# Patient Record
Sex: MALE | Race: WHITE | HISPANIC OR LATINO | Employment: FULL TIME | URBAN - METROPOLITAN AREA
[De-identification: names, ages, dates, MRNs, and addresses within clinical notes are randomized per-mention and may not be internally consistent; named-entity substitution may affect disease eponyms.]

---

## 2017-01-12 ENCOUNTER — ALLSCRIPTS OFFICE VISIT (OUTPATIENT)
Dept: OTHER | Facility: OTHER | Age: 72
End: 2017-01-12

## 2017-02-21 ENCOUNTER — ANESTHESIA EVENT (OUTPATIENT)
Dept: GASTROENTEROLOGY | Facility: AMBULARY SURGERY CENTER | Age: 72
End: 2017-02-21
Payer: COMMERCIAL

## 2017-02-21 RX ORDER — VITAMIN B COMPLEX
TABLET ORAL EVERY MORNING
COMMUNITY
End: 2018-02-16

## 2017-02-21 RX ORDER — PANTOPRAZOLE SODIUM 40 MG/1
40 TABLET, DELAYED RELEASE ORAL EVERY MORNING
COMMUNITY
End: 2018-03-30

## 2017-02-21 RX ORDER — LACTOBACILLUS ACIDOPHILUS 2B CELL
TABLET ORAL 2 TIMES DAILY
COMMUNITY
End: 2018-03-30

## 2017-02-21 RX ORDER — ASCORBIC ACID 1000 MG
TABLET ORAL 2 TIMES DAILY
COMMUNITY
End: 2018-02-16

## 2017-02-22 ENCOUNTER — HOSPITAL ENCOUNTER (OUTPATIENT)
Facility: AMBULARY SURGERY CENTER | Age: 72
Setting detail: OUTPATIENT SURGERY
Discharge: HOME | End: 2017-02-22
Attending: INTERNAL MEDICINE | Admitting: INTERNAL MEDICINE
Payer: COMMERCIAL

## 2017-02-22 ENCOUNTER — GENERIC CONVERSION - ENCOUNTER (OUTPATIENT)
Dept: OTHER | Facility: OTHER | Age: 72
End: 2017-02-22

## 2017-02-22 ENCOUNTER — ANESTHESIA (OUTPATIENT)
Dept: GASTROENTEROLOGY | Facility: AMBULARY SURGERY CENTER | Age: 72
End: 2017-02-22
Payer: COMMERCIAL

## 2017-02-22 VITALS
DIASTOLIC BLOOD PRESSURE: 60 MMHG | RESPIRATION RATE: 18 BRPM | HEART RATE: 76 BPM | WEIGHT: 265 LBS | BODY MASS INDEX: 37.94 KG/M2 | TEMPERATURE: 98.3 F | HEIGHT: 70 IN | SYSTOLIC BLOOD PRESSURE: 127 MMHG | OXYGEN SATURATION: 93 %

## 2017-02-22 DIAGNOSIS — Z12.11 ENCOUNTER FOR SCREENING FOR MALIGNANT NEOPLASM OF COLON: ICD-10-CM

## 2017-02-22 DIAGNOSIS — R10.13 EPIGASTRIC PAIN: ICD-10-CM

## 2017-02-22 PROCEDURE — 88305 TISSUE EXAM BY PATHOLOGIST: CPT | Performed by: INTERNAL MEDICINE

## 2017-02-22 PROCEDURE — 88342 IMHCHEM/IMCYTCHM 1ST ANTB: CPT | Performed by: INTERNAL MEDICINE

## 2017-02-22 RX ORDER — SODIUM CHLORIDE, SODIUM LACTATE, POTASSIUM CHLORIDE, CALCIUM CHLORIDE 600; 310; 30; 20 MG/100ML; MG/100ML; MG/100ML; MG/100ML
50 INJECTION, SOLUTION INTRAVENOUS CONTINUOUS
Status: DISCONTINUED | OUTPATIENT
Start: 2017-02-22 | End: 2017-02-22 | Stop reason: HOSPADM

## 2017-02-22 RX ORDER — PROPOFOL 10 MG/ML
INJECTION, EMULSION INTRAVENOUS AS NEEDED
Status: DISCONTINUED | OUTPATIENT
Start: 2017-02-22 | End: 2017-02-22 | Stop reason: SURG

## 2017-02-22 RX ADMIN — LIDOCAINE HYDROCHLORIDE 50 MG: 20 INJECTION, SOLUTION INTRAVENOUS at 09:55

## 2017-02-22 RX ADMIN — PROPOFOL 150 MG: 10 INJECTION, EMULSION INTRAVENOUS at 09:55

## 2017-02-22 RX ADMIN — PROPOFOL 50 MG: 10 INJECTION, EMULSION INTRAVENOUS at 10:05

## 2017-02-22 RX ADMIN — PROPOFOL 100 MG: 10 INJECTION, EMULSION INTRAVENOUS at 10:00

## 2017-02-22 RX ADMIN — SODIUM CHLORIDE, SODIUM LACTATE, POTASSIUM CHLORIDE, AND CALCIUM CHLORIDE 50 ML/HR: .6; .31; .03; .02 INJECTION, SOLUTION INTRAVENOUS at 09:43

## 2017-03-02 ENCOUNTER — GENERIC CONVERSION - ENCOUNTER (OUTPATIENT)
Dept: OTHER | Facility: OTHER | Age: 72
End: 2017-03-02

## 2017-04-12 ENCOUNTER — ALLSCRIPTS OFFICE VISIT (OUTPATIENT)
Dept: OTHER | Facility: OTHER | Age: 72
End: 2017-04-12

## 2017-04-12 DIAGNOSIS — M25.561 PAIN IN RIGHT KNEE: ICD-10-CM

## 2017-04-12 DIAGNOSIS — M23.42 LOOSE BODY OF LEFT KNEE: ICD-10-CM

## 2017-04-17 ENCOUNTER — TRANSCRIBE ORDERS (OUTPATIENT)
Dept: ADMINISTRATIVE | Facility: HOSPITAL | Age: 72
End: 2017-04-17

## 2017-04-17 ENCOUNTER — APPOINTMENT (OUTPATIENT)
Dept: LAB | Facility: HOSPITAL | Age: 72
End: 2017-04-17
Payer: COMMERCIAL

## 2017-04-17 DIAGNOSIS — N13.8 ENLARGED PROSTATE WITH URINARY OBSTRUCTION: Primary | ICD-10-CM

## 2017-04-17 DIAGNOSIS — N40.1 ENLARGED PROSTATE WITH URINARY OBSTRUCTION: Primary | ICD-10-CM

## 2017-04-17 DIAGNOSIS — I10 ESSENTIAL HYPERTENSION, MALIGNANT: ICD-10-CM

## 2017-04-17 LAB — VENIPUNCTURE: NORMAL

## 2017-04-17 PROCEDURE — 36415 COLL VENOUS BLD VENIPUNCTURE: CPT | Performed by: UROLOGY

## 2017-04-26 ENCOUNTER — HOSPITAL ENCOUNTER (OUTPATIENT)
Dept: RADIOLOGY | Facility: HOSPITAL | Age: 72
Discharge: HOME | End: 2017-04-26
Attending: FAMILY MEDICINE
Payer: COMMERCIAL

## 2017-04-26 DIAGNOSIS — M25.561 PAIN IN RIGHT KNEE: ICD-10-CM

## 2017-04-26 DIAGNOSIS — M23.42 LOOSE BODY OF LEFT KNEE: ICD-10-CM

## 2017-04-26 PROCEDURE — 73721 MRI JNT OF LWR EXTRE W/O DYE: CPT

## 2017-04-28 ENCOUNTER — GENERIC CONVERSION - ENCOUNTER (OUTPATIENT)
Dept: OTHER | Facility: OTHER | Age: 72
End: 2017-04-28

## 2017-04-28 ENCOUNTER — ALLSCRIPTS OFFICE VISIT (OUTPATIENT)
Dept: OTHER | Facility: OTHER | Age: 72
End: 2017-04-28

## 2017-04-28 ENCOUNTER — HOSPITAL ENCOUNTER (OUTPATIENT)
Dept: RADIOLOGY | Facility: CLINIC | Age: 72
Discharge: HOME | End: 2017-04-28
Payer: COMMERCIAL

## 2017-04-28 DIAGNOSIS — M25.561 PAIN IN RIGHT KNEE: ICD-10-CM

## 2017-04-28 PROCEDURE — 73562 X-RAY EXAM OF KNEE 3: CPT

## 2017-05-13 ENCOUNTER — TRANSCRIBE ORDERS (OUTPATIENT)
Dept: ADMINISTRATIVE | Facility: HOSPITAL | Age: 72
End: 2017-05-13

## 2017-05-20 ENCOUNTER — APPOINTMENT (OUTPATIENT)
Dept: LAB | Facility: HOSPITAL | Age: 72
End: 2017-05-20
Payer: COMMERCIAL

## 2017-05-20 ENCOUNTER — TRANSCRIBE ORDERS (OUTPATIENT)
Dept: ADMINISTRATIVE | Facility: HOSPITAL | Age: 72
End: 2017-05-20

## 2017-05-20 DIAGNOSIS — R06.02 SHORTNESS OF BREATH: Primary | ICD-10-CM

## 2017-05-20 DIAGNOSIS — R06.02 SHORTNESS OF BREATH: ICD-10-CM

## 2017-05-20 LAB — VENIPUNCTURE: NORMAL

## 2017-05-20 PROCEDURE — 36415 COLL VENOUS BLD VENIPUNCTURE: CPT

## 2017-07-31 ENCOUNTER — ALLSCRIPTS OFFICE VISIT (OUTPATIENT)
Dept: OTHER | Facility: OTHER | Age: 72
End: 2017-07-31

## 2017-08-16 ENCOUNTER — ALLSCRIPTS OFFICE VISIT (OUTPATIENT)
Dept: OTHER | Facility: OTHER | Age: 72
End: 2017-08-16

## 2018-01-03 ENCOUNTER — ALLSCRIPTS OFFICE VISIT (OUTPATIENT)
Dept: OTHER | Facility: OTHER | Age: 73
End: 2018-01-03

## 2018-01-03 DIAGNOSIS — R10.12 LEFT UPPER QUADRANT PAIN: ICD-10-CM

## 2018-01-05 NOTE — PROGRESS NOTES
Assessment   1  Abdominal pain, left upper quadrant (789 02) (R10 12)   2  Ventral hernia (553 20) (K43 9)   3  BMI 39 0-39 9,adult (V85 39) (Z68 39)    Plan   Abdominal pain, left upper quadrant    · * US ABDOMEN COMPLETE; Status:Active; Requested OWI:06HQH8231; Discussion/Summary      Pt had 1 episode of acute abdominal that resolved spontaneously after a few hours  He has had no further episodes of abdominal pain  The sensation he is describing today is most likely r/t ventral hernia   abdomen ordered to r/o abdominal pathology  Follow up for CPE next week as previously scheduled  Possible side effects of new medications were reviewed with the patient/guardian today  The treatment plan was reviewed with the patient/guardian  The patient/guardian understands and agrees with the treatment plan      Chief Complaint   Left sided pain      History of Present Illness   HPI: 2 weeks ago he developed sudden onset left sided abdominal pain that awoke him from his sleep  Denies n/v/d or fevers  The pain lasted for a few hours  He did have a large amount of diarrhea, unsure if this was before or after the pain  Pain resolved without intervention  He has had no pain since then  now he has a bumping sensation in that area, but no pain  BM this morning  Denies blood in his stool   n/v, changes in bowel habits, blood or mucous in stool fevers  to date with colonoscopy hernia per GI      Review of Systems        Constitutional: no fever or chills, feels well, no tiredness, no recent weight loss or weight gain  Cardiovascular: no chest pain  Respiratory: no shortness of breath,-- no cough-- and-- no wheezing  Gastrointestinal: abdominal pain, but-- as noted in HPI  Active Problems   1  Acute meniscal injury of right knee, initial encounter (959 7) (S83 8X1A)   2  Arthralgia of right knee (719 46) (M25 561)   3  Benign prostatic hypertrophy without urinary obstruction (600 00) (N40 0)   4   Bilateral edema of lower extremity (782 3) (R60 0)   5  BMI 39 0-39 9,adult (V85 39) (Z68 39)   6  Body, loose, knee, left (717 6) (M23 42)   7  CAD (coronary artery disease) (414 00) (I25 10)   8  Colon cancer screening (V76 51) (Z12 11)   9  Dyspepsia (536 8) (R10 13)   10  Glucosuria (791 5) (R81)   11  History of colon polyps (V12 72) (Z86 010)   12  Immunization due (V05 9) (Z23)   13  Lumbago (724 2) (M54 5)   14  Morbid obesity with alveolar hypoventilation (278 03) (E66 2)   15  Obstructive sleep apnea (327 23) (G47 33)   16  Old peripheral tear of medial meniscus of right knee (717 3) (M23 203)   17  Osteoarthrosis (715 90) (M19 90)   18  Prediabetes (790 29) (R73 09)   19  Primary localized osteoarthritis of right knee (715 16) (M17 11)   20  Prostate cancer screening (V76 44) (Z12 5)   21  Restrictive lung disease (518 89) (J98 4)   22  Right knee pain (719 46) (M25 561)   23  Screening for cardiovascular condition (V81 2) (Z13 6)   24  Screening for diabetes mellitus (DM) (V77 1) (Z13 1)   25  Screening for genitourinary condition (V81 6) (Z13 89)   26  Screening for lipid disorders (V77 91) (Z13 220)   27  Shortness of breath (786 05) (R06 02)   28  Thyroid disorder screening (V77 0) (Z13 29)   29  Tinea cruris (110 3) (B35 6)   30  Umbilical hernia (355 4) (K42 9)    Past Medical History   1  _ (844)   2  History of Actinic keratosis (702 0) (L57 0)   3  History of Acute bacterial prostatitis (601 0) (N41 0)   4  History of Acute bacterial prostatitis (601 0) (N41 0)   5  History of Acute maxillary sinusitis (461 0) (J01 00)   6  History of Ankle pain, unspecified laterality   7  History of Benign Prostatic Hyperplasia (600 00)   8  History of Elbow pain, unspecified laterality (719 42) (M25 529)   9  History of Elevated BP (401 9) (I10)   10  History of External Hemorrhoids (455 3)   11  History of acute bronchitis (V12 69) (Z87 09)   12  History of acute prostatitis (V13 89) (Z87 438)   13   History of athlete's foot (V12 09) (Z86 19)   14  History of backache (V13 59) (Z87 39)   15  History of chest pain (V13 89) (Z87 898)   16  History of edema (V13 89) (Z87 898)   17  History of epistaxis (V12 69) (Z87 898)   18  History of headache (V13 89) (Z87 898)   19  History of hyperlipidemia (V12 29) (Z86 39)   20  History of impetigo (V13 3) (Z87 2)   21  History of scabies (V12 09) (Z86 19)   22  History of sciatica (V12 49) (Z86 69)   23  History of tinea corporis (V12 09) (Z86 19)   24  History of Impacted cerumen, unspecified laterality (380 4) (H61 20)   25  History of Knee pain, right anterior (719 46) (M25 561)   26  History of Knee swelling, left (719 06) (M25 462)   27  History of Morbid obesity with alveolar hypoventilation (278 03) (E66 2)   28  History of Multiple joint pain (719 49) (M25 50)   29  History of Olecranon bursitis (726 33) (M70 20)   30  History of Palpitations (785 1) (R00 2)   31  History of Paronychia of toe, unspecified laterality (681 11) (L03 039)   32  History of Peripheral vertigo (386 10) (H81 399)   33  History of Plantar fascial fibromatosis (728 71) (M72 2)   34  History of Pruritus (698 9) (L29 9)   35  History of Right knee pain (719 46) (M25 561)   36  History of Rotator Cuff Sprain (Capsule) (840 4)   37  History of Shoulder joint pain, unspecified laterality  Active Problems And Past Medical History Reviewed: The active problems and past medical history were reviewed and updated today  Family History   Mother    1  Denied: Family history of Colon cancer   2  Denied: Family history of Crohn's disease without complication, unspecified     gastrointestinal tract location   3  Family history of arthritis (V17 7) (Z82 61)   4  Family history of diabetes mellitus (V18 0) (Z83 3)   5  Family history of hypertension (V17 49) (Z82 49)   6  Denied: Family history of liver disease  Father    9  Denied: Family history of Colon cancer   8   Denied: Family history of Crohn's disease without complication, unspecified     gastrointestinal tract location   9  Denied: Family history of liver disease    Social History    · Never A Smoker   · No alcohol use  The social history was reviewed and is unchanged  Surgical History   1  History of Complete Colonoscopy    Current Meds    1  CoQ-10 100 MG Oral Capsule Extended Release; Once a day; Therapy: 29Hyz7027 to Recorded   2  Daily Multivitamin Oral Capsule; take 1 capsule daily; Therapy: (Recorded:58Mfr5837) to Recorded   3  Ginkoba TABS; Take 1 tablet daily; Therapy: (Recorded:69Czx3563) to Recorded   4  Meloxicam 15 MG Oral Tablet; 1 Every Day, As Needed; Therapy: 12JSI9103 to (Last Rx:27Ayy6255)  Requested for: 73Xcq3910 Ordered   5  Probiotic CAPS Recorded   6  Selenium Sulfide 2 5 % External Lotion; MASSAGE IN AND LEAVE ON FOR 10 MINS     THEN RINSE USE DAILY FOR 7 DAYS; Therapy: 99COB7947 to (Evaluate:25Mar2018)  Requested for: 75WPU8490; Last     Rx:18Msp4221 Ordered     The medication list was reviewed and updated today  Allergies   1  Sulfa Drugs    Vitals    Recorded: 55UVH3500 03:55PM Recorded: 77HUP9831 03:20PM   Temperature  98 8 F   Heart Rate  72   Respiration  18   Systolic 140 664   Diastolic 96 94   Height  5 ft 9 in   Weight  270 lb    BMI Calculated  39 87   BSA Calculated  2 35     Physical Exam        Constitutional      General appearance: Abnormal   morbidly obese  Eyes      Conjunctiva and lids: No swelling, erythema, or discharge  Ears, Nose, Mouth, and Throat      Otoscopic examination: Tympanic membrance translucent with normal light reflex  Canals patent without erythema  Nasal mucosa, septum, and turbinates: Normal without edema or erythema  Oropharynx: Normal with no erythema, edema, exudate or lesions  Pulmonary      Respiratory effort: No increased work of breathing or signs of respiratory distress         Auscultation of lungs: Clear to auscultation, equal breath sounds bilaterally, no wheezes, no rales, no rhonci  Cardiovascular      Auscultation of heart: Normal rate and rhythm, normal S1 and S2, without murmurs  Examination of extremities for edema and/or varicosities: Normal        Abdomen      Abdomen: Abnormal   The abdomen was rounded-- and-- obese  Bowel sounds were normal  The abdomen was soft and nontender  -- reducible ventral hernia and laxity in left abdominal muscles  Liver and spleen: No hepatomegaly or splenomegaly  Skin      Skin and subcutaneous tissue: Normal without rashes or lesions  Psychiatric      Mood and affect: Normal           Attending Note   Collaborating Physician Note: Collaborating Note: I agree with the Advanced Practitioner note        Future Appointments      Date/Time Provider Specialty Site   01/09/2018 02:30 PM Santos Lara, [address] [place] [address]     Signatures    Electronically signed by : Ruben Nguyen; Damien  3 2018  8:20PM EST                       (Author)     Electronically signed by : Alfredo Benoit DO; Jan 4 2018  9:07AM EST                       (Review)

## 2018-01-09 ENCOUNTER — ALLSCRIPTS OFFICE VISIT (OUTPATIENT)
Dept: OTHER | Facility: OTHER | Age: 73
End: 2018-01-09

## 2018-01-10 ENCOUNTER — GENERIC CONVERSION - ENCOUNTER (OUTPATIENT)
Dept: OTHER | Facility: OTHER | Age: 73
End: 2018-01-10

## 2018-01-10 ENCOUNTER — ALLSCRIPTS OFFICE VISIT (OUTPATIENT)
Dept: OTHER | Facility: OTHER | Age: 73
End: 2018-01-10

## 2018-01-11 NOTE — RESULT NOTES
Discussion/Summary   MRI of the right knee showed a torn meniscus  I am aware that you have discussed this already with the orthopedic surgeon Dr Leila Sherwood 23Azm6863 03:07PM Barb Montemayor Order Number: [id number]    - Patient Instructions: To schedule this appointment, please contact Central Scheduling at [phone]  Test Name Result Flag Reference   MRI KNEE RIGHT  WO CONTRAST (Report)     This is a summary report  The complete report is available in the patient's medical record  If you cannot access the medical record, please contact the sending organization for a detailed fax or copy  MRI RIGHT KNEE     INDICATION: M23 42: Loose body in knee, left knee   M25 561: Pain in right knee  History taken directly from the electronic ordering system  COMPARISON: Radiographic series 4/21/2016  TECHNIQUE:  MR sequences were obtained of the right knee including: Localizer, axial T2 fat sat, coronal T1/T2 fat sat, sagittal PD/T2 fat sat  Images were acquired on a 1 5 Cristina unit  Gadolinium was not used  FINDINGS:     SUBCUTANEOUS TISSUES: Mild subcutaneous edema along the patella tendon  JOINT EFFUSION: There is a small joint effusion  BAKER'S CYST: None  MENISCI: Vertical tear within the posterior horn the medial meniscus with extrusion of the disc material medially  There is attenuation of the body and the lateral meniscus is normal      CRUCIATE LIGAMENTS: Intact  EXTENSOR APPARATUS: Intact  COLLATERAL LIGAMENTS: Intact  ARTICULAR SURFACES: Minimal narrowing of the medial joint compartment without significant osteophytosis or cartilage thinning  Mild chondromalacia along the medial facet of patella with subchondral geode in the mid patella at the patellofemoral joint  BONE MARROW: No occult fracture  MUSCULATURE: Intact  IMPRESSION:       1   Vertical tear of the posterior horn medial meniscus with medial extrusion of the disc material    2  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

## 2018-01-12 VITALS
SYSTOLIC BLOOD PRESSURE: 112 MMHG | DIASTOLIC BLOOD PRESSURE: 78 MMHG | HEIGHT: 69 IN | TEMPERATURE: 98.3 F | BODY MASS INDEX: 39.4 KG/M2 | RESPIRATION RATE: 20 BRPM | HEART RATE: 80 BPM | WEIGHT: 266 LBS

## 2018-01-12 VITALS
SYSTOLIC BLOOD PRESSURE: 154 MMHG | HEART RATE: 94 BPM | DIASTOLIC BLOOD PRESSURE: 82 MMHG | BODY MASS INDEX: 39.6 KG/M2 | WEIGHT: 267.38 LBS | HEIGHT: 69 IN

## 2018-01-12 NOTE — PROGRESS NOTES
Assessment    1  BMI 40 0-44 9, adult (V85 41) (Z68 41)   2  Morbid obesity with alveolar hypoventilation (278 03) (E66 2)   3  Obstructive sleep apnea (327 23) (G47 33)   4  CAD (coronary artery disease) (414 00) (I25 10)   5  Prediabetes (790 29) (R73 09)   6  Acute prostatitis (601 0) (N41 0)   7  Bilateral edema of lower extremity (782 3) (R60 0)   8  Encounter for preventive health examination (V70 0) (Z00 00)    Plan  Colon cancer screening    · DIGITAL RECTAL EXAM; Status:Complete;   Done: 55QRV2805 12:00AM  Prediabetes    · (1) COMPREHENSIVE METABOLIC PANEL; Status:Active; Requested for:58Yjk8353;    · (1) HEMOGLOBIN A1C; Status:Active; Requested for:68Wwk3438;     Discussion/Summary  Impression: health maintenance visit, healthy adult male  Currently, he eats a poor diet and has an inadequate exercise regimen  Prostate cancer screening: the risks and benefits of prostate cancer screening were discussed  Testicular cancer screening: the risks and benefits of testicular cancer screening were discussed  Screening lab work includes glucose  Advice and education were given regarding aerobic exercise, weight bearing exercise and weight loss  Patient discussion: discussed with the patient  Swelling in legs may improve with weight loss, exercise, elevation and compression stockings  Chief Complaint  pt present for CPE  ac/cma      History of Present Illness  HM, Adult Male: The patient is being seen for a health maintenance evaluation  General Health: The patient's health since the last visit is described as good  Lifestyle:  He does not have a healthy diet  He has weight concerns  He does not exercise regularly  Screening:   HPI: still has urinary burning  on abx  here for cpe  prediabetes advised, probable glucose intolerance  leg edema about same, better in am, worse end of day  labs reviewed, psa normal, he hasn't contacted his urologist yet for prostatitis f/u      Active Problems    1   Acute meniscal injury of right knee, initial encounter (959 7) (S89 81XA)   2  Acute prostatitis (601 0) (N41 0)   3  Benign prostatic hypertrophy without urinary obstruction (600 00) (N40 0)   4  CAD (coronary artery disease) (414 00) (I25 10)   5  Colon cancer screening (V76 51) (Z12 11)   6  Elevated BP (401 9) (I10)   7  Glucosuria (791 5) (R81)   8  Immunization due (V05 9) (Z23)   9  Lumbago (724 2) (M54 5)   10  Morbid obesity with alveolar hypoventilation (278 03) (E66 2)   11  Obstructive sleep apnea (327 23) (G47 33)   12  Osteoarthrosis (715 90) (M19 90)   13  Prediabetes (790 29) (R73 09)   14  Prostate cancer screening (V76 44) (Z12 5)   15  Right knee pain (719 46) (M25 561)   16  Screening for cardiovascular condition (V81 2) (Z13 6)   17  Screening for diabetes mellitus (DM) (V77 1) (Z13 1)   18  Screening for genitourinary condition (V81 6) (Z13 89)   19  Screening for lipid disorders (V77 91) (Z13 220)   20  Thyroid disorder screening (V77 0) (Z13 29)   21  Tinea cruris (110 3) (B35 6)   22   Umbilical hernia (519 9) (K42 9)    Past Medical History    · _ (844)   · History of Actinic keratosis (702 0) (L57 0)   · History of Acute bacterial prostatitis (601 0) (N41 0)   · History of Acute bacterial prostatitis (601 0) (N41 0)   · History of Acute maxillary sinusitis (461 0) (J01 00)   · History of Ankle pain, unspecified laterality   · History of Benign Prostatic Hyperplasia (600 00)   · History of Elbow pain, unspecified laterality (719 42) (M25 529)   · History of External Hemorrhoids (455 3)   · History of acute bronchitis (V12 69) (Z87 09)   · History of athlete's foot (V12 09) (Z86 19)   · History of backache (V13 59) (Z87 39)   · History of chest pain (V13 89) (T86 814)   · History of edema (V13 89) (X86 960)   · History of epistaxis (V12 69) (O40 333)   · History of headache (V13 89) (L82 684)   · History of hyperlipidemia (V12 29) (Z86 39)   · History of impetigo (V13 3) (Z87 2)   · History of scabies (V12 09) (Z86 19)   · History of sciatica (V12 49) (Z86 69)   · History of tinea corporis (V12 09) (Z86 19)   · History of Impacted cerumen, unspecified laterality (380 4) (H61 20)   · History of Knee pain, right anterior (719 46) (M25 561)   · History of Knee swelling, left (719 06) (M25 462)   · History of Multiple joint pain (719 49) (M25 50)   · History of Olecranon bursitis (726 33) (M70 20)   · History of Palpitations (785 1) (R00 2)   · History of Paronychia of toe, unspecified laterality (681 11) (L03 039)   · History of Peripheral vertigo (386 10) (H81 399)   · History of Plantar fascial fibromatosis (728 71) (M72 2)   · History of Pruritus (698 9) (L29 9)   · History of Rotator Cuff Sprain (Capsule) (840 4)   · History of Shoulder joint pain, unspecified laterality    Family History  Mother    · Family history of arthritis (V17 7) (Z82 61)   · Family history of diabetes mellitus (V18 0) (Z83 3)   · Family history of hypertension (V17 49) (Z82 49)    Social History    · Never A Smoker    Current Meds   1  Ciprofloxacin HCl - 500 MG Oral Tablet; TAKE 1 TABLET TWICE DAILY for 28 days; Therapy: 67IZM2640 to (Evaluate:12Tkh9163)  Requested for: 06FAL3886; Last   Rx:28Brs6148 Ordered   2  Ginkoba TABS; Take as directed; Therapy: (Recorded:48Lzy7978) to Recorded   3  Omega-3 1000 MG Oral Capsule; 1 every day; Therapy: 96Xmo6245 to  Requested for: 28Bpl5597 Recorded    Allergies    1  Sulfa Drugs    Vitals   Recorded: 99LDE6896 03:22PM Recorded: 15WQR4138 02:58PM   Temperature  97 8 F   Heart Rate  76   Respiration  16   Systolic 602 876   Diastolic 84 98   Height  5 ft 9 in   Weight  276 lb    BMI Calculated  40 76   BSA Calculated  2 37     Physical Exam    Constitutional   General appearance: No acute distress, well appearing and well nourished  Eyes   Conjunctiva and lids: No erythema, swelling or discharge  Pupils and irises: Equal, round, reactive to light      Ears, Nose, Mouth, and Throat   External inspection of ears and nose: Normal     Otoscopic examination: Tympanic membranes translucent with normal light reflex  Canals patent without erythema  Nasal mucosa, septum, and turbinates: Normal without edema or erythema  Lips, teeth, and gums: Normal, good dentition  Oropharynx: Normal with no erythema, edema, exudate or lesions  Neck   Neck: Supple, symmetric, trachea midline, no masses  Thyroid: Normal, no thyromegaly  Pulmonary   Respiratory effort: No increased work of breathing or signs of respiratory distress  Auscultation of lungs: Clear to auscultation  Cardiovascular   Palpation of heart: Normal PMI, no thrills  Auscultation of heart: Normal rate and rhythm, normal S1 and S2, no murmurs  Pedal pulses: 2+ bilaterally  Examination of extremities for edema and/or varicosities: Normal     Abdomen   Abdomen: Abnormal   The abdomen was rounded and obese  Liver and spleen: No hepatomegaly or splenomegaly  Examination for hernias: No hernias appreciated  Anus, perineum, and rectum: Normal sphincter tone, no masses, no prolapse  Lymphatic   Palpation of lymph nodes in neck: No lymphadenopathy  Palpation of lymph nodes in axillae: No lymphadenopathy  Palpation of lymph nodes in groin: No lymphadenopathy  Palpation of lymph nodes in other areas: No lymphadenopathy  Musculoskeletal   Gait and station: Normal     Inspection/palpation of digits and nails: Normal without clubbing or cyanosis  Inspection/palpation of joints, bones, and muscles: Normal     Range of motion: Normal     Stability: Normal     Muscle strength/tone: Normal     Skin   Skin and subcutaneous tissue: Normal without rashes or lesions  Palpation of skin and subcutaneous tissue: Normal turgor  Neurologic   Reflexes: 2+ and symmetric  Sensation: No sensory loss      Psychiatric   Judgment and insight: Normal     Mood and affect: Normal        Procedure    Procedure: Visual Acuity Test    Indication: routine screening  Inforrmation supplied by a Snellen chart     Results: 20/20 in both eyes without corrective device, 20/25 in the right eye without corrective device, 20/25 in the left eye without corrective device      Signatures   Electronically signed by : Xiao Tuttle DO; Jul 8 2016  9:51PM EST                       (Author)

## 2018-01-13 ENCOUNTER — HOSPITAL ENCOUNTER (OUTPATIENT)
Dept: RADIOLOGY | Facility: HOSPITAL | Age: 73
Discharge: HOME | End: 2018-01-13
Payer: COMMERCIAL

## 2018-01-13 VITALS
WEIGHT: 270 LBS | SYSTOLIC BLOOD PRESSURE: 120 MMHG | HEIGHT: 69 IN | BODY MASS INDEX: 39.99 KG/M2 | DIASTOLIC BLOOD PRESSURE: 90 MMHG | RESPIRATION RATE: 18 BRPM | HEART RATE: 76 BPM | TEMPERATURE: 98.2 F

## 2018-01-13 DIAGNOSIS — R10.12 LEFT UPPER QUADRANT PAIN: ICD-10-CM

## 2018-01-13 PROCEDURE — 76700 US EXAM ABDOM COMPLETE: CPT

## 2018-01-13 NOTE — RESULT NOTES
Message    Colon polyp removed came back as precancerous serrated adenoma  Repeat colonoscopy in 3 years    Spoke with pt, discussed results  Reminder set  CS         Verified Results  (1) TISSUE EXAM 60Jsg3073 10:00AM Michaelle Pradhan     Test Name Result Flag Reference   LAB AP CASE REPORT (Report)     Surgical Pathology Report             Case: [id number]                   Authorizing Provider: Micahel Graham MD     Collected:      02/22/2017 1000        Ordering Location:   [facility]  Received:      02/22/2017 [address]                                     Pathologist:      Lele Song MD                               Specimens:  A) - Stomach, Gastric body biopsy cold forcep rule out H  pylori                    B) - Large Intestine, Right/Ascending Colon, Hot snare polyp ascending colon   LAB AP FINAL DIAGNOSIS (Report)     A  Stomach, body, biopsy:   - Mild chronic inactive antral gastritis   - H pylori organisms are not identified on immunostain   - No intestinal metaplasia, dysplasia, or malignancy identified    B  Colon, ascending, biopsy:   - Sessile serrated adenoma   - No high grade dysplasia or malignancy identified    Electronically signed by Lele Song MD on 2/24/2017 at 9:19 AM   LAB AP NOTE      Immunohistochemical stains are performed with adequate controls  LAB AP SURGICAL ADDITIONAL INFORMATION (Report)     These tests were developed and their performance characteristics   determined by [facility] or [facility]  They may not be cleared or approved by the [place]  Food and   Drug Administration  The FDA has determined that such clearance or   approval is not necessary  These tests are used for clinical purposes  They should not be regarded as investigational or for research   This   laboratory has been approved by CLIA 88, designated as a high-complexity   laboratory and is qualified to perform these tests     Interpretation performed at [facility]   LAB AP GROSS DESCRIPTION (Report)     A  The specimen is received in formalin, labeled with the patient's name   and hospital number, and is designated gastric body biopsy rule out H    pylori  The specimen consists of 2 tan soft tissue fragments measuring   0 3 and 0 4 cm  Entirely submitted  One cassette  Note: The estimated total formalin fixation time based upon information   provided by the submitting clinician and the standard processing schedule   is 18 5 hours  B  The specimen is received in formalin, labeled with the patient's name   and hospital number, and is designated polyp ascending colon  The   specimen consists of a single tan red soft tissue/polypoid fragments   measuring 0 5 cm  Entirely submitted  One cassette  Note: The estimated total formalin fixation time based upon information   provided by the submitting clinician and the standard processing schedule   is 18 25 hours      MAC

## 2018-01-13 NOTE — RESULT NOTES
Verified Results  * XR KNEE 4+ VIEW RIGHT 21Apr2016 12:23PM Maris Elizondo Order Number: [id number]     Test Name Result Flag Reference   XR KNEE 4+ VW RIGHT (Report)     RIGHT KNEE     INDICATION: Right knee pain and swelling  COMPARISON: None     VIEWS: 4; 4 images     FINDINGS:     There is no acute fracture or dislocation  There is no joint effusion  There are mild degenerative changes within the medial compartment  There is an approximately 1 cm well-corticated density within the joint space, possibly representing a loose body  Fabella incidentally noted  No lytic or blastic lesions are seen  Soft tissues are unremarkable  IMPRESSION:     Mild degenerative changes within the medial compartment with possible loose body in the joint space  Fabella incidentally noted  Workstation performed: [id number]     Signed by:   Audrey Greenberg MD   4/21/16       Discussion/Summary   Caled pt to discuss results of his xray  looks like he does have arthritis but he may also have a foreign body in the knee that may be a bone fragment  I would suspect this would be causing pain  I gave him meds and he should take them  Pt may want to see ortho for further eval however    I would suggest dr Arreola Said   advised pt to call back

## 2018-01-14 ENCOUNTER — LAB CONVERSION - ENCOUNTER (OUTPATIENT)
Dept: OTHER | Facility: OTHER | Age: 73
End: 2018-01-14

## 2018-01-14 VITALS
SYSTOLIC BLOOD PRESSURE: 150 MMHG | DIASTOLIC BLOOD PRESSURE: 85 MMHG | WEIGHT: 265 LBS | HEART RATE: 86 BPM | HEIGHT: 69 IN | BODY MASS INDEX: 39.25 KG/M2

## 2018-01-14 LAB
A/G RATIO (HISTORICAL): 1.1 (CALC) (ref 1–2.5)
ALBUMIN SERPL BCP-MCNC: 4 G/DL (ref 3.6–5.1)
ALP SERPL-CCNC: 78 U/L (ref 40–115)
ALT SERPL W P-5'-P-CCNC: 16 U/L (ref 9–46)
AST SERPL W P-5'-P-CCNC: 14 U/L (ref 10–35)
BILIRUB SERPL-MCNC: 0.7 MG/DL (ref 0.2–1.2)
BUN SERPL-MCNC: 21 MG/DL (ref 7–25)
BUN/CREA RATIO (HISTORICAL): ABNORMAL (CALC) (ref 6–22)
CALCIUM SERPL-MCNC: 9.4 MG/DL (ref 8.6–10.3)
CHLORIDE SERPL-SCNC: 104 MMOL/L (ref 98–110)
CHOLEST SERPL-MCNC: 143 MG/DL
CHOLEST/HDLC SERPL: 3.9 (CALC)
CO2 SERPL-SCNC: 24 MMOL/L (ref 20–31)
CREAT SERPL-MCNC: 1.11 MG/DL (ref 0.7–1.18)
EGFR AFRICAN AMERICAN (HISTORICAL): 76 ML/MIN/1.73M2
EGFR-AMERICAN CALC (HISTORICAL): 66 ML/MIN/1.73M2
GAMMA GLOBULIN (HISTORICAL): 3.6 G/DL (CALC) (ref 1.9–3.7)
GLUCOSE (HISTORICAL): 114 MG/DL (ref 65–99)
HDLC SERPL-MCNC: 37 MG/DL
LDL CHOLESTEROL (HISTORICAL): 83 MG/DL (CALC)
NON-HDL-CHOL (CHOL-HDL) (HISTORICAL): 106 MG/DL (CALC)
POTASSIUM SERPL-SCNC: 4.3 MMOL/L (ref 3.5–5.3)
PROSTATE SPECIFIC ANTIGEN TOTAL (HISTORICAL): 0.8 NG/ML
SODIUM SERPL-SCNC: 140 MMOL/L (ref 135–146)
TOTAL PROTEIN (HISTORICAL): 7.6 G/DL (ref 6.1–8.1)
TRIGL SERPL-MCNC: 126 MG/DL

## 2018-01-15 ENCOUNTER — GENERIC CONVERSION - ENCOUNTER (OUTPATIENT)
Dept: OTHER | Facility: OTHER | Age: 73
End: 2018-01-15

## 2018-01-15 VITALS
TEMPERATURE: 98.6 F | WEIGHT: 282 LBS | HEART RATE: 77 BPM | OXYGEN SATURATION: 97 % | RESPIRATION RATE: 16 BRPM | BODY MASS INDEX: 41.77 KG/M2 | SYSTOLIC BLOOD PRESSURE: 142 MMHG | DIASTOLIC BLOOD PRESSURE: 90 MMHG | HEIGHT: 69 IN

## 2018-01-17 ENCOUNTER — GENERIC CONVERSION - ENCOUNTER (OUTPATIENT)
Dept: OTHER | Facility: OTHER | Age: 73
End: 2018-01-17

## 2018-01-17 NOTE — RESULT NOTES
Verified Results  * XR CHEST PA & LATERAL 48DQV4732 04:43PM Lilo Diane Order Number: [id number]     Test Name Result Flag Reference   XR CHEST PA & LATERAL (Report)     CHEST      INDICATION: Short of breath     COMPARISON: 03/02/2011     VIEWS: Frontal and lateral projections; 2 images     FINDINGS:        Cardiomediastinal silhouette appears unremarkable  The lungs are clear  No pneumothorax or pleural effusion  Visualized osseous structures appear within normal limits for the patient's age  IMPRESSION:     No active pulmonary disease         Workstation performed: [id number]     Signed by:   Andra Bess MD   12/22/16       Discussion/Summary   Your chest x-ray is normal    Dr Mireille Fernandez

## 2018-01-18 NOTE — RESULT NOTES
Verified Results  (1) COMPREHENSIVE METABOLIC PANEL [id number] 31:95WD [facility]     Test Name Result Flag Reference   GLUCOSE 94 mg/dL  65-99   Fasting reference interval   UREA NITROGEN (BUN) 14 mg/dL  7-25   CREATININE 0 98 mg/dL  0 70-1 18   For patients >52years of age, the reference limit  for Creatinine is approximately 13% higher for people  identified as -American  eGFR NON-AFR  AMERICAN 78 mL/min/1 73m2  > OR = 60   eGFR AFRICAN AMERICAN 90 mL/min/1 73m2  > OR = 60   BUN/CREATININE RATIO   5-60   NOT APPLICABLE (calc)   SODIUM 140 mmol/L  135-146   POTASSIUM 4 3 mmol/L  3 5-5 3   CHLORIDE 105 mmol/L     CARBON DIOXIDE 25 mmol/L  19-30   CALCIUM 9 1 mg/dL  8 6-10 3   PROTEIN, TOTAL 6 4 g/dL  6 1-8 1   ALBUMIN 3 8 g/dL  3 6-5 1   GLOBULIN 2 6 g/dL (calc)  1 9-3 7   ALBUMIN/GLOBULIN RATIO 1 5 (calc)  1 0-2 5   BILIRUBIN, TOTAL 0 9 mg/dL  0 2-1 2   ALKALINE PHOSPHATASE 63 U/L     AST 21 U/L  10-35   ALT 28 U/L  9-46     (Q) TESTOSTERONE,TOTAL,MALES [id number] 10:30AM [facility]     Test Name Result Flag Reference   TESTOSTERONE,TOTAL,MALES 274 ng/dL  697-694   Men with clinically significant hypogonadal symptoms  and testosterone values repeatedly less than   approximately 300 ng/dL may benefit from testosterone   treatment after adequate risk and benefits counseling  (Q) LIPID PANEL WITH REFLEX TO DIRECT LDL [id number] 10:30AM [facility]     Test Name Result Flag Reference   CHOLESTEROL, TOTAL 148 mg/dL  125-200   HDL CHOLESTEROL 40 mg/dL  > OR = 40   TRIGLICERIDES 717 mg/dL H <150   LDL-CHOLESTEROL 69 mg/dL (calc)  <130   Desirable range <100 mg/dL for patients with CHD or  diabetes and <70 mg/dL for diabetic patients with  known heart disease  CHOL/HDLC RATIO 3 7 (calc)  < OR = 5 0   NON HDL CHOLESTEROL 108 mg/dL (calc)     Target for non-HDL cholesterol is 30 mg/dL higher than   LDL cholesterol target       (Q) TSH, 3RD GENERATION [id number] 10:30AM Ricardo Hillman Otis Parks     Test Name Result Flag Reference   TSH 1 40 mIU/L  0 40-4 50     (Q) HEMOGLOBIN A1c [id number] 10:30AM Edna Truong     Test Name Result Flag Reference   HEMOGLOBIN A1c 6 3 % of total Hgb H <5 7   According to ADA guidelines, hemoglobin A1c <7 0%  represents optimal control in non-pregnant diabetic  patients  Different metrics may apply to specific  patient populations  Standards of Medical Care in    Diabetes Care  2013;36:s11-s66     For the purpose of screening for the presence of  diabetes  <5 7%       Consistent with the absence of diabetes  5 7-6 4%    Consistent with increased risk for diabetes              (prediabetes)  >or=6 5%    Consistent with diabetes     This assay result is consistent with a higher risk  of diabetes  Currently, no consensus exists for use of hemoglobin  A1c for diagnosis of diabetes for children  (1) PSA (SCREEN) (Dx V76 44 Screen for Prostate Cancer) [id number] 10:30AM Edna Truong     Test Name Result Flag Reference   PSA, TOTAL 0 7 ng/mL  < OR = 4 0   This test was performed using the Siemens  chemiluminescent method  Values obtained from  different assay methods cannot be used  interchangeably  PSA levels, regardless of  value, should not be interpreted as absolute  evidence of the presence or absence of disease  Discussion/Summary   Schedule an annual physical appointment    Dr Luz Caldwell

## 2018-01-23 VITALS
BODY MASS INDEX: 39.84 KG/M2 | RESPIRATION RATE: 20 BRPM | SYSTOLIC BLOOD PRESSURE: 140 MMHG | DIASTOLIC BLOOD PRESSURE: 84 MMHG | HEART RATE: 88 BPM | WEIGHT: 269 LBS | HEIGHT: 69 IN | TEMPERATURE: 99.7 F

## 2018-01-23 VITALS
DIASTOLIC BLOOD PRESSURE: 96 MMHG | HEIGHT: 69 IN | HEART RATE: 72 BPM | WEIGHT: 270 LBS | SYSTOLIC BLOOD PRESSURE: 142 MMHG | TEMPERATURE: 98.8 F | RESPIRATION RATE: 18 BRPM | BODY MASS INDEX: 39.99 KG/M2

## 2018-01-23 VITALS
HEIGHT: 69 IN | TEMPERATURE: 98.3 F | WEIGHT: 270.38 LBS | BODY MASS INDEX: 40.05 KG/M2 | DIASTOLIC BLOOD PRESSURE: 94 MMHG | HEART RATE: 86 BPM | SYSTOLIC BLOOD PRESSURE: 142 MMHG | RESPIRATION RATE: 18 BRPM

## 2018-01-23 NOTE — RESULT NOTES
Discussion/Summary   Cholesterol profile is elevated and so is your cardiac/stroke risk  Please follow up in office in one month  Sugar is in prediabetes range but kidneys, minerals and liver are normal          Prostate level is normal    Dr Ziyad Hansen        Verified Results  (1) COMPREHENSIVE METABOLIC PANEL [id number] 30:64FN Mal Core     Test Name Result Flag Reference   GLUCOSE 114 mg/dL H 65-99   Fasting reference interval     For someone without known diabetes, a glucose value  between 100 and 125 mg/dL is consistent with  prediabetes and should be confirmed with a  follow-up test    UREA NITROGEN (BUN) 21 mg/dL  7-25   CREATININE 1 11 mg/dL  0 70-1 18   For patients >52years of age, the reference limit  for Creatinine is approximately 13% higher for people  identified as -American  eGFR NON-AFR  AMERICAN 66 mL/min/1 73m2  > OR = 60   eGFR AFRICAN AMERICAN 76 mL/min/1 73m2  > OR = 60   BUN/CREATININE RATIO   0-15   NOT APPLICABLE (calc)   SODIUM 140 mmol/L  135-146   POTASSIUM 4 3 mmol/L  3 5-5 3   CHLORIDE 104 mmol/L     CARBON DIOXIDE 24 mmol/L  20-31   CALCIUM 9 4 mg/dL  8 6-10 3   PROTEIN, TOTAL 7 6 g/dL  6 1-8 1   ALBUMIN 4 0 g/dL  3 6-5 1   GLOBULIN 3 6 g/dL (calc)  1 9-3 7   ALBUMIN/GLOBULIN RATIO 1 1 (calc)  1 0-2 5   BILIRUBIN, TOTAL 0 7 mg/dL  0 2-1 2   ALKALINE PHOSPHATASE 78 U/L     AST 14 U/L  10-35   ALT 16 U/L  9-46     (Q) LIPID PANEL WITH REFLEX TO DIRECT LDL [id number] 11:20AM Mal Core     Test Name Result Flag Reference   CHOLESTEROL, TOTAL 143 mg/dL  <200   HDL CHOLESTEROL 37 mg/dL L >10   TRIGLICERIDES 698 mg/dL  <150   LDL-CHOLESTEROL 83 mg/dL (calc)     Reference range: <100     Desirable range <100 mg/dL for patients with CHD or  diabetes and <70 mg/dL for diabetic patients with  known heart disease       LDL-C is now calculated using the Ramakrishna-Strauss   calculation, which is a validated novel method providing   better accuracy than the Friedewald equation in the   estimation of LDL-C  Issac Huber  [place]  [facility]4980(10): [phone]   ([email or web address])   CHOL/HDLC RATIO 3 9 (calc)  <5 0   NON HDL CHOLESTEROL 106 mg/dL (calc)  <130   For patients with diabetes plus 1 major ASCVD risk   factor, treating to a non-HDL-C goal of <100 mg/dL   (LDL-C of <70 mg/dL) is considered a therapeutic   option  (1) PSA (SCREEN) (Dx V76 44 Screen for Prostate Cancer) [id number] 11:20AM Domenic Avilez   REPORT COMMENT:  FASTING:YES     Test Name Result Flag Reference   PSA, TOTAL 0 8 ng/mL  < OR = 4 0   The total PSA value from this assay system is   standardized against the WHO standard  The test   result will be approximately 20% lower when compared   to the equimolar-standardized total PSA (Dea   Madonna)  Comparison of serial PSA results should be   interpreted with this fact in mind  This test was performed using the Siemens   chemiluminescent method  Values obtained from   different assay methods cannot be used  interchangeably  PSA levels, regardless of  value, should not be interpreted as absolute  evidence of the presence or absence of disease

## 2018-01-23 NOTE — PROGRESS NOTES
Assessment    1  Benign essential HTN (401 1) (I10)   2  Encounter for preventive health examination (V70 0) (Z00 00)   3  Screening for cardiovascular, respiratory, and genitourinary diseases   (V81 2,V81 4,V81 6) (Z13 6,Z13 83,Z13 89)   4  Screening for diabetes mellitus (DM) (V77 1) (Z13 1)   5  Prostate cancer screening (V76 44) (Z12 5)   6  BMI 39 0-39 9,adult (V85 39) (Z68 39)   7  Morbid obesity with alveolar hypoventilation (278 03) (E66 2)   8  Osteoarthrosis (715 90) (M19 90)    Plan  Benign essential HTN    · Ramipril 5 MG Oral Capsule; take 1 capsule daily   · Begin a limited exercise program ; Status:Complete;   Done: 23WGI2417   · Restrict the salt in your diet by avoiding highly salted foods ; Status:Complete;   Done:  87XIW0181   · Shared Decision Making Aid given; Status:Complete;   Done: 56WDI1921   · Follow-up visit in 1 month Evaluation and Treatment  Follow-up  Status: Complete  Done:  90VLM2848  Bilateral edema of lower extremity, Health Maintenance, Prostate cancer screening,  Screening for cardiovascular, respiratory, and genitourinary diseases, Screening for  diabetes mellitus (DM)    · (1) COMPREHENSIVE METABOLIC PANEL; Status:Active; Requested GQP:11WGQ8179;    · (1) LIPID PANEL FASTING W DIRECT LDL REFLEX; Status:Active; Requested  ARYA:[id number];    · (1) PSA (SCREEN) (Dx V76 44 Screen for Prostate Cancer); Status:Active; Requested  ADAN:35VDW3664; Discussion/Summary    Please follow up in 1 month for your HYPERTENSION  Impression: Initial Annual Wellness Visit, with preventive exam as well as age and risk appropriate counseling completed  Cardiovascular screening and counseling: the risks and benefits of screening were discussed  Diabetes screening and counseling: the risks and benefits of screening were discussed  Colorectal cancer screening and counseling: the risks and benefits of screening were discussed     Prostate cancer screening and counseling: the risks and benefits of screening were discussed  Immunizations: the patient declines the influenza vaccination and the patient declines the pneumococcal vaccination  Advance Directive Planning: not complete  Patient Discussion: plan discussed with the patient  Chief Complaint  pt present for AWV af/rma      History of Present Illness  HPI: was in ER for WC head injury yesterday   Welcome to Medicare and Wellness Visits: The patient is being seen for the initial annual wellness visit  Medicare Screening and Risk Factors   Hospitalizations: no previous hospitalizations  Medicare Screening Tests Risk Questions   Abdominal aortic aneurysm risk assessment: none indicated  Osteoporosis risk assessment: none indicated  HIV risk assessment: none indicated  Drug and Alcohol Use: The patient has never smoked cigarettes  The patient reports occasional alcohol use  He has never used illicit drugs  Diet and Physical Activity: Current diet includes well balanced meals and low salt food choices  He exercises 3 times per week  Exercise: walking, strength training, cardio  Mood Disorder and Cognitive Impairment Screening: PHQ-9 Depression Scale   Over the past 2 weeks, how often have you been bothered by the following problems? 1 ) Little interest or pleasure in doing things? Not at all    2 ) Feeling down, depressed or hopeless? Not at all  Functional Ability/Level of Safety: He reports hearing difficulties  He does not use a hearing aid  Activities of daily living details: no transportation help needed, does not need help managing medications and does not need help managing money  Advance Directives: Advance directives: no living will and no durable power of  for health care directives  Co-Managers and Medical Equipment/Suppliers: See Patient Care Team      Patient Care Team    Care Team Member Role Specialty Office Number   Joseph Hollie DESAI   Specialist Gastroenterology Adult [phone]     Active Problems    1  Abdominal pain, left upper quadrant (789 02) (R10 12)   2  Benign prostatic hypertrophy without urinary obstruction (600 00) (N40 0)   3  Bilateral edema of lower extremity (782 3) (R60 0)   4  BMI 39 0-39 9,adult (V85 39) (Z68 39)   5  Body, loose, knee, left (717 6) (M23 42)   6  CAD (coronary artery disease) (414 00) (I25 10)   7  Colon cancer screening (V76 51) (Z12 11)   8  Dyspepsia (536 8) (R10 13)   9  History of colon polyps (V12 72) (Z86 010)   10  Immunization due (V05 9) (Z23)   11  Lumbago (724 2) (M54 5)   12  Morbid obesity with alveolar hypoventilation (278 03) (E66 2)   13  Obstructive sleep apnea (327 23) (G47 33)   14  Old peripheral tear of medial meniscus of right knee (717 3) (M23 203)   15  Osteoarthrosis (715 90) (M19 90)   16  Primary localized osteoarthritis of right knee (715 16) (M17 11)   17  Prostate cancer screening (V76 44) (Z12 5)   18  Restrictive lung disease (518 89) (J98 4)   19  Screening for cardiovascular, respiratory, and genitourinary diseases    (V81 2,V81 4,V81 6) (Z13 6,Z13 83,Z13 89)   20  Screening for diabetes mellitus (DM) (V77 1) (Z13 1)   21  Umbilical hernia (084 9) (K42 9)   22   Ventral hernia (553 20) (K43 9)    Past Medical History    · _ (844)   · History of Actinic keratosis (702 0) (L57 0)   · History of Acute bacterial prostatitis (601 0) (N41 0)   · History of Acute bacterial prostatitis (601 0) (N41 0)   · History of Acute maxillary sinusitis (461 0) (J01 00)   · History of Acute meniscal injury of right knee, initial encounter (959 7) (Y19 2I7R)   · History of Ankle pain, unspecified laterality   · History of Arthralgia of right knee (719 46) (M25 561)   · History of Benign Prostatic Hyperplasia (600 00)   · History of Elbow pain, unspecified laterality (719 42) (M25 529)   · History of Elevated BP (401 9) (I10)   · History of External Hemorrhoids (455 3)   · History of Glucosuria (791 5) (R81)   · History of acute bronchitis (V12 69) (Z87 09)   · History of acute prostatitis (V13 89) (P62 889)   · History of athlete's foot (V12 09) (Z86 19)   · History of backache (V13 59) (Z87 39)   · History of chest pain (V13 89) (T99 260)   · History of edema (V13 89) (A80 837)   · History of epistaxis (V12 69) (N26 227)   · History of headache (V13 89) (A89 160)   · History of hyperlipidemia (V12 29) (Z86 39)   · History of impetigo (V13 3) (Z87 2)   · History of scabies (V12 09) (Z86 19)   · History of sciatica (V12 49) (Z86 69)   · History of shortness of breath (V13 89) (I68 299)   · History of tinea corporis (V12 09) (Z86 19)   · History of tinea cruris (V12 09) (Z86 19)   · History of IFG (impaired fasting glucose) (790 21) (R73 01)   · History of Impacted cerumen, unspecified laterality (380 4) (H61 20)   · History of Knee pain, right anterior (719 46) (M25 561)   · History of Knee swelling, left (719 06) (M25 462)   · History of Morbid obesity with alveolar hypoventilation (278 03) (E66 2)   · History of Multiple joint pain (719 49) (M25 50)   · History of Olecranon bursitis (726 33) (M70 20)   · History of Palpitations (785 1) (R00 2)   · History of Paronychia of toe, unspecified laterality (681 11) (L03 039)   · History of Peripheral vertigo (386 10) (H81 399)   · History of Plantar fascial fibromatosis (728 71) (M72 2)   · History of Pruritus (698 9) (L29 9)   · History of Right knee pain (719 46) (M25 561)   · History of Right knee pain (719 46) (M25 561)   · History of Rotator Cuff Sprain (Capsule) (840 4)   · History of Shoulder joint pain, unspecified laterality   · History of Thyroid disorder screening (V77 0) (Z13 29)    Surgical History    · History of Complete Colonoscopy    Family History  Mother    · Denied: Family history of Colon cancer   · Denied: Family history of Crohn's disease without complication, unspecified  gastrointestinal tract location   · Family history of arthritis (V17 7) (Z82 61)   · Family history of diabetes mellitus (V18 0) (Z83 3)   · Family history of hypertension (V17 49) (Z82 49)   · Denied: Family history of liver disease  Father    · Denied: Family history of Colon cancer   · Denied: Family history of Crohn's disease without complication, unspecified  gastrointestinal tract location   · Denied: Family history of liver disease    The family history was reviewed and updated today  Social History    · Never A Smoker   · No alcohol use  The social history was reviewed and updated today  Current Meds   1  CoQ-10 100 MG Oral Capsule Extended Release; Once a day; Therapy: 17Wed4706 to Recorded   2  Daily Multivitamin Oral Capsule; take 1 capsule daily; Therapy: (HYFCGCOM:84PHK9385) to Recorded   3  Ginkoba TABS; Take 1 tablet daily; Therapy: (Recorded:28Kxb6383) to Recorded   4  Meloxicam 15 MG Oral Tablet; 1 Every Day, As Needed; Therapy: 46MFP7629 to (Last Rx:84Ckm0954)  Requested for: 26Dqr6702 Ordered   5  Probiotic CAPS Recorded   6  Selenium Sulfide 2 5 % External Lotion; MASSAGE IN AND LEAVE ON FOR 10 MINS   THEN RINSE USE DAILY FOR 7 DAYS; Therapy: 28JJP3429 to (Evaluate:25Mar2018)  Requested for: 25JUH2366; Last   Rx:71Hkg0272 Ordered    Allergies    1  Sulfa Drugs    Immunizations   1    PPSV  Temporarily Deferred: Pt refuses     Vitals  Signs    Systolic: 567  Diastolic: 94   Temperature: 98 3 F  Heart Rate: 86  Respiration: 18  Systolic: 312  Diastolic: 98  Height: 5 ft 9 in  Weight: 270 lb 6 oz  BMI Calculated: 39 93  BSA Calculated: 2 35    Health Management  History of colon polyps   COLONOSCOPY; every 3 years; Last 24Rah3175; Next Due: 47Dgm5189;  Active    Signatures   Electronically signed by : Maurice Bermudez DO; Jan 9 2018 11:43PM EST                       (Author)

## 2018-01-23 NOTE — RESULT NOTES
Discussion/Summary   Carlos- your abdominal ultrasound shows a fatty liver  No evidence of gallbladder disease  There are also tiny cysts in your kidneys, which are benign  ROBYN Trivedi Lacks     Verified Results  * 58 Ronda Hines 17TVP3619 08:46AM Dominic Prado Order Number: [id number]    - Patient Instructions: To schedule this appointment, please contact Central Scheduling at [phone]  Test Name Result Flag Reference   US ABDOMEN COMPLETE (Report)     ABDOMEN ULTRASOUND, COMPLETE      INDICATION: Left upper quadrant pain  COMPARISON: CT scan 8/30/2012 and 2/23/2010  TECHNIQUE:  Real-time ultrasound of the abdomen was performed with a curvilinear transducer with both volumetric sweeps and still imaging techniques  FINDINGS:     PANCREAS: Visualized portions of the pancreas are within normal limits  AORTA AND IVC: Visualized portions are normal for patient age  LIVER:   Size: Enlarged  The liver measures 20 cm in the midclavicular line  Contour: Surface contour is smooth  Parenchyma: Increased parenchymal echogenicity compatible with fatty infiltration  No evidence of suspicious mass  Limited imaging of the main portal vein shows it to be patent and hepatopetal      BILIARY:   The gallbladder is normal in caliber  No wall thickening or pericholecystic fluid  No stones or sludge identified  There is a tiny polyp measuring 5 mm  Sonographic [place] sign is negative  No intrahepatic biliary dilatation  CBD measures 5 mm  No choledocholithiasis  KIDNEY:    Right kidney measures 12 4 cm  Simple cyst measures 7 0 x 5 4 x 5 9 cm  Left kidney measures 12 8 cm  Suboptimally visualized exophytic cyst at the posterior mid to upper pole measuring 2 1 x 2 0 x 2 2 cm  SPLEEN:    Measures 12 cm  Within normal limits  ASCITES: None  IMPRESSION:     Hepatomegaly with fatty infiltration  Tiny gallbladder polyp       Bilateral renal cysts         Workstation performed: [id number]     Signed by:   Enriqueta Adams MD   1/15/18       Signatures   Electronically signed by : Miguel Hu; Damien 15 2018  3:24PM EST                       (Author)

## 2018-02-06 ENCOUNTER — TELEPHONE (OUTPATIENT)
Dept: FAMILY MEDICINE CLINIC | Facility: CLINIC | Age: 73
End: 2018-02-06

## 2018-02-06 PROBLEM — M25.561 ARTHRALGIA OF RIGHT KNEE: Status: ACTIVE | Noted: 2017-04-12

## 2018-02-06 PROBLEM — M17.11 PRIMARY LOCALIZED OSTEOARTHRITIS OF RIGHT KNEE: Status: ACTIVE | Noted: 2017-04-28

## 2018-02-06 PROBLEM — R10.12 ABDOMINAL PAIN, LEFT UPPER QUADRANT: Status: ACTIVE | Noted: 2018-01-03

## 2018-02-06 PROBLEM — K43.9 VENTRAL HERNIA: Status: ACTIVE | Noted: 2018-01-03

## 2018-02-06 PROBLEM — M23.203 OLD PERIPHERAL TEAR OF MEDIAL MENISCUS OF RIGHT KNEE: Status: ACTIVE | Noted: 2017-04-28

## 2018-02-06 PROBLEM — I10 BENIGN ESSENTIAL HTN: Status: ACTIVE | Noted: 2018-01-09

## 2018-02-06 PROBLEM — M23.42: Status: ACTIVE | Noted: 2017-04-12

## 2018-02-06 PROBLEM — R10.13 DYSPEPSIA: Status: ACTIVE | Noted: 2017-01-12

## 2018-02-06 RX ORDER — ASCORBIC ACID 1000 MG
1 TABLET ORAL DAILY
COMMUNITY
End: 2020-03-10

## 2018-02-06 RX ORDER — MELOXICAM 15 MG/1
1 TABLET ORAL DAILY PRN
COMMUNITY
Start: 2013-03-13 | End: 2019-02-21 | Stop reason: SDUPTHER

## 2018-02-06 RX ORDER — SELENIUM SULFIDE 2.5 MG/100ML
LOTION TOPICAL
COMMUNITY
Start: 2010-12-01 | End: 2018-03-30

## 2018-02-06 RX ORDER — RAMIPRIL 5 MG/1
1 CAPSULE ORAL DAILY
COMMUNITY
Start: 2018-01-09 | End: 2018-04-05 | Stop reason: SDUPTHER

## 2018-02-07 ENCOUNTER — TELEPHONE (OUTPATIENT)
Dept: FAMILY MEDICINE CLINIC | Facility: CLINIC | Age: 73
End: 2018-02-07

## 2018-02-07 NOTE — TELEPHONE ENCOUNTER
preop clearance request from Dr Augustina Kendall in [address] [place] [address]    surg date 2/24/18    Needs preop visit and needs labs/ekg received before visit (mentioned in letter)

## 2018-02-15 PROBLEM — Z00.00 HEALTHCARE MAINTENANCE: Status: ACTIVE | Noted: 2018-02-15

## 2018-02-16 ENCOUNTER — OFFICE VISIT (OUTPATIENT)
Dept: FAMILY MEDICINE CLINIC | Facility: CLINIC | Age: 73
End: 2018-02-16
Payer: COMMERCIAL

## 2018-02-16 VITALS
DIASTOLIC BLOOD PRESSURE: 80 MMHG | RESPIRATION RATE: 20 BRPM | WEIGHT: 271 LBS | TEMPERATURE: 98.4 F | HEIGHT: 70 IN | HEART RATE: 82 BPM | SYSTOLIC BLOOD PRESSURE: 130 MMHG | BODY MASS INDEX: 38.8 KG/M2

## 2018-02-16 DIAGNOSIS — Z00.00 HEALTHCARE MAINTENANCE: Primary | ICD-10-CM

## 2018-02-16 DIAGNOSIS — M17.11 PRIMARY LOCALIZED OSTEOARTHRITIS OF RIGHT KNEE: ICD-10-CM

## 2018-02-16 DIAGNOSIS — R10.13 DYSPEPSIA: ICD-10-CM

## 2018-02-16 DIAGNOSIS — M25.561 ARTHRALGIA OF RIGHT KNEE: ICD-10-CM

## 2018-02-16 DIAGNOSIS — I10 BENIGN ESSENTIAL HTN: ICD-10-CM

## 2018-02-16 DIAGNOSIS — Z01.818 PRE-OP EXAMINATION: ICD-10-CM

## 2018-02-16 DIAGNOSIS — M23.203 OLD PERIPHERAL TEAR OF MEDIAL MENISCUS OF RIGHT KNEE: ICD-10-CM

## 2018-02-16 DIAGNOSIS — G47.33 OBSTRUCTIVE SLEEP APNEA: ICD-10-CM

## 2018-02-16 DIAGNOSIS — S83.8X1A ACUTE MENISCAL INJURY OF RIGHT KNEE, INITIAL ENCOUNTER: ICD-10-CM

## 2018-02-16 PROBLEM — R10.12 ABDOMINAL PAIN, LEFT UPPER QUADRANT: Status: RESOLVED | Noted: 2018-01-03 | Resolved: 2018-02-16

## 2018-02-16 PROCEDURE — 99244 OFF/OP CNSLTJ NEW/EST MOD 40: CPT | Performed by: FAMILY MEDICINE

## 2018-02-16 PROCEDURE — 93000 ELECTROCARDIOGRAM COMPLETE: CPT | Performed by: FAMILY MEDICINE

## 2018-02-16 NOTE — PROGRESS NOTES
Assessment/Plan:  Medical clearance for right knee arthroscopy done at request of Dr Gaye Gabriel    Pt has not had any preoperative testing ordered/done by surgeon/facility so will attach most recent labs  EKG was done in our office and attached  EKG is normal   Hypertension is stable  Prediabetes dietary counseling advised    Pt is medically cleared for procedure, nsaids on hold 1w preop as well as herbals  No outside preop labs or studies were received or reviewed  Diagnoses and all orders for this visit:    Healthcare maintenance    Benign essential HTN    Dyspepsia    Arthralgia of right knee    Acute meniscal injury of right knee, initial encounter    Obstructive sleep apnea    Old peripheral tear of medial meniscus of right knee    Primary localized osteoarthritis of right knee          CATHY stable  Obesity unchanged    No Follow-up on file  Subjective:      Patient ID: Jamie Dupont is a 67 y o  male  Chief Complaint   Patient presents with    Pre-op Exam     right knee       Takes ramipril, htn controlled  Takes at night so he will take the night before as usual    Takes meloxicam daily , 15mg for pain, also with tylenol  Aware needs to stop mobic and nsaids for 1 week before    Takes fish oil, he knows to stop 1w before    Prior surgeries: prostate surgery and left knee arthroscopic surgery    No hx of excess bleeding/clotting/dvt/pe/anesthesia reaction    No MCCABE per pt  No CAD hx per pt  Normal cardiac stress test Dr Dinorah Torres in [place] reportedly normal            The following portions of the patient's history were reviewed and updated as appropriate: allergies, current medications, past family history, past medical history, past social history, past surgical history and problem list     Review of Systems   Constitutional: Negative for fever  Cardiovascular: Negative for chest pain  Musculoskeletal: Positive for arthralgias  Neurological: Negative for syncope           Current Outpatient Prescriptions   Medication Sig Dispense Refill    Ginkgo Biloba 40 MG TABS Take 1 tablet by mouth daily      meloxicam (MOBIC) 15 mg tablet Take 1 tablet by mouth daily as needed      Probiotic Product (PROBIOTIC-10 PO) Take 1 capsule by mouth daily      ramipril (ALTACE) 5 mg capsule Take 1 capsule by mouth daily      Rye Grass Pollen Ext-Quercetin (PROSTATE PQ PO) Take by mouth 2 (two) times a day      Saw Palmetto, Serenoa repens, 450 MG CAPS Take by mouth 2 (two) times a day 2 tabs each dose      Coenzyme Q10 (COQ-10) 100 MG CAPS Take by mouth daily      diclofenac sodium (VOLTAREN) 1 % Place on the skin      Lactobacillus (ACIDOPHILUS PROBIOTIC) 10 MG TABS Take by mouth 2 (two) times a day      Multiple Vitamins-Minerals (DAILY MULTIVITAMIN PO) Take 1 capsule by mouth daily      pantoprazole (PROTONIX) 40 mg tablet Take 40 mg by mouth every morning      selenium sulfide (SELSUN) 2 5 % shampoo Apply topically       No current facility-administered medications for this visit  Objective: There were no vitals taken for this visit  Physical Exam   Constitutional: He appears well-developed  HENT:   Head: Normocephalic  Eyes: Conjunctivae are normal    Neck: Neck supple  Cardiovascular: Normal rate and intact distal pulses  Pulmonary/Chest: Effort normal  No respiratory distress  Abdominal: Soft  Musculoskeletal: He exhibits edema and tenderness  He exhibits no deformity  Neurological: He is alert  Skin: Skin is warm and dry  Psychiatric: His behavior is normal  Thought content normal    Nursing note and vitals reviewed  There is pain with right knee rom and painful gait         Rosita Lynn DO

## 2018-03-30 ENCOUNTER — APPOINTMENT (EMERGENCY)
Dept: RADIOLOGY | Facility: HOSPITAL | Age: 73
End: 2018-03-30
Payer: COMMERCIAL

## 2018-03-30 ENCOUNTER — HOSPITAL ENCOUNTER (EMERGENCY)
Facility: HOSPITAL | Age: 73
Discharge: HOME | End: 2018-03-30
Attending: EMERGENCY MEDICINE
Payer: COMMERCIAL

## 2018-03-30 VITALS
OXYGEN SATURATION: 96 % | SYSTOLIC BLOOD PRESSURE: 137 MMHG | HEART RATE: 88 BPM | RESPIRATION RATE: 18 BRPM | TEMPERATURE: 97.9 F | DIASTOLIC BLOOD PRESSURE: 75 MMHG

## 2018-03-30 DIAGNOSIS — S22.39XA FRACTURE OF ONE RIB, UNSP SIDE, INIT FOR CLOS FX: Primary | ICD-10-CM

## 2018-03-30 LAB
ATRIAL RATE: 84 BPM
P AXIS: 52 DEGREES
PR INTERVAL: 150 MS
QRS AXIS: 21 DEGREES
QRSD INTERVAL: 76 MS
QT INTERVAL: 370 MS
QTC INTERVAL: 437 MS
T WAVE AXIS: 54 DEGREES
VENTRICULAR RATE: 84 BPM

## 2018-03-30 PROCEDURE — 93010 ELECTROCARDIOGRAM REPORT: CPT | Performed by: INTERNAL MEDICINE

## 2018-03-30 PROCEDURE — 71100 X-RAY EXAM RIBS UNI 2 VIEWS: CPT

## 2018-03-30 PROCEDURE — 93005 ELECTROCARDIOGRAM TRACING: CPT

## 2018-03-30 PROCEDURE — 71046 X-RAY EXAM CHEST 2 VIEWS: CPT

## 2018-03-30 PROCEDURE — 99285 EMERGENCY DEPT VISIT HI MDM: CPT

## 2018-03-30 RX ORDER — IBUPROFEN 600 MG/1
600 TABLET ORAL ONCE
Status: DISCONTINUED | OUTPATIENT
Start: 2018-03-30 | End: 2018-03-30 | Stop reason: HOSPADM

## 2018-03-30 NOTE — DISCHARGE INSTRUCTIONS
Rib Fracture   WHAT YOU NEED TO KNOW:   A rib fracture is a crack or break in a rib bone  Rib fractures usually heal within 6 weeks  You should be able to return to normal activities before that time  Do not wrap anything around your body to try to splint your ribs  This can prevent you from taking deep breaths and increases your risk for pneumonia  DISCHARGE INSTRUCTIONS:   Call 911 for any of the following:   · You have trouble breathing  · You have new or increased pain  Return to the emergency department if:   · Your pain does not get better, even after treatment  · You have a fever or a cough  Contact your healthcare provider if:   · You have questions or concerns about your condition or care  Medicines:   · NSAIDs  help decrease swelling and pain  NSAIDs are available without a doctor's order  Ask your healthcare provider which medicine is right for you  Ask how much to take and when to take it  Take as directed  NSAIDs can cause stomach bleeding and kidney problems if not taken correctly  · Prescription pain medicine  may be given  Ask your healthcare provider how to take this medicine safely  Some prescription pain medicines contain acetaminophen  Do not take other medicines that contain acetaminophen without talking to your healthcare provider  Too much acetaminophen may cause liver damage  Prescription pain medicine may cause constipation  Ask your healthcare provider how to prevent or treat constipation  · Take your medicine as directed  Contact your healthcare provider if you think your medicine is not helping or if you have side effects  Tell him or her if you are allergic to any medicine  Keep a list of the medicines, vitamins, and herbs you take  Include the amounts, and when and why you take them  Bring the list or the pill bottles to follow-up visits  Carry your medicine list with you in case of an emergency    Follow up with your healthcare provider as directed:  Write down your questions so you remember to ask them during your visits  Deep breathing:  Deep breathing will decrease your risk for pneumonia  Hug a pillow on the injured side while doing this exercise, to decrease pain  Take a deep breath and hold it for as long as possible  You should let the air out and then cough strongly  Deep breaths help open your airway  You may be given an incentive spirometer to help take deep breaths  Put the plastic piece in your mouth  Take a slow, deep breath  You should then let the air out and cough  Repeat these steps 10 times every hour  Rest:  Rest and limit activity to decrease swelling and pain, and allow your injury to heal  Avoid activities that may cause more pain or damage to your ribs such as, pulling, pushing, and lifting  As your pain decreases, begin movements slowly  Take short walks between rest periods  Ice:  Apply ice on the fractured area for 15 to 20 minutes every hour or as directed  Use an ice pack or put crushed ice in a plastic bag  Cover it with a towel  Ice helps prevent tissue damage and decreases swelling and pain  © 2017 [facility] Information is for End User's use only and may not be sold, redistributed or otherwise used for commercial purposes  All illustrations and images included in CareNotes® are the copyrighted property of A [employer] M [employer]  or [employer]  The above information is an  only  It is not intended as medical advice for individual conditions or treatments  Talk to your doctor, nurse or pharmacist before following any medical regimen to see if it is safe and effective for you  Rib Fracture   WHAT YOU NEED TO KNOW:   A rib fracture is a crack or break in a rib bone  Rib fractures usually heal within 6 weeks  You should be able to return to normal activities before that time  Do not wrap anything around your body to try to splint your ribs   This can prevent you from taking deep breaths and increases your risk for pneumonia  DISCHARGE INSTRUCTIONS:   Call 911 for any of the following:   · You have trouble breathing  · You have new or increased pain  Return to the emergency department if:   · Your pain does not get better, even after treatment  · You have a fever or a cough  Contact your healthcare provider if:   · You have questions or concerns about your condition or care  Medicines:   · NSAIDs  help decrease swelling and pain  NSAIDs are available without a doctor's order  Ask your healthcare provider which medicine is right for you  Ask how much to take and when to take it  Take as directed  NSAIDs can cause stomach bleeding and kidney problems if not taken correctly  · Prescription pain medicine  may be given  Ask your healthcare provider how to take this medicine safely  Some prescription pain medicines contain acetaminophen  Do not take other medicines that contain acetaminophen without talking to your healthcare provider  Too much acetaminophen may cause liver damage  Prescription pain medicine may cause constipation  Ask your healthcare provider how to prevent or treat constipation  · Take your medicine as directed  Contact your healthcare provider if you think your medicine is not helping or if you have side effects  Tell him or her if you are allergic to any medicine  Keep a list of the medicines, vitamins, and herbs you take  Include the amounts, and when and why you take them  Bring the list or the pill bottles to follow-up visits  Carry your medicine list with you in case of an emergency  Follow up with your healthcare provider as directed:  Write down your questions so you remember to ask them during your visits  Deep breathing:  Deep breathing will decrease your risk for pneumonia  Hug a pillow on the injured side while doing this exercise, to decrease pain  Take a deep breath and hold it for as long as possible  You should let the air out and then cough strongly   Deep breaths help open your airway  You may be given an incentive spirometer to help take deep breaths  Put the plastic piece in your mouth  Take a slow, deep breath  You should then let the air out and cough  Repeat these steps 10 times every hour  Rest:  Rest and limit activity to decrease swelling and pain, and allow your injury to heal  Avoid activities that may cause more pain or damage to your ribs such as, pulling, pushing, and lifting  As your pain decreases, begin movements slowly  Take short walks between rest periods  Ice:  Apply ice on the fractured area for 15 to 20 minutes every hour or as directed  Use an ice pack or put crushed ice in a plastic bag  Cover it with a towel  Ice helps prevent tissue damage and decreases swelling and pain  © 2017 [facility] Information is for End User's use only and may not be sold, redistributed or otherwise used for commercial purposes  All illustrations and images included in CareNotes® are the copyrighted property of A [employer] M [employer]  or [employer] Siu  The above information is an  only  It is not intended as medical advice for individual conditions or treatments  Talk to your doctor, nurse or pharmacist before following any medical regimen to see if it is safe and effective for you

## 2018-03-30 NOTE — ED NOTES
Pt waiting for results of X-ray  Pt watching TV  Laughing at show  No distress noted         Vamshi Byers RN  03/30/18 1524
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
## 2018-04-02 ENCOUNTER — TELEPHONE (OUTPATIENT)
Dept: ADMINISTRATIVE | Facility: OTHER | Age: 73
End: 2018-04-02

## 2018-04-02 NOTE — TELEPHONE ENCOUNTER
I spoke with patient who advised me that he was doing ok  He declined to make a follow up appointment  I advised patient if he had any problems to call [facility]   ED visit documented in ED log

## 2018-04-05 DIAGNOSIS — I10 BENIGN ESSENTIAL HTN: Primary | ICD-10-CM

## 2018-04-05 RX ORDER — RAMIPRIL 5 MG/1
CAPSULE ORAL
Qty: 90 CAPSULE | Refills: 1 | Status: SHIPPED | OUTPATIENT
Start: 2018-04-05 | End: 2018-09-28 | Stop reason: SDUPTHER

## 2018-05-10 RX ORDER — TRAMADOL HYDROCHLORIDE 50 MG/1
50 TABLET ORAL EVERY 12 HOURS PRN
Refills: 1 | COMMUNITY
Start: 2018-04-30 | End: 2018-10-06 | Stop reason: ALTCHOICE

## 2018-05-10 RX ORDER — OXYCODONE HYDROCHLORIDE AND ACETAMINOPHEN 5; 325 MG/1; MG/1
TABLET ORAL
COMMUNITY
Start: 2018-02-16 | End: 2018-10-06 | Stop reason: ALTCHOICE

## 2018-05-11 ENCOUNTER — OFFICE VISIT (OUTPATIENT)
Dept: FAMILY MEDICINE CLINIC | Facility: CLINIC | Age: 73
End: 2018-05-11
Payer: COMMERCIAL

## 2018-05-11 VITALS
HEIGHT: 69 IN | BODY MASS INDEX: 39.55 KG/M2 | SYSTOLIC BLOOD PRESSURE: 130 MMHG | RESPIRATION RATE: 18 BRPM | WEIGHT: 267 LBS | HEART RATE: 72 BPM | TEMPERATURE: 98.2 F | DIASTOLIC BLOOD PRESSURE: 76 MMHG

## 2018-05-11 DIAGNOSIS — I10 BENIGN ESSENTIAL HTN: Primary | ICD-10-CM

## 2018-05-11 DIAGNOSIS — R73.03 PREDIABETES: ICD-10-CM

## 2018-05-11 DIAGNOSIS — G47.33 OBSTRUCTIVE SLEEP APNEA: ICD-10-CM

## 2018-05-11 DIAGNOSIS — E66.9 OBESITY (BMI 30-39.9): ICD-10-CM

## 2018-05-11 PROCEDURE — 3008F BODY MASS INDEX DOCD: CPT | Performed by: FAMILY MEDICINE

## 2018-05-11 PROCEDURE — 1101F PT FALLS ASSESS-DOCD LE1/YR: CPT | Performed by: FAMILY MEDICINE

## 2018-05-11 PROCEDURE — 99214 OFFICE O/P EST MOD 30 MIN: CPT | Performed by: FAMILY MEDICINE

## 2018-05-11 PROCEDURE — 3078F DIAST BP <80 MM HG: CPT | Performed by: FAMILY MEDICINE

## 2018-05-11 PROCEDURE — 3075F SYST BP GE 130 - 139MM HG: CPT | Performed by: FAMILY MEDICINE

## 2018-05-11 NOTE — PROGRESS NOTES
Assessment/Plan:    No problem-specific Assessment & Plan notes found for this encounter  prediab unchanged, aware of results   htn stable  Knee pain ongoing  CAD? Refer to cardio if willing  Copy of consult given  ekg normal, copy and written statement given (for insurance company)  kevan untreated, he is aware  Offer written statement that he does not have prediabetes if labs prove this in 6m     Diagnoses and all orders for this visit:    Benign essential HTN  -     Comprehensive metabolic panel; Future    Prediabetes  -     Comprehensive metabolic panel; Future  -     HEMOGLOBIN A1C W/ EAG ESTIMATION; Future    Obstructive sleep apnea    Other orders  -     oxyCODONE-acetaminophen (PERCOCET) 5-325 mg per tablet; Earliest Fill Date: 2/16/18   -     traMADol (ULTRAM) 50 mg tablet; Take 50 mg by mouth every 12 (twelve) hours as needed              Return in about 6 months (around 11/11/2018) for Recheck  Subjective:      Patient ID: Oskar Hardy is a 67 y o  male  Chief Complaint   Patient presents with    Follow-up     review long term insurance papers  klm lpn       HPI   Had issues with application for long term care insurance  Reviewed records about CAD  Prediabetes aware  Accepts KEVAN, not using cpap  ekg normal Jan2018  Plans to do more TLC  Ongoing right knee pain  htn stable, tolerates medications, no cough    The following portions of the patient's history were reviewed and updated as appropriate: allergies, current medications, past family history, past medical history, past social history, past surgical history and problem list     Review of Systems   Constitutional: Negative for fever  Cardiovascular: Negative for chest pain           Current Outpatient Prescriptions   Medication Sig Dispense Refill    Coenzyme Q10 (COQ-10) 100 MG CAPS Take by mouth daily      Ginkgo Biloba 40 MG TABS Take 1 tablet by mouth daily      meloxicam (MOBIC) 15 mg tablet Take 1 tablet by mouth daily as needed  Multiple Vitamins-Minerals (DAILY MULTIVITAMIN PO) Take 1 capsule by mouth daily      Probiotic Product (PROBIOTIC-10 PO) Take 1 capsule by mouth daily      ramipril (ALTACE) 5 mg capsule TAKE 1 CAPSULE DAILY 90 capsule 1    Rye Grass Pollen Ext-Quercetin (PROSTATE PQ PO) Take by mouth 2 (two) times a day      traMADol (ULTRAM) 50 mg tablet Take 50 mg by mouth every 12 (twelve) hours as needed  1    oxyCODONE-acetaminophen (PERCOCET) 5-325 mg per tablet        No current facility-administered medications for this visit  Objective:    /76   Pulse 72   Temp 98 2 °F (36 8 °C)   Resp 18   Ht 5' 9" (1 753 m)   Wt 121 kg (267 lb)   BMI 39 43 kg/m²        Physical Exam   Constitutional: He appears well-developed  HENT:   Head: Normocephalic  Eyes: Conjunctivae are normal    Neck: Neck supple  Cardiovascular: Normal rate and intact distal pulses  Pulmonary/Chest: Effort normal  No respiratory distress  Abdominal: Soft  Musculoskeletal: He exhibits no edema or deformity  Neurological: He is alert  Skin: Skin is warm and dry  Psychiatric: His behavior is normal  Thought content normal    Nursing note and vitals reviewed      right knee pain with rom postop     mild leg swelling b/l    Colin Lemus DO

## 2018-07-08 LAB
ALBUMIN SERPL-MCNC: 3.8 G/DL (ref 3.6–5.1)
ALBUMIN/GLOB SERPL: 1.1 (CALC) (ref 1–2.5)
ALP SERPL-CCNC: 78 U/L (ref 40–115)
ALT SERPL-CCNC: 13 U/L (ref 9–46)
AST SERPL-CCNC: 13 U/L (ref 10–35)
BILIRUB SERPL-MCNC: 0.5 MG/DL (ref 0.2–1.2)
BUN SERPL-MCNC: 16 MG/DL (ref 7–25)
BUN/CREAT SERPL: ABNORMAL (CALC) (ref 6–22)
CALCIUM SERPL-MCNC: 9.2 MG/DL (ref 8.6–10.3)
CHLORIDE SERPL-SCNC: 104 MMOL/L (ref 98–110)
CO2 SERPL-SCNC: 25 MMOL/L (ref 20–31)
CREAT SERPL-MCNC: 1.01 MG/DL (ref 0.7–1.18)
EST. AVERAGE GLUCOSE BLD GHB EST-MCNC: 126 (CALC)
EST. AVERAGE GLUCOSE BLD GHB EST-SCNC: 7 (CALC)
GLOBULIN SER CALC-MCNC: 3.4 G/DL (CALC) (ref 1.9–3.7)
GLUCOSE SERPL-MCNC: 116 MG/DL (ref 65–99)
HBA1C MFR BLD: 6 % OF TOTAL HGB
POTASSIUM SERPL-SCNC: 4.4 MMOL/L (ref 3.5–5.3)
PROT SERPL-MCNC: 7.2 G/DL (ref 6.1–8.1)
SL AMB EGFR AFRICAN AMERICAN: 86 ML/MIN/1.73M2
SL AMB EGFR NON AFRICAN AMERICAN: 74 ML/MIN/1.73M2
SODIUM SERPL-SCNC: 138 MMOL/L (ref 135–146)

## 2018-09-28 DIAGNOSIS — I10 BENIGN ESSENTIAL HTN: ICD-10-CM

## 2018-09-28 RX ORDER — RAMIPRIL 5 MG/1
CAPSULE ORAL
Qty: 90 CAPSULE | Refills: 1 | Status: SHIPPED | OUTPATIENT
Start: 2018-09-28 | End: 2019-02-21 | Stop reason: SDUPTHER

## 2018-10-06 ENCOUNTER — OFFICE VISIT (OUTPATIENT)
Dept: FAMILY MEDICINE CLINIC | Facility: CLINIC | Age: 73
End: 2018-10-06
Payer: COMMERCIAL

## 2018-10-06 VITALS
RESPIRATION RATE: 22 BRPM | DIASTOLIC BLOOD PRESSURE: 80 MMHG | SYSTOLIC BLOOD PRESSURE: 140 MMHG | HEIGHT: 69 IN | BODY MASS INDEX: 39.55 KG/M2 | WEIGHT: 267 LBS | TEMPERATURE: 98.5 F | HEART RATE: 82 BPM

## 2018-10-06 DIAGNOSIS — B35.1 FUNGAL INFECTION OF NAIL: Primary | ICD-10-CM

## 2018-10-06 PROCEDURE — 99213 OFFICE O/P EST LOW 20 MIN: CPT | Performed by: FAMILY MEDICINE

## 2018-10-06 PROCEDURE — 1036F TOBACCO NON-USER: CPT | Performed by: FAMILY MEDICINE

## 2018-10-06 PROCEDURE — 1160F RVW MEDS BY RX/DR IN RCRD: CPT | Performed by: FAMILY MEDICINE

## 2018-10-06 RX ORDER — TERBINAFINE HYDROCHLORIDE 250 MG/1
250 TABLET ORAL DAILY
Qty: 90 TABLET | Refills: 0 | Status: SHIPPED | OUTPATIENT
Start: 2018-10-06 | End: 2019-01-04

## 2018-10-06 NOTE — PROGRESS NOTES
Assessment/Plan:    Problem List Items Addressed This Visit     None      Visit Diagnoses     Fungal infection of nail    -  Primary    Relevant Medications    terbinafine (LamISIL) 250 mg tablet    Other Relevant Orders    AST    ALT    Ambulatory referral to Podiatry      Pt has thisk yellow deformed nails this is most probablybis problem  Looked in chart his lft's have been normal   Will write for lamisil with lft's in 6 weeks  Pt send to podiatry they may decide to remove nail  He haslo has a hammer toe and I think this may need to be corrected to protect from skin breakdown    There are no Patient Instructions on file for this visit  No Follow-up on file  Subjective:      Patient ID: Clifton Bradshaw is a 67 y o  male  Chief Complaint   Patient presents with    Toe Pain     right toe       Pt states he has pain in his great toe  States it has been a week and a half  Rt footgrreat toe  Pt states he thinks he has an ingrown toe nail        The following portions of the patient's history were reviewed and updated as appropriate: allergies, current medications, past family history, past medical history, past social history, past surgical history and problem list     Review of Systems   Constitutional: Negative for activity change, appetite change, chills, diaphoresis, fatigue, fever and unexpected weight change  HENT: Negative for congestion, dental problem, ear pain, mouth sores, sinus pain, sinus pressure, sore throat and trouble swallowing  Eyes: Negative for photophobia, discharge and itching  Respiratory: Negative for apnea, chest tightness and shortness of breath  Cardiovascular: Negative for chest pain, palpitations and leg swelling  Gastrointestinal: Negative for abdominal distention, abdominal pain, blood in stool, nausea and vomiting  Endocrine: Negative for cold intolerance, heat intolerance, polydipsia, polyphagia and polyuria     Genitourinary: Negative for difficulty urinating  Musculoskeletal: Negative for arthralgias  Skin: Positive for color change and rash  Negative for wound  Neurological: Negative for dizziness, syncope, speech difficulty and headaches  Hematological: Negative for adenopathy  Psychiatric/Behavioral: Negative for agitation and behavioral problems  Current Outpatient Prescriptions   Medication Sig Dispense Refill    Coenzyme Q10 (COQ-10) 100 MG CAPS Take by mouth daily      Ginkgo Biloba 40 MG TABS Take 1 tablet by mouth daily      meloxicam (MOBIC) 15 mg tablet Take 1 tablet by mouth daily as needed      Multiple Vitamins-Minerals (DAILY MULTIVITAMIN PO) Take 1 capsule by mouth daily      Probiotic Product (PROBIOTIC-10 PO) Take 1 capsule by mouth daily      ramipril (ALTACE) 5 mg capsule TAKE 1 CAPSULE DAILY 90 capsule 1    terbinafine (LamISIL) 250 mg tablet Take 1 tablet (250 mg total) by mouth daily for 90 days 90 tablet 0     No current facility-administered medications for this visit  Objective:    /80   Pulse 82   Temp 98 5 °F (36 9 °C)   Resp 22   Ht 5' 9" (1 753 m)   Wt 121 kg (267 lb)   BMI 39 43 kg/m²        Physical Exam   Constitutional: He appears well-developed and well-nourished  No distress  HENT:   Head: Normocephalic and atraumatic  Right Ear: External ear normal    Left Ear: External ear normal    Nose: Nose normal    Mouth/Throat: Oropharynx is clear and moist  No oropharyngeal exudate  Eyes: Pupils are equal, round, and reactive to light  EOM are normal  Right eye exhibits no discharge  Left eye exhibits no discharge  No scleral icterus  Neck: No thyromegaly present  Cardiovascular: Normal rate and normal heart sounds  No murmur heard  Pulmonary/Chest: Effort normal and breath sounds normal  No respiratory distress  He has no wheezes  Abdominal: Soft  Bowel sounds are normal  He exhibits no distension and no mass  There is no tenderness  There is no rebound and no guarding  Musculoskeletal: Normal range of motion  Neurological: He is alert  He displays normal reflexes  Coordination normal    Skin: Skin is warm and dry  No rash noted  He is not diaphoretic  No erythema  Psychiatric: He has a normal mood and affect  His behavior is normal    Nursing note and vitals reviewed               Carlos Puri DO

## 2018-10-21 LAB
ALT SERPL-CCNC: 15 IU/L (ref 0–44)
AST SERPL-CCNC: 16 IU/L (ref 0–40)

## 2019-02-11 ENCOUNTER — TELEPHONE (OUTPATIENT)
Dept: GASTROENTEROLOGY | Facility: CLINIC | Age: 74
End: 2019-02-11

## 2019-02-11 NOTE — TELEPHONE ENCOUNTER
Santiago Neely pt    Please advise when the pt is due for a colon/egd   Last procedure was on 2/22/17 [phone]

## 2019-02-11 NOTE — TELEPHONE ENCOUNTER
Please advise patient, based on biopsy results, Dr Radha Dao had recommended that he have colonoscopy in 3 years from his last one, which would make him due in February 2020    Thank you

## 2019-02-21 ENCOUNTER — TELEPHONE (OUTPATIENT)
Dept: FAMILY MEDICINE CLINIC | Facility: CLINIC | Age: 74
End: 2019-02-21

## 2019-02-21 DIAGNOSIS — M25.561 ARTHRALGIA OF RIGHT KNEE: Primary | ICD-10-CM

## 2019-02-21 DIAGNOSIS — I10 BENIGN ESSENTIAL HTN: ICD-10-CM

## 2019-02-21 RX ORDER — MELOXICAM 15 MG/1
TABLET ORAL
Qty: 90 TABLET | Refills: 0 | Status: SHIPPED | OUTPATIENT
Start: 2019-02-21 | End: 2019-05-20 | Stop reason: SDUPTHER

## 2019-02-21 RX ORDER — RAMIPRIL 5 MG/1
5 CAPSULE ORAL DAILY
Qty: 90 CAPSULE | Refills: 1 | Status: SHIPPED | OUTPATIENT
Start: 2019-02-21 | End: 2019-12-24

## 2019-02-27 ENCOUNTER — TELEPHONE (OUTPATIENT)
Dept: FAMILY MEDICINE CLINIC | Facility: CLINIC | Age: 74
End: 2019-02-27

## 2019-03-10 ENCOUNTER — TELEPHONE (OUTPATIENT)
Dept: FAMILY MEDICINE CLINIC | Facility: CLINIC | Age: 74
End: 2019-03-10

## 2019-03-11 NOTE — TELEPHONE ENCOUNTER
His appt this week should be scheduled as a follow up, not an AWV    Please leave as 30min so I can do an AWV in addition to his appt

## 2019-03-13 ENCOUNTER — OFFICE VISIT (OUTPATIENT)
Dept: FAMILY MEDICINE CLINIC | Facility: CLINIC | Age: 74
End: 2019-03-13
Payer: COMMERCIAL

## 2019-03-13 VITALS
HEIGHT: 69 IN | BODY MASS INDEX: 34.33 KG/M2 | HEART RATE: 72 BPM | TEMPERATURE: 98.9 F | RESPIRATION RATE: 16 BRPM | SYSTOLIC BLOOD PRESSURE: 114 MMHG | DIASTOLIC BLOOD PRESSURE: 70 MMHG | WEIGHT: 231.8 LBS

## 2019-03-13 DIAGNOSIS — I10 BENIGN ESSENTIAL HTN: ICD-10-CM

## 2019-03-13 DIAGNOSIS — R73.03 PREDIABETES: ICD-10-CM

## 2019-03-13 DIAGNOSIS — Z00.00 MEDICARE ANNUAL WELLNESS VISIT, SUBSEQUENT: Primary | ICD-10-CM

## 2019-03-13 DIAGNOSIS — Z13.83 SCREENING FOR CARDIOVASCULAR, RESPIRATORY, AND GENITOURINARY DISEASES: ICD-10-CM

## 2019-03-13 DIAGNOSIS — Z13.89 SCREENING FOR CARDIOVASCULAR, RESPIRATORY, AND GENITOURINARY DISEASES: ICD-10-CM

## 2019-03-13 DIAGNOSIS — E66.9 OBESITY (BMI 30-39.9): ICD-10-CM

## 2019-03-13 DIAGNOSIS — M19.90 OSTEOARTHRITIS, UNSPECIFIED OSTEOARTHRITIS TYPE, UNSPECIFIED SITE: ICD-10-CM

## 2019-03-13 DIAGNOSIS — G47.33 OBSTRUCTIVE SLEEP APNEA: ICD-10-CM

## 2019-03-13 DIAGNOSIS — Z13.6 SCREENING FOR CARDIOVASCULAR, RESPIRATORY, AND GENITOURINARY DISEASES: ICD-10-CM

## 2019-03-13 DIAGNOSIS — Z13.1 SCREENING FOR DIABETES MELLITUS (DM): ICD-10-CM

## 2019-03-13 PROBLEM — R10.13 DYSPEPSIA: Status: RESOLVED | Noted: 2017-01-12 | Resolved: 2019-03-13

## 2019-03-13 PROBLEM — M23.42: Status: RESOLVED | Noted: 2017-04-12 | Resolved: 2019-03-13

## 2019-03-13 PROCEDURE — 3074F SYST BP LT 130 MM HG: CPT | Performed by: FAMILY MEDICINE

## 2019-03-13 PROCEDURE — 3008F BODY MASS INDEX DOCD: CPT | Performed by: FAMILY MEDICINE

## 2019-03-13 PROCEDURE — 1036F TOBACCO NON-USER: CPT | Performed by: FAMILY MEDICINE

## 2019-03-13 PROCEDURE — 1160F RVW MEDS BY RX/DR IN RCRD: CPT | Performed by: FAMILY MEDICINE

## 2019-03-13 PROCEDURE — 3078F DIAST BP <80 MM HG: CPT | Performed by: FAMILY MEDICINE

## 2019-03-13 PROCEDURE — 99214 OFFICE O/P EST MOD 30 MIN: CPT | Performed by: FAMILY MEDICINE

## 2019-03-13 PROCEDURE — G0439 PPPS, SUBSEQ VISIT: HCPCS | Performed by: FAMILY MEDICINE

## 2019-03-13 NOTE — PROGRESS NOTES
Assessment/Plan:    No problem-specific Assessment & Plan notes found for this encounter  Might consider stopping bp meds in future due to his successful wt loss, if hisSBP<110  Obesity improving  CATHY hx, not on cpap, might have improved w/ wt loss  Prediabetes, continued diet/wt loss efforts aware, f/u q6m  BMI Counseling: Body mass index is 34 23 kg/m²  Discussed the patient's BMI with him  The BMI is above average  BMI counseling and education was provided to the patient  Nutrition recommendations include decreasing overall calorie intake  Diagnoses and all orders for this visit:    Benign essential HTN    Obesity (BMI 30-39  9)    Obstructive sleep apnea    Prediabetes  -     Hemoglobin A1C; Future    Screening for cardiovascular, respiratory, and genitourinary diseases  -     Lipid Panel with Direct LDL reflex; Future    Screening for diabetes mellitus (DM)  -     Comprehensive metabolic panel; Future    Medicare annual wellness visit, subsequent    Osteoarthritis, unspecified osteoarthritis type, unspecified site              No follow-ups on file  Subjective:      Patient ID: Royal Colorado is a 68 y o  male  Chief Complaint   Patient presents with    Follow-up     jmcma  Medicare Wellness Visit       HPI  Going to PT  Lost wt with diet  Lost about 50#  Supplements, non rx  Counting calories  Knees better after knee surgery  bp better  No cough  Uses mobic prn for arthritis    The following portions of the patient's history were reviewed and updated as appropriate: allergies, current medications, past family history, past medical history, past social history, past surgical history and problem list     Review of Systems   Respiratory: Negative for shortness of breath  Cardiovascular: Negative for chest pain  Gastrointestinal: Negative for abdominal pain and bowel incontinence  Psychiatric/Behavioral: The patient is not nervous/anxious            Current Outpatient Medications Medication Sig Dispense Refill    Coenzyme Q10 (COQ-10) 100 MG CAPS Take by mouth daily      Ginkgo Biloba 40 MG TABS Take 1 tablet by mouth daily      meloxicam (MOBIC) 15 mg tablet TAKE 1 TABLET BY MOUTH EVERY DAY AS NEEDED 90 tablet 0    Multiple Vitamins-Minerals (DAILY MULTIVITAMIN PO) Take 1 capsule by mouth daily      Probiotic Product (PROBIOTIC-10 PO) Take 1 capsule by mouth daily      ramipril (ALTACE) 5 mg capsule Take 1 capsule (5 mg total) by mouth daily 90 capsule 1     No current facility-administered medications for this visit  Objective:    /70   Pulse 72   Temp 98 9 °F (37 2 °C)   Resp 16   Ht 5' 9" (1 753 m)   Wt 105 kg (231 lb 12 8 oz)   BMI 34 23 kg/m²        Physical Exam   Constitutional: He appears well-developed  No distress  HENT:   Head: Normocephalic  Eyes: Conjunctivae are normal  No scleral icterus  Neck: Neck supple  Cardiovascular: Normal rate, regular rhythm, normal heart sounds and intact distal pulses  Exam reveals no friction rub  No murmur heard  Pulmonary/Chest: Effort normal  No respiratory distress  He has no wheezes  Abdominal: Soft  There is no tenderness  Musculoskeletal: He exhibits no edema or deformity  Neurological: He is alert  He exhibits normal muscle tone  Skin: Skin is warm and dry  No rash noted  No pallor  Psychiatric: His behavior is normal  Thought content normal    Nursing note and vitals reviewed  Garry Duane, DO  Assessment and Plan:    Problem List Items Addressed This Visit        Active Problems    Benign essential HTN - Primary    Medicare annual wellness visit, subsequent    Obesity (BMI 30-39  9)    Obstructive sleep apnea    Osteoarthrosis    Prediabetes    Relevant Orders    Hemoglobin A1C    Screening for cardiovascular, respiratory, and genitourinary diseases    Relevant Orders    Lipid Panel with Direct LDL reflex    Screening for diabetes mellitus (DM)    Relevant Orders Comprehensive metabolic panel        Health Maintenance Due   Topic Date Due    Hepatitis C Screening  1945    BMI: Followup Plan  12/09/1963    DTaP,Tdap,and Td Vaccines (1 - Tdap) 12/09/1966    Pneumococcal PPSV23/PCV13 65+ Years / Low and Medium Risk (1 of 2 - PCV13) 12/09/2010    INFLUENZA VACCINE  07/01/2018         HPI:  Patrick Lorenzo is a 68 y o  male here for his Subsequent Wellness Visit  Patient Active Problem List   Diagnosis    Arthralgia of right knee    Benign essential HTN    Benign prostatic hyperplasia without urinary obstruction    Obstructive sleep apnea    Old peripheral tear of medial meniscus of right knee    Osteoarthrosis    Prediabetes    Primary localized osteoarthritis of right knee    Restrictive lung disease    Umbilical hernia    Ventral hernia    Healthcare maintenance    Pre-op examination    Obesity (BMI 30-39  9)    Screening for cardiovascular, respiratory, and genitourinary diseases    Screening for diabetes mellitus (DM)    Medicare annual wellness visit, subsequent     Past Medical History:   Diagnosis Date    Arthritis     BPH (benign prostatic hypertrophy)     Hx post laser TURP    GERD (gastroesophageal reflux disease)     H/O exercise stress test     Hyperlipidemia     Hypertension     Morbid obesity ([address] )     Last Assessed:12/21/2016 ;     Plantar fascial fibromatosis     Onset:1/23/2012    Sleep apnea     does not wear CPAP     Past Surgical History:   Procedure Laterality Date    COLONOSCOPY      COLONOSCOPY N/A 2/22/2017    Procedure: COLONOSCOPY;  Surgeon: Wilfredo Norton MD;  [facility]; Service:     ESOPHAGOGASTRODUODENOSCOPY N/A 2/22/2017    Procedure: ESOPHAGOGASTRODUODENOSCOPY (EGD); Surgeon: Wilfredo Norton MD;  Location: [facility] GI LAB;   Service:     HERNIA REPAIR  [id number]    umbilical    TONSILLECTOMY      TRANSURETHRAL RESECTION OF PROSTATE       Family History   Problem Relation Age of Onset    Diabetes Mother     Arthritis Mother     Hypertension Mother     Liver disease Mother     Liver disease Father      Social History     Tobacco Use   Smoking Status Never Smoker   Smokeless Tobacco Never Used     Social History     Substance and Sexual Activity   Alcohol Use Yes    Comment: rarely      Social History     Substance and Sexual Activity   Drug Use No       Current Outpatient Medications   Medication Sig Dispense Refill    Coenzyme Q10 (COQ-10) 100 MG CAPS Take by mouth daily      Ginkgo Biloba 40 MG TABS Take 1 tablet by mouth daily      meloxicam (MOBIC) 15 mg tablet TAKE 1 TABLET BY MOUTH EVERY DAY AS NEEDED 90 tablet 0    Multiple Vitamins-Minerals (DAILY MULTIVITAMIN PO) Take 1 capsule by mouth daily      Probiotic Product (PROBIOTIC-10 PO) Take 1 capsule by mouth daily      ramipril (ALTACE) 5 mg capsule Take 1 capsule (5 mg total) by mouth daily 90 capsule 1     No current facility-administered medications for this visit  Allergies   Allergen Reactions    Sulfa Antibiotics Hives     Reaction Date: [id number]; There is no immunization history for the selected administration types on file for this patient  Patient Care Team:  Juan Torres DO as PCP - Prasanth Kendall MD    Medicare Screening Tests and Risk Assessments:  Sreedhar Mcarthur is here for his Subsequent Wellness visit  Last Medicare Wellness visit information reviewed, patient interviewed and updates made to the record today  Health Risk Assessment:  Patient rates overall health as very good  Patient feels that their physical health rating is Much better  Eyesight was rated as Same  Patient feels that their emotional and mental health rating is Slightly better  Pain experienced by patient in the last 7 days has been Some  Patient's pain rating has been 1/10  Patient states that he has experienced no weight loss or gain in last 6 months  Emotional/Mental Health:  Patient has been feeling nervous/anxious      PHQ-9 Depression Screening:    Frequency of the following problems over the past two weeks:      1  Little interest or pleasure in doing things: 0 - not at all      2  Feeling down, depressed, or hopeless: 0 - not at all  PHQ-2 Score: 0          Broken Bones/Falls: Fall Risk Assessment:    In the past year, patient has experienced: No history of falling in past year          Bladder/Bowel:  Patient has not leaked urine accidently in the last six months  Patient reports no loss of bowel control  Immunizations:  Patient has not had a flu vaccination within the last year  Patient has not received a pneumonia shot  Patient has not received a shingles shot  Patient has not received tetanus/diphtheria shot  Home Safety:  Patient does not have trouble with stairs inside or outside of their home  Patient currently reports that there are no safety hazards present in home, working smoke alarms, working carbon monoxide detectors  Preventative Screenings:   prostate cancer screen performed, colon cancer screen completed, cholesterol screen completed, no glaucoma eye exam completed    Nutrition:  Current diet: Low Saturated Fat and Low Carb with servings of the following:    Medications:  Patient is currently taking over-the-counter supplements  List of OTC medications includes: weight loss vitamins  Patient is able to manage medications  Lifestyle Choices:  Patient reports no tobacco use  Patient has not smoked or used tobacco in the past   Patient reports no alcohol use  Patient drives a vehicle  Patient wears seat belt  Current level of exercise of physical activity described by patient as: gym          Activities of Daily Living:  Can get out of bed by his or her self, able to dress self, able to make own meals, able to do own shopping, able to bathe self, can do own laundry/housekeeping, can manage own money, pay bills and track expenses    Previous Hospitalizations:  No hospitalization or ED visit in past 12 months        Advanced Directives:  Patient has not decided on power of   Patient has not completed advanced directive  Preventative Screening/Counseling:      Cardiovascular:      General: Risks and Benefits Discussed          Diabetes:      General: Screening Current          Colorectal Cancer:      General: Risks and Benefits Discussed          Prostate Cancer:      General: Risks and Benefits Discussed          Osteoporosis:      General: Screening Not Indicated          AAA:      General: Screening Not Indicated          Glaucoma:      General: Risks and Benefits Discussed          HIV:      General: Screening Not Indicated          Hepatitis C:      General: Risks and Benefits Discussed        Advanced Directives:   Patient has no living will for healthcare, No end of life assessment reviewed with patient

## 2019-03-13 NOTE — PATIENT INSTRUCTIONS
SHINGRIX is the new, more effective vaccine for Shingles  It is more than 90% effective  You should check with your local pharmacy and insurance company for availability and coverage  It is a 2 shot series, with the second shot given between 2-6 months after the first, and is approved for ages 48 and up  Please find out if your insurance company would cover a tetanus shot ("Adacel") so we can give you one in the future if you want

## 2019-03-19 ENCOUNTER — OFFICE VISIT (OUTPATIENT)
Dept: GASTROENTEROLOGY | Facility: CLINIC | Age: 74
End: 2019-03-19
Payer: COMMERCIAL

## 2019-03-19 VITALS
WEIGHT: 228.6 LBS | DIASTOLIC BLOOD PRESSURE: 79 MMHG | HEART RATE: 64 BPM | TEMPERATURE: 98.6 F | BODY MASS INDEX: 33.86 KG/M2 | HEIGHT: 69 IN | SYSTOLIC BLOOD PRESSURE: 123 MMHG

## 2019-03-19 DIAGNOSIS — Z86.010 HISTORY OF COLON POLYPS: Primary | ICD-10-CM

## 2019-03-19 PROBLEM — Z86.0100 HISTORY OF COLON POLYPS: Status: ACTIVE | Noted: 2019-03-19

## 2019-03-19 PROCEDURE — 99213 OFFICE O/P EST LOW 20 MIN: CPT | Performed by: INTERNAL MEDICINE

## 2019-03-19 NOTE — PROGRESS NOTES
Follow-up Note -  [facility]  Gina Dawson 1945 male         Reason:  Follow-up    HPI:  Mr Sreedhar Mcarthur came in for follow-up  He had EGD and colonoscopies couple of years ago and had colon polyps removed  His advised to have repeat colonoscopy in 3 years but he thought he is due now  Patient has regular bowel movements and denies any blood or mucus in the stool  Appetite is good and denies any recent weight loss  Denies any abdominal pain, nausea, or vomiting  Has no heartburn or acid reflux  Denies any difficulty swallowing  He is complaining about a small hernia in the upper abdomen but denies any symptoms  REVIEW OF SYSTEMS: Review of Systems   Constitutional: Negative for activity change, appetite change, chills, diaphoresis, fatigue, fever and unexpected weight change  HENT: Negative for ear discharge, ear pain, facial swelling, hearing loss, nosebleeds, sore throat, tinnitus and voice change  Eyes: Negative for pain, discharge, redness, itching and visual disturbance  Respiratory: Negative for apnea, cough, chest tightness, shortness of breath and wheezing  Cardiovascular: Negative for chest pain and palpitations  Gastrointestinal:        As noted in HPI   Endocrine: Negative for cold intolerance, heat intolerance and polyuria  Genitourinary: Negative for difficulty urinating, dysuria, flank pain, hematuria and urgency  Musculoskeletal: Negative for arthralgias, back pain, gait problem, joint swelling and myalgias  Skin: Negative for rash and wound  Neurological: Negative for dizziness, tremors, seizures, speech difficulty, light-headedness, numbness and headaches  Hematological: Negative for adenopathy  Does not bruise/bleed easily  Psychiatric/Behavioral: Negative for agitation, behavioral problems and confusion  The patient is not nervous/anxious           Past Medical History:   Diagnosis Date    Arthritis     BPH (benign prostatic hypertrophy)     Hx post laser TURP    GERD (gastroesophageal reflux disease)     H/O exercise stress test     Hyperlipidemia     Hypertension     Morbid obesity ([address] )     Last Assessed:12/21/2016 ;     Plantar fascial fibromatosis     Onset:1/23/2012    Sleep apnea     does not wear CPAP      Past Surgical History:   Procedure Laterality Date    COLONOSCOPY      COLONOSCOPY N/A 2/22/2017    Procedure: COLONOSCOPY;  Surgeon: Panda Jonas MD;  Location: [facility] GI LAB; Service:     ESOPHAGOGASTRODUODENOSCOPY N/A 2/22/2017    Procedure: ESOPHAGOGASTRODUODENOSCOPY (EGD); Surgeon: Panda Jonas MD;  Location: [facility] GI LAB;   Service:     HERNIA REPAIR  3290    umbilical    TONSILLECTOMY      TRANSURETHRAL RESECTION OF PROSTATE       Social History     Socioeconomic History    Marital status: Single     Spouse name: Not on file    Number of children: Not on file    Years of education: Not on file    Highest education level: Not on file   Occupational History    Not on file   Social Needs    Financial resource strain: Not on file    Food insecurity:     Worry: Not on file     Inability: Not on file    Transportation needs:     Medical: Not on file     Non-medical: Not on file   Tobacco Use    Smoking status: Never Smoker    Smokeless tobacco: Never Used   Substance and Sexual Activity    Alcohol use: Yes     Comment: rarely    Drug use: No    Sexual activity: Not on file   Lifestyle    Physical activity:     Days per week: Not on file     Minutes per session: Not on file    Stress: Not on file   Relationships    Social connections:     Talks on phone: Not on file     Gets together: Not on file     Attends Zoroastrian service: Not on file     Active member of club or organization: Not on file     Attends meetings of clubs or organizations: Not on file     Relationship status: Not on file    Intimate partner violence:     Fear of current or ex partner: Not on file     Emotionally abused: Not on file     Physically abused: Not on file     Forced sexual activity: Not on file   Other Topics Concern    Not on file   Social History Narrative    Not on file     Family History   Problem Relation Age of Onset    Diabetes Mother     Arthritis Mother     Hypertension Mother     Liver disease Mother     Liver disease Father      Sulfa antibiotics  Current Outpatient Medications   Medication Sig Dispense Refill    Coenzyme Q10 (COQ-10) 100 MG CAPS Take by mouth daily      Ginkgo Biloba 40 MG TABS Take 1 tablet by mouth daily      meloxicam (MOBIC) 15 mg tablet TAKE 1 TABLET BY MOUTH EVERY DAY AS NEEDED 90 tablet 0    Multiple Vitamins-Minerals (DAILY MULTIVITAMIN PO) Take 1 capsule by mouth daily      Probiotic Product (PROBIOTIC-10 PO) Take 1 capsule by mouth daily      ramipril (ALTACE) 5 mg capsule Take 1 capsule (5 mg total) by mouth daily 90 capsule 1     No current facility-administered medications for this visit  Blood pressure 123/79, pulse 64, temperature 98 6 °F (37 °C), temperature source Tympanic, height 5' 9" (1 753 m), weight 104 kg (228 lb 9 6 oz)  PHYSICAL EXAM: Physical Exam   Constitutional: He is oriented to person, place, and time  He appears well-developed  HENT:   Head: Normocephalic and atraumatic  Mouth/Throat: Oropharynx is clear and moist    Eyes: Pupils are equal, round, and reactive to light  Conjunctivae are normal  Right eye exhibits no discharge  Left eye exhibits no discharge  No scleral icterus  Neck: Neck supple  No JVD present  No tracheal deviation present  No thyromegaly present  Cardiovascular: Normal rate, regular rhythm, normal heart sounds and intact distal pulses  Exam reveals no gallop and no friction rub  No murmur heard  Pulmonary/Chest: Effort normal and breath sounds normal  No respiratory distress  He has no wheezes  He has no rales  He exhibits no tenderness  Abdominal: Soft   Bowel sounds are normal  He exhibits no distension and no mass  There is no tenderness  There is no rebound and no guarding  A hernia (Ventral hernia) is present  Musculoskeletal: He exhibits no edema  Lymphadenopathy:     He has no cervical adenopathy  Neurological: He is alert and oriented to person, place, and time  Skin: Skin is warm and dry  No rash noted  No erythema  Psychiatric: He has a normal mood and affect  His behavior is normal  Thought content normal         No results found for: WBC, HGB, HCT, MCV, PLT  Lab Results   Component Value Date    CALCIUM 9 2 07/07/2018     01/13/2018    K 4 4 07/07/2018    CO2 25 07/07/2018     07/07/2018    BUN 16 07/07/2018    CREATININE 1 11 01/13/2018     Lab Results   Component Value Date    ALT 15 10/20/2018    AST 16 10/20/2018    ALKPHOS 78 07/07/2018    BILITOT 0 7 01/13/2018     No results found for: INR, PROTIME    Xr Chest 2 Views    Result Date: 3/30/2018  Impression: No acute cardiopulmonary disease  No pneumothorax  Workstation performed: [id number]     Xr Ribs 2 Views Left    Result Date: 3/30/2018  Impression: Minimally displaced fracture in the anterior aspect of the left 7th rib  Workstation performed: [id number]       ASSESSMENT & PLAN:    History of colon polyps  Personal history of colon polyps- patient is at increased risk for colon cancer screening  Rule out colorectal lesions including polyps or malignancy      -high-fiber diet    -due for repeat colonoscopy in February 2020

## 2019-03-19 NOTE — ASSESSMENT & PLAN NOTE
Personal history of colon polyps- patient is at increased risk for colon cancer screening  Rule out colorectal lesions including polyps or malignancy      -high-fiber diet    -due for repeat colonoscopy in February 2020

## 2019-03-24 LAB
ALBUMIN SERPL-MCNC: 4.2 G/DL (ref 3.5–4.8)
ALBUMIN/GLOB SERPL: 1.6 {RATIO} (ref 1.2–2.2)
ALP SERPL-CCNC: 73 IU/L (ref 39–117)
ALT SERPL-CCNC: 16 IU/L (ref 0–44)
AST SERPL-CCNC: 17 IU/L (ref 0–40)
BILIRUB SERPL-MCNC: 0.5 MG/DL (ref 0–1.2)
BUN SERPL-MCNC: 13 MG/DL (ref 8–27)
BUN/CREAT SERPL: 12 (ref 10–24)
CALCIUM SERPL-MCNC: 9.5 MG/DL (ref 8.6–10.2)
CHLORIDE SERPL-SCNC: 102 MMOL/L (ref 96–106)
CHOLEST SERPL-MCNC: 118 MG/DL (ref 100–199)
CO2 SERPL-SCNC: 23 MMOL/L (ref 20–29)
CREAT SERPL-MCNC: 1.08 MG/DL (ref 0.76–1.27)
GLOBULIN SER-MCNC: 2.6 G/DL (ref 1.5–4.5)
GLUCOSE SERPL-MCNC: 95 MG/DL (ref 65–99)
HBA1C MFR BLD: 5.6 % (ref 4.8–5.6)
HDLC SERPL-MCNC: 35 MG/DL
LABCORP COMMENT: NORMAL
LDLC SERPL CALC-MCNC: 68 MG/DL (ref 0–99)
MICRODELETION SYND BLD/T FISH: NORMAL
POTASSIUM SERPL-SCNC: 4.5 MMOL/L (ref 3.5–5.2)
PROT SERPL-MCNC: 6.8 G/DL (ref 6–8.5)
SL AMB EGFR AFRICAN AMERICAN: 78 ML/MIN/1.73
SL AMB EGFR NON AFRICAN AMERICAN: 68 ML/MIN/1.73
SODIUM SERPL-SCNC: 142 MMOL/L (ref 134–144)
TRIGL SERPL-MCNC: 75 MG/DL (ref 0–149)

## 2019-04-22 RX ORDER — PENICILLIN G SODIUM 5000000 [USP'U]/1
INJECTION, POWDER, FOR SOLUTION INTRAMUSCULAR; INTRAVENOUS
COMMUNITY
End: 2019-09-09 | Stop reason: ALTCHOICE

## 2019-04-23 ENCOUNTER — OFFICE VISIT (OUTPATIENT)
Dept: FAMILY MEDICINE CLINIC | Facility: CLINIC | Age: 74
End: 2019-04-23
Payer: COMMERCIAL

## 2019-04-23 VITALS
TEMPERATURE: 98.7 F | WEIGHT: 219 LBS | HEIGHT: 69 IN | RESPIRATION RATE: 20 BRPM | HEART RATE: 78 BPM | DIASTOLIC BLOOD PRESSURE: 84 MMHG | BODY MASS INDEX: 32.44 KG/M2 | SYSTOLIC BLOOD PRESSURE: 136 MMHG

## 2019-04-23 DIAGNOSIS — I71.4 ABDOMINAL AORTIC ANEURYSM (AAA) WITHOUT RUPTURE (HCC): ICD-10-CM

## 2019-04-23 DIAGNOSIS — S20.211D CONTUSION OF RIGHT CHEST WALL, SUBSEQUENT ENCOUNTER: ICD-10-CM
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
## 2019-04-23 PROBLEM — I71.40 ABDOMINAL AORTIC ANEURYSM (AAA) WITHOUT RUPTURE: Status: ACTIVE | Noted: 2019-04-23

## 2019-04-23 PROBLEM — S20.211A CONTUSION OF RIGHT CHEST WALL: Status: ACTIVE | Noted: 2019-04-23

## 2019-04-23 PROCEDURE — 99214 OFFICE O/P EST MOD 30 MIN: CPT | Performed by: FAMILY MEDICINE

## 2019-05-20 DIAGNOSIS — M25.561 ARTHRALGIA OF RIGHT KNEE: ICD-10-CM

## 2019-05-20 RX ORDER — MELOXICAM 15 MG/1
TABLET ORAL
Qty: 90 TABLET | Refills: 1 | Status: SHIPPED | OUTPATIENT
Start: 2019-05-20 | End: 2019-06-17 | Stop reason: HOSPADM

## 2019-05-21 ENCOUNTER — OFFICE VISIT (OUTPATIENT)
Dept: FAMILY MEDICINE CLINIC | Facility: CLINIC | Age: 74
End: 2019-05-21
Payer: COMMERCIAL

## 2019-05-21 ENCOUNTER — APPOINTMENT (OUTPATIENT)
Dept: RADIOLOGY | Facility: CLINIC | Age: 74
End: 2019-05-21
Payer: COMMERCIAL

## 2019-05-21 VITALS
HEART RATE: 80 BPM | TEMPERATURE: 98.7 F | SYSTOLIC BLOOD PRESSURE: 132 MMHG | DIASTOLIC BLOOD PRESSURE: 70 MMHG | HEIGHT: 69 IN | WEIGHT: 216 LBS | RESPIRATION RATE: 16 BRPM | BODY MASS INDEX: 31.99 KG/M2

## 2019-05-21 DIAGNOSIS — M54.6 THORACIC SPINE PAIN: Primary | ICD-10-CM

## 2019-05-21 DIAGNOSIS — M54.6 THORACIC SPINE PAIN: ICD-10-CM

## 2019-05-21 PROCEDURE — 72072 X-RAY EXAM THORAC SPINE 3VWS: CPT

## 2019-05-21 PROCEDURE — 99213 OFFICE O/P EST LOW 20 MIN: CPT | Performed by: FAMILY MEDICINE

## 2019-05-21 RX ORDER — CYCLOBENZAPRINE HCL 10 MG
10 TABLET ORAL
Qty: 21 TABLET | Refills: 0 | Status: SHIPPED | OUTPATIENT
Start: 2019-05-21 | End: 2019-09-09 | Stop reason: ALTCHOICE

## 2019-05-21 RX ORDER — PREDNISONE 20 MG/1
TABLET ORAL
Qty: 32 TABLET | Refills: 0 | Status: SHIPPED | OUTPATIENT
Start: 2019-05-21 | End: 2019-09-09 | Stop reason: ALTCHOICE

## 2019-05-24 ENCOUNTER — TELEPHONE (OUTPATIENT)
Dept: FAMILY MEDICINE CLINIC | Facility: CLINIC | Age: 74
End: 2019-05-24

## 2019-05-29 ENCOUNTER — TELEPHONE (OUTPATIENT)
Dept: OBGYN CLINIC | Facility: CLINIC | Age: 74
End: 2019-05-29

## 2019-05-29 ENCOUNTER — OFFICE VISIT (OUTPATIENT)
Dept: OBGYN CLINIC | Facility: CLINIC | Age: 74
End: 2019-05-29
Payer: COMMERCIAL

## 2019-05-29 VITALS
HEART RATE: 83 BPM | SYSTOLIC BLOOD PRESSURE: 147 MMHG | WEIGHT: 209 LBS | BODY MASS INDEX: 30.96 KG/M2 | DIASTOLIC BLOOD PRESSURE: 82 MMHG | HEIGHT: 69 IN

## 2019-05-29 DIAGNOSIS — M47.814 SPONDYLOSIS OF THORACIC REGION WITHOUT MYELOPATHY OR RADICULOPATHY: ICD-10-CM

## 2019-05-29 DIAGNOSIS — M62.830 SPASM OF THORACIC BACK MUSCLE: Primary | ICD-10-CM

## 2019-05-29 PROCEDURE — 99213 OFFICE O/P EST LOW 20 MIN: CPT | Performed by: ORTHOPAEDIC SURGERY

## 2019-05-30 ENCOUNTER — EVALUATION (OUTPATIENT)
Dept: PHYSICAL THERAPY | Facility: CLINIC | Age: 74
End: 2019-05-30
Payer: COMMERCIAL

## 2019-05-30 DIAGNOSIS — M47.814 SPONDYLOSIS OF THORACIC REGION WITHOUT MYELOPATHY OR RADICULOPATHY: ICD-10-CM

## 2019-05-30 DIAGNOSIS — M62.830 SPASM OF THORACIC BACK MUSCLE: ICD-10-CM

## 2019-05-30 PROCEDURE — 97140 MANUAL THERAPY 1/> REGIONS: CPT

## 2019-05-30 PROCEDURE — 97110 THERAPEUTIC EXERCISES: CPT

## 2019-05-30 PROCEDURE — 97161 PT EVAL LOW COMPLEX 20 MIN: CPT

## 2019-06-03 ENCOUNTER — OFFICE VISIT (OUTPATIENT)
Dept: PHYSICAL THERAPY | Facility: CLINIC | Age: 74
End: 2019-06-03
Payer: COMMERCIAL

## 2019-06-03 DIAGNOSIS — M47.814 SPONDYLOSIS OF THORACIC REGION WITHOUT MYELOPATHY OR RADICULOPATHY: Primary | ICD-10-CM

## 2019-06-03 DIAGNOSIS — M62.830 SPASM OF THORACIC BACK MUSCLE: ICD-10-CM

## 2019-06-03 PROCEDURE — 97140 MANUAL THERAPY 1/> REGIONS: CPT

## 2019-06-03 PROCEDURE — 97112 NEUROMUSCULAR REEDUCATION: CPT

## 2019-06-03 PROCEDURE — 97110 THERAPEUTIC EXERCISES: CPT

## 2019-06-05 ENCOUNTER — OFFICE VISIT (OUTPATIENT)
Dept: PHYSICAL THERAPY | Facility: CLINIC | Age: 74
End: 2019-06-05
Payer: COMMERCIAL

## 2019-06-05 DIAGNOSIS — M62.830 SPASM OF THORACIC BACK MUSCLE: ICD-10-CM

## 2019-06-05 DIAGNOSIS — M47.814 SPONDYLOSIS OF THORACIC REGION WITHOUT MYELOPATHY OR RADICULOPATHY: Primary | ICD-10-CM

## 2019-06-05 PROCEDURE — 97140 MANUAL THERAPY 1/> REGIONS: CPT

## 2019-06-05 PROCEDURE — 97112 NEUROMUSCULAR REEDUCATION: CPT

## 2019-06-05 PROCEDURE — 97110 THERAPEUTIC EXERCISES: CPT

## 2019-06-11 ENCOUNTER — OFFICE VISIT (OUTPATIENT)
Dept: PHYSICAL THERAPY | Facility: CLINIC | Age: 74
End: 2019-06-11
Payer: COMMERCIAL

## 2019-06-11 DIAGNOSIS — M47.814 SPONDYLOSIS OF THORACIC REGION WITHOUT MYELOPATHY OR RADICULOPATHY: Primary | ICD-10-CM

## 2019-06-11 DIAGNOSIS — M62.830 SPASM OF THORACIC BACK MUSCLE: ICD-10-CM

## 2019-06-11 PROCEDURE — 97112 NEUROMUSCULAR REEDUCATION: CPT

## 2019-06-11 PROCEDURE — 97140 MANUAL THERAPY 1/> REGIONS: CPT

## 2019-06-11 PROCEDURE — 97110 THERAPEUTIC EXERCISES: CPT

## 2019-06-13 ENCOUNTER — OFFICE VISIT (OUTPATIENT)
Dept: PHYSICAL THERAPY | Facility: CLINIC | Age: 74
End: 2019-06-13
Payer: COMMERCIAL

## 2019-06-13 DIAGNOSIS — M62.830 SPASM OF THORACIC BACK MUSCLE: ICD-10-CM

## 2019-06-13 DIAGNOSIS — M47.814 SPONDYLOSIS OF THORACIC REGION WITHOUT MYELOPATHY OR RADICULOPATHY: Primary | ICD-10-CM

## 2019-06-13 PROCEDURE — 97110 THERAPEUTIC EXERCISES: CPT

## 2019-06-13 PROCEDURE — 97112 NEUROMUSCULAR REEDUCATION: CPT

## 2019-06-13 PROCEDURE — 97140 MANUAL THERAPY 1/> REGIONS: CPT

## 2019-06-17 ENCOUNTER — OFFICE VISIT (OUTPATIENT)
Dept: OBGYN CLINIC | Facility: CLINIC | Age: 74
End: 2019-06-17
Payer: COMMERCIAL

## 2019-06-17 ENCOUNTER — OFFICE VISIT (OUTPATIENT)
Dept: PHYSICAL THERAPY | Facility: CLINIC | Age: 74
End: 2019-06-17
Payer: COMMERCIAL

## 2019-06-17 VITALS
SYSTOLIC BLOOD PRESSURE: 130 MMHG | HEIGHT: 69 IN | WEIGHT: 211 LBS | HEART RATE: 87 BPM | DIASTOLIC BLOOD PRESSURE: 80 MMHG | BODY MASS INDEX: 31.25 KG/M2

## 2019-06-17 DIAGNOSIS — R07.81 RIB PAIN ON LEFT SIDE: ICD-10-CM

## 2019-06-17 DIAGNOSIS — M62.830 SPASM OF THORACIC BACK MUSCLE: Primary | ICD-10-CM

## 2019-06-17 DIAGNOSIS — M62.830 SPASM OF THORACIC BACK MUSCLE: ICD-10-CM

## 2019-06-17 DIAGNOSIS — M47.814 SPONDYLOSIS OF THORACIC REGION WITHOUT MYELOPATHY OR RADICULOPATHY: Primary | ICD-10-CM

## 2019-06-17 PROCEDURE — 97110 THERAPEUTIC EXERCISES: CPT

## 2019-06-17 PROCEDURE — 99213 OFFICE O/P EST LOW 20 MIN: CPT | Performed by: ORTHOPAEDIC SURGERY

## 2019-06-17 PROCEDURE — 97112 NEUROMUSCULAR REEDUCATION: CPT

## 2019-06-17 PROCEDURE — 97140 MANUAL THERAPY 1/> REGIONS: CPT

## 2019-06-17 RX ORDER — NAPROXEN 500 MG/1
500 TABLET ORAL 2 TIMES DAILY WITH MEALS
Qty: 60 TABLET | Refills: 0 | Status: SHIPPED | OUTPATIENT
Start: 2019-06-17 | End: 2020-02-10

## 2019-06-19 ENCOUNTER — OFFICE VISIT (OUTPATIENT)
Dept: PHYSICAL THERAPY | Facility: CLINIC | Age: 74
End: 2019-06-19
Payer: COMMERCIAL

## 2019-06-19 DIAGNOSIS — M62.830 SPASM OF THORACIC BACK MUSCLE: ICD-10-CM

## 2019-06-19 DIAGNOSIS — M47.814 SPONDYLOSIS OF THORACIC REGION WITHOUT MYELOPATHY OR RADICULOPATHY: Primary | ICD-10-CM

## 2019-06-19 PROCEDURE — 97140 MANUAL THERAPY 1/> REGIONS: CPT

## 2019-06-19 PROCEDURE — 97110 THERAPEUTIC EXERCISES: CPT

## 2019-06-19 PROCEDURE — 97112 NEUROMUSCULAR REEDUCATION: CPT

## 2019-06-25 ENCOUNTER — OFFICE VISIT (OUTPATIENT)
Dept: PHYSICAL THERAPY | Facility: CLINIC | Age: 74
End: 2019-06-25
Payer: COMMERCIAL

## 2019-06-25 DIAGNOSIS — M62.830 SPASM OF THORACIC BACK MUSCLE: ICD-10-CM

## 2019-06-25 DIAGNOSIS — M47.814 SPONDYLOSIS OF THORACIC REGION WITHOUT MYELOPATHY OR RADICULOPATHY: Primary | ICD-10-CM

## 2019-06-25 PROCEDURE — 97110 THERAPEUTIC EXERCISES: CPT

## 2019-06-25 PROCEDURE — 97140 MANUAL THERAPY 1/> REGIONS: CPT

## 2019-06-25 PROCEDURE — 97112 NEUROMUSCULAR REEDUCATION: CPT

## 2019-06-27 ENCOUNTER — OFFICE VISIT (OUTPATIENT)
Dept: PHYSICAL THERAPY | Facility: CLINIC | Age: 74
End: 2019-06-27
Payer: COMMERCIAL

## 2019-06-27 DIAGNOSIS — M47.814 SPONDYLOSIS OF THORACIC REGION WITHOUT MYELOPATHY OR RADICULOPATHY: Primary | ICD-10-CM

## 2019-06-27 DIAGNOSIS — M62.830 SPASM OF THORACIC BACK MUSCLE: ICD-10-CM

## 2019-06-27 PROCEDURE — 97110 THERAPEUTIC EXERCISES: CPT

## 2019-06-27 PROCEDURE — 97140 MANUAL THERAPY 1/> REGIONS: CPT

## 2019-06-27 PROCEDURE — 97112 NEUROMUSCULAR REEDUCATION: CPT

## 2019-07-01 ENCOUNTER — OFFICE VISIT (OUTPATIENT)
Dept: PHYSICAL THERAPY | Facility: CLINIC | Age: 74
End: 2019-07-01
Payer: COMMERCIAL

## 2019-07-01 ENCOUNTER — OFFICE VISIT (OUTPATIENT)
Dept: OBGYN CLINIC | Facility: CLINIC | Age: 74
End: 2019-07-01
Payer: COMMERCIAL

## 2019-07-01 VITALS
SYSTOLIC BLOOD PRESSURE: 129 MMHG | HEART RATE: 79 BPM | DIASTOLIC BLOOD PRESSURE: 72 MMHG | HEIGHT: 69 IN | BODY MASS INDEX: 31.55 KG/M2 | WEIGHT: 213 LBS

## 2019-07-01 DIAGNOSIS — M47.814 SPONDYLOSIS OF THORACIC REGION WITHOUT MYELOPATHY OR RADICULOPATHY: Primary | ICD-10-CM

## 2019-07-01 DIAGNOSIS — M62.830 SPASM OF THORACIC BACK MUSCLE: ICD-10-CM

## 2019-07-01 DIAGNOSIS — R07.81 RIB PAIN ON RIGHT SIDE: ICD-10-CM

## 2019-07-01 PROCEDURE — 97110 THERAPEUTIC EXERCISES: CPT

## 2019-07-01 PROCEDURE — 99213 OFFICE O/P EST LOW 20 MIN: CPT | Performed by: ORTHOPAEDIC SURGERY

## 2019-07-01 PROCEDURE — 97140 MANUAL THERAPY 1/> REGIONS: CPT

## 2019-07-01 PROCEDURE — 97112 NEUROMUSCULAR REEDUCATION: CPT

## 2019-07-01 RX ORDER — LIDOCAINE 50 MG/G
1 PATCH TOPICAL DAILY
Qty: 30 PATCH | Refills: 0 | Status: SHIPPED | OUTPATIENT
Start: 2019-07-01 | End: 2019-12-24
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
## 2019-07-01 NOTE — PROGRESS NOTES
Daily Note      Today's date: 2019  Patient name: Brian Kuo  : 1945  MRN: [id number]  Referring provider: Jodi Art DO  Dx:   Encounter Diagnosis     ICD-10-CM    1  Spondylosis of thoracic region without myelopathy or radiculopathy M47 814    2  Spasm of thoracic back muscle M62 830        Subjective: Pt reports that he just came from an ortho visit and he will undergo MRI of chest  Pt states that symptoms are improving and is able to sleep on either side, but is unable to tolerate sleeping on his back  Pt states he is typically able to sleep ~3 consecutive hours per night, unless he does a lot of physical activity and is able to tolerate up to 6 hours  Objective: See treatment diary below    Assessment: Pt tolerated treatment well  Pt introduced to biceps curls without c/o exacerbation of symptoms  Pt had mild increased TTP in posterior ribs around ribs 8-10  Pt also had difficulty performing lat pull-down and was held from scaption due to increased shoulder pain today  Plan: Progress treatment as tolerated  Precautions: HTN, GERD    Daily Treatment Diary     Manual     STM B/L UT, LS, SCM, cervical paraspinals Performed Performed  (D/C SCM) Performed Performed --   STM thoracic paraspinals, rhomboids, MT, LT, B/L lats Performed Performed Performed Performed Performed   Manual resisted thoracic expansion    Performed Performed Performed                       Exercise Diary   7   Cervical flex/ext AROM 2x10 10x +L&R rotation  10x 10x ea  10x ea     B/L UT stretch -- -- HEP -- --   Cane flexion supine 2x10 2x10 2x10 2x10  1# 2x10  2#   Extension stretch over ball 3x30s 3x30s 3x30s 3x30s 3x30s   Cervical rotation AROM  10x -- -- --   Scaption  2x10  1#; 8x  0#; 8x --   Shoulder IR with TB   10x B/L  red 10x  red 20x  red   Biceps curl     3#  2x10   Neuro Re-ed        Scap retractions 2x10  seated HEP -- -- --   B/L ER iso 2x10  yellow 2x10  yellow 2x10  yellow 2x10  yellow 2x10  yellow   Cervical isos (4-way) 5"x10 5"x10 5"x10 5"x10 5" x 10   Rows Neda  8#  2x10 9#  2x10 10#  2x10 2x10  green 2x10  green   Pec doorway stretch 3x20s 3x30s 3x30s 3x30s --   Lat pull down 8#  2x10 10#  2x10 10#  2x10 12#  2x10 11#  2x10   Shoulder extension    8#  2x10 2x10  red 2x10  red   Deep breathing in supine   10x 10x 10x           Pt edu/HEP                Time 28' 35' 35' 35' 35'       Modalities        CP left thoracic

## 2019-07-03 ENCOUNTER — OFFICE VISIT (OUTPATIENT)
Dept: PHYSICAL THERAPY | Facility: CLINIC | Age: 74
End: 2019-07-03
Payer: COMMERCIAL

## 2019-07-03 DIAGNOSIS — M62.830 SPASM OF THORACIC BACK MUSCLE: ICD-10-CM

## 2019-07-03 DIAGNOSIS — M47.814 SPONDYLOSIS OF THORACIC REGION WITHOUT MYELOPATHY OR RADICULOPATHY: Primary | ICD-10-CM

## 2019-07-03 PROCEDURE — 97140 MANUAL THERAPY 1/> REGIONS: CPT

## 2019-07-03 PROCEDURE — 97112 NEUROMUSCULAR REEDUCATION: CPT

## 2019-07-03 PROCEDURE — 97110 THERAPEUTIC EXERCISES: CPT

## 2019-07-03 NOTE — PROGRESS NOTES
Daily Note      Today's date: 7/3/2019  Patient name: Florencio Cline  : 1945  MRN: [id number]  Referring provider: Munir Black DO  Dx:   Encounter Diagnosis     ICD-10-CM    1  Spondylosis of thoracic region without myelopathy or radiculopathy M47 814    2  Spasm of thoracic back muscle M62 830        Subjective: Pt reports that bending forward to tie his shoes still bothers his back  Pt states that he continues to have discomfort in the front of the ribs  Objective: See treatment diary below    Assessment: Pt tolerated treatment well  Pt introduced to seated thoracic extension without c/o exacerbation of symptoms  Pt presents with improved tissue tension of R thoracolumbar paraspinals  Plan: Progress treatment as tolerated  Precautions: HTN, GERD    Daily Treatment Diary     Manual  7/3       STM B/L UT, LS, SCM, cervical paraspinals --       STM thoracic paraspinals, rhomboids, MT, LT, B/L lats Performed       Manual resisted thoracic expansion  Performed                           Exercise Diary  7/3       Cervical AROM 10x ea         Cane flexion supine 2x10  2#       Scaption 2x10       Shoulder IR with TB 2x10   red       Seated thoracic ext 2x10       Biceps curl 3#  2x10       Neuro Re-ed        B/L ER iso 2x10  yellow       Cervical isos (4-way) HEP       Rows 2x10  green       Pec doorway stretch 3x30s       Lat pull down 11#  2x10       Shoulder extension  2x10  red       Deep breathing in supine 10x               Pt edu/HEP                Time 30'           Modalities

## 2019-07-09 ENCOUNTER — APPOINTMENT (OUTPATIENT)
Dept: PHYSICAL THERAPY | Facility: CLINIC | Age: 74
End: 2019-07-09
Payer: COMMERCIAL

## 2019-07-11 ENCOUNTER — OFFICE VISIT (OUTPATIENT)
Dept: PHYSICAL THERAPY | Facility: CLINIC | Age: 74
End: 2019-07-11
Payer: COMMERCIAL

## 2019-07-11 ENCOUNTER — TELEPHONE (OUTPATIENT)
Dept: OBGYN CLINIC | Facility: HOSPITAL | Age: 74
End: 2019-07-11

## 2019-07-11 DIAGNOSIS — M47.814 SPONDYLOSIS OF THORACIC REGION WITHOUT MYELOPATHY OR RADICULOPATHY: Primary | ICD-10-CM

## 2019-07-11 DIAGNOSIS — R07.81 RIB PAIN ON RIGHT SIDE: Primary | ICD-10-CM

## 2019-07-11 DIAGNOSIS — M62.830 SPASM OF THORACIC BACK MUSCLE: ICD-10-CM

## 2019-07-11 PROCEDURE — 97140 MANUAL THERAPY 1/> REGIONS: CPT

## 2019-07-11 PROCEDURE — 97112 NEUROMUSCULAR REEDUCATION: CPT

## 2019-07-11 PROCEDURE — 97110 THERAPEUTIC EXERCISES: CPT

## 2019-07-11 NOTE — TELEPHONE ENCOUNTER
Caller: Patient's     C/B # [id number]   Dr Junie Devine    Patient's  called in to ask for a new script with a better CPT code  Per the  the code is invalid  She is asking if we can call [employer] for clarification  Contact -  [address] for [employer] at   Claim Number is [id number]   Thanks

## 2019-07-11 NOTE — PROGRESS NOTES
Daily Note      Today's date: 2019  Patient name: Anum Peters  : 1945  MRN: [id number]  Referring provider: Pita Blackmon DO    Dx:   Encounter Diagnosis     ICD-10-CM    1  Spondylosis of thoracic region without myelopathy or radiculopathy M47 814    2  Spasm of thoracic back muscle M62 830        Subjective: Pt reports that he is feeling about the same  Pt states that he was seeking a more "into it" approach in order to treat his back pain because he is continuing to have pain  Pt states that he previously attended a different PT facility years ago for knee OA and felt that the PT at that facility was able to "do a lot" to make him feel "like he was 19 again" including hot pack treatments and hands-on treatment  Upon questioning, pt states that he will continue this course of physical therapy treatment under the current plan of care because there is "too much red tape" to switch facilities  Objective: See treatment diary below    Assessment: Pt tolerated treatment well  Pt presents with hypomobility throughout multiple segments in thoracic spine  Pt was educated on plan of care with evidence-based approach with focus on active movement and optimal posture  Pt was also educated about the nature of his current injury and how it differs from the course of recovery from chronic conditions such as OA  Pt continues to require demonstration and verbal/tactile cuing in order to perform theraband rows correctly  Pt performed standing cane flexion with compensatory movement of increasing lumbar lordosis/lumbar extension at end range, which decreased slightly following verbal cuing  Pt demonstrates good motivation to return to PLOF, however appears to have fair motivation to participate in active exercise as the primary course of treatment  Plan: Progress treatment as tolerated         Precautions: HTN, GERD    Daily Treatment Diary     Manual  7/3 7/11      STM B/L UT, LS, SCM, cervical paraspinals -- --      STM thoracic paraspinals, rhomboids, MT, LT, B/L lats Performed Performed      Manual resisted thoracic expansion  Performed --      P-A mobs thoracic spine Grade IV  Performed                  Exercise Diary  7/3 7/11      Cervical AROM 10x ea  10x ea        Cane flexion supine 2x10  2# 2x10  standing      Scaption 2x10 2x10      Shoulder IR with TB 2x10   red --      Seated thoracic ext 2x10 2x10      Biceps curl 3#  2x10 3#  2x10      Neuro Re-ed        B/L ER iso 2x10  yellow 2x10  yellow      Cervical isos (4-way) HEP 10x      Rows 2x10  green 2x10  green      Pec doorway stretch 3x30s 3x30s      Lat pull down 11#  2x10 12#  2x10      Shoulder extension  2x10  red 2x10  red      Deep breathing in supine 10x --              Pt edu/HEP  Performed              Time            Modalities

## 2019-07-11 NOTE — TELEPHONE ENCOUNTER
I assume they are talking about the MRI   I ordered an MRI of the chest wall, we can cancel the thoracic spine MRI

## 2019-07-15 ENCOUNTER — OFFICE VISIT (OUTPATIENT)
Dept: PHYSICAL THERAPY | Facility: CLINIC | Age: 74
End: 2019-07-15
Payer: COMMERCIAL

## 2019-07-15 DIAGNOSIS — M47.814 SPONDYLOSIS OF THORACIC REGION WITHOUT MYELOPATHY OR RADICULOPATHY: Primary | ICD-10-CM

## 2019-07-15 DIAGNOSIS — M62.830 SPASM OF THORACIC BACK MUSCLE: ICD-10-CM

## 2019-07-15 PROCEDURE — 97112 NEUROMUSCULAR REEDUCATION: CPT

## 2019-07-15 PROCEDURE — 97140 MANUAL THERAPY 1/> REGIONS: CPT

## 2019-07-15 PROCEDURE — 97110 THERAPEUTIC EXERCISES: CPT
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
## 2019-07-17 ENCOUNTER — APPOINTMENT (OUTPATIENT)
Dept: PHYSICAL THERAPY | Facility: CLINIC | Age: 74
End: 2019-07-17
Payer: COMMERCIAL

## 2019-07-17 ENCOUNTER — TELEPHONE (OUTPATIENT)
Dept: OBGYN CLINIC | Facility: CLINIC | Age: 74
End: 2019-07-17

## 2019-07-17 ENCOUNTER — TELEPHONE (OUTPATIENT)
Dept: OBGYN CLINIC | Facility: HOSPITAL | Age: 74
End: 2019-07-17

## 2019-07-17 DIAGNOSIS — R07.81 RIB PAIN ON RIGHT SIDE: Primary | ICD-10-CM

## 2019-07-17 DIAGNOSIS — M47.814 SPONDYLOSIS OF THORACIC REGION WITHOUT MYELOPATHY OR RADICULOPATHY: ICD-10-CM

## 2019-07-17 NOTE — TELEPHONE ENCOUNTER
I spoke to Dr Ben Morrow specifically regarding this MRI order  He and I both were at the understanding that we would order the Thoracic at the time of the previous call

## 2019-07-17 NOTE — TELEPHONE ENCOUNTER
When I spoke with Dr Oliver Oliva on 7/11, it was determined at that time that we would proceed with Thoracic Spine MRI  We submitted to [employer]  [employer] has now DENIED MRI Thoracic spine  At this time, should we go ahead and order the CHEST MRI? Please advise on this

## 2019-07-17 NOTE — TELEPHONE ENCOUNTER
MRI order has the wrong code  Patient  states that he has been trying to get this straightened out for weeks and his chest hurts  Patient states he is in pain and needs to get this MRI done  Patient expresses concern over being able to get in touch with Dr Sheela Burt office  Patient reports calling several times and no one answers  Please call Delmis Tuttle [phone] as soon as possible

## 2019-07-17 NOTE — TELEPHONE ENCOUNTER
Received a fax from [employer] denying the Thoracic MRI  The fax states that there was no mention of sternal issues before and that an x-ray may be indicated but not an MRI  The patient has called and is stating pain in the chest per phone encounter from today   Please advise asap

## 2019-07-17 NOTE — PROGRESS NOTES
Daily Note      Today's date: 2019  Patient name: Florencio Cline  : 1945  MRN: [id number]  Referring provider: Munir Black DO  Dx:   Encounter Diagnosis     ICD-10-CM    1  Spondylosis of thoracic region without myelopathy or radiculopathy M47 814    2  Spasm of thoracic back muscle M62 830        Subjective: ***       Objective: See treatment diary below    Assessment: Pt tolerated treatment {TOLERATED:[id number]}  {ASSESSMENT:77507}    Plan: {NSRW:02774}     Precautions: HTN, GERD    Daily Treatment Diary     Manual  7/3 7/11 7/15     STM B/L UT, LS, SCM, cervical paraspinals -- -- Performed     STM thoracic paraspinals, rhomboids, MT, LT, B/L lats Performed Performed Performed     Manual resisted thoracic expansion  Performed -- --     P-A mobs thoracic spine Grade IV  Performed Performed                 Exercise Diary  7/3 7/11 7/15     Cervical AROM 10x ea  10x ea  10x ea       Cane flexion supine 2x10  2# 2x10  standing 2x10       Scaption 2x10 2x10 2x10       Shoulder IR with TB 2x10   red -- 2x10  red     Seated thoracic ext 2x10 2x10 2x10     Biceps curl 3#  2x10 3#  2x10 3#  2x10                     Neuro Re-ed        B/L ER  2x10  yellow 2x10  yellow 2x10  red     Cervical isos (4-way) HEP 10x 10x     Rows 2x10  green 2x10  green 2x10  green     Pec doorway stretch 3x30s 3x30s 3x30s     Lat pull down 11#  2x10 12#  2x10 13# w/ PB  2x10     Shoulder extension  2x10  red 2x10  red 2x10  red     Deep breathing in supine 10x -- --             Pt edu/HEP  Performed              Time            Modalities

## 2019-07-18 ENCOUNTER — DOCUMENTATION (OUTPATIENT)
Dept: OBGYN CLINIC | Facility: CLINIC | Age: 74
End: 2019-07-18

## 2019-07-18 NOTE — TELEPHONE ENCOUNTER
I have resubmitted the new request for CHEST MRI to [employer] this morning  I also spoke with Zuleima Mccray and advised him of the status of the Thoracic MRI being denied and that we are ordering a new MRI that was submitted today  I explained that [employer] can take up to 3 business days to get back to us with the decision but we would be in touch once we have their decision on the MRI Chest   He was very appreciative of my phone call  I did also provide him my direct number as he said he had much difficulty getting through to the office

## 2019-07-23 ENCOUNTER — APPOINTMENT (OUTPATIENT)
Dept: PHYSICAL THERAPY | Facility: CLINIC | Age: 74
End: 2019-07-23
Payer: COMMERCIAL

## 2019-07-23 ENCOUNTER — TELEPHONE (OUTPATIENT)
Dept: OBGYN CLINIC | Facility: CLINIC | Age: 74
End: 2019-07-23

## 2019-07-23 NOTE — TELEPHONE ENCOUNTER
I did speak with Carlos and he would like us to move forward with MRI Chest through his personal health ins  Since his MRI was denied through [employer], we can certainly do this    However, please still advise on the next step, thanks
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
## 2019-07-24 NOTE — TELEPHONE ENCOUNTER
I did check with his primary health insurance and neither MRI - Chest or Thoracic - requires any authorization  Please advise on how to proceed?

## 2019-07-24 NOTE — TELEPHONE ENCOUNTER
Carlos just called me and was inquiring about MRI  He is very open to moving forward with the MRI through his personal health insurance  He wants to have this done and proceed with treatment plan       On a different note, He informed me that the AUTO also Denied any more therapy visits

## 2019-07-24 NOTE — TELEPHONE ENCOUNTER
Patient's 's office, Loida Pay from [facility] and [employer] is calling to state that the diagnostic testing ordered by Dr Judah Jon has been denied by [employer] and that she hopes Dr Judah Jon is appealing the decision  She wants us to follow up with the patient in regards to these tests  Chandler Kiming that we are in touch with patient as of yesterday      If you have any questions, please contact Loida Pay BE#[phone]

## 2019-07-25 ENCOUNTER — APPOINTMENT (OUTPATIENT)
Dept: PHYSICAL THERAPY | Facility: CLINIC | Age: 74
End: 2019-07-25
Payer: COMMERCIAL

## 2019-07-29 ENCOUNTER — APPOINTMENT (OUTPATIENT)
Dept: PHYSICAL THERAPY | Facility: CLINIC | Age: 74
End: 2019-07-29
Payer: COMMERCIAL

## 2019-07-31 ENCOUNTER — APPOINTMENT (OUTPATIENT)
Dept: PHYSICAL THERAPY | Facility: CLINIC | Age: 74
End: 2019-07-31
Payer: COMMERCIAL

## 2019-08-02 ENCOUNTER — HOSPITAL ENCOUNTER (OUTPATIENT)
Dept: RADIOLOGY | Facility: HOSPITAL | Age: 74
Discharge: HOME | End: 2019-08-02
Payer: COMMERCIAL

## 2019-08-02 DIAGNOSIS — R07.81 RIB PAIN ON RIGHT SIDE: ICD-10-CM

## 2019-08-02 PROCEDURE — 71550 MRI CHEST W/O DYE: CPT

## 2019-08-07 ENCOUNTER — TELEPHONE (OUTPATIENT)
Dept: OBGYN CLINIC | Facility: CLINIC | Age: 74
End: 2019-08-07

## 2019-08-07 NOTE — TELEPHONE ENCOUNTER
Called patient and advised  He states that middle part of sternum, when he bends forwards and back and feels and hears a pop  Please advise

## 2019-08-07 NOTE — TELEPHONE ENCOUNTER
----- Message from Akhil Koehler DO sent at 8/7/2019 10:14 AM EDT -----  Reno Modi had an MRI of his chest   The MRI did not show any abnormality and no evidence of rib fracture  If he still has pain, we could try another round of physical therapy  He can follow up with me as needed

## 2019-08-07 NOTE — TELEPHONE ENCOUNTER
The area looked normal on his MRI  He does have cartilage at that level which can sometimes move and get irritated  This is numbness costochondritis  My suggestion would be try to avoid activities that produce the popping and it should improve

## 2019-08-14 ENCOUNTER — OFFICE VISIT (OUTPATIENT)
Dept: OBGYN CLINIC | Facility: CLINIC | Age: 74
End: 2019-08-14
Payer: COMMERCIAL

## 2019-08-14 VITALS
WEIGHT: 216 LBS | HEART RATE: 70 BPM | HEIGHT: 69 IN | BODY MASS INDEX: 31.99 KG/M2 | DIASTOLIC BLOOD PRESSURE: 83 MMHG | SYSTOLIC BLOOD PRESSURE: 135 MMHG

## 2019-08-14 DIAGNOSIS — M94.0 COSTOCHONDRITIS: Primary | ICD-10-CM

## 2019-08-14 DIAGNOSIS — R29.898 XIPHOID PROMINENCE: ICD-10-CM

## 2019-08-14 PROCEDURE — 99213 OFFICE O/P EST LOW 20 MIN: CPT | Performed by: ORTHOPAEDIC SURGERY
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
## 2019-09-03 ENCOUNTER — TELEPHONE (OUTPATIENT)
Dept: FAMILY MEDICINE CLINIC | Facility: CLINIC | Age: 74
End: 2019-09-03

## 2019-09-03 NOTE — TELEPHONE ENCOUNTER
Patient called and stated he is feeling a "weird sensation in his chest, right in the middle"    Triaged to [address] [facility]
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
## 2019-09-09 ENCOUNTER — OFFICE VISIT (OUTPATIENT)
Dept: FAMILY MEDICINE CLINIC | Facility: CLINIC | Age: 74
End: 2019-09-09
Payer: COMMERCIAL

## 2019-09-09 VITALS
SYSTOLIC BLOOD PRESSURE: 136 MMHG | HEART RATE: 98 BPM | DIASTOLIC BLOOD PRESSURE: 80 MMHG | BODY MASS INDEX: 32.88 KG/M2 | WEIGHT: 222 LBS | TEMPERATURE: 98.3 F | OXYGEN SATURATION: 98 % | HEIGHT: 69 IN | RESPIRATION RATE: 16 BRPM

## 2019-09-09 DIAGNOSIS — R07.89 RIGHT-SIDED CHEST WALL PAIN: Primary | ICD-10-CM

## 2019-09-09 PROCEDURE — 99213 OFFICE O/P EST LOW 20 MIN: CPT | Performed by: FAMILY MEDICINE
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
## 2019-10-24 ENCOUNTER — TELEPHONE (OUTPATIENT)
Dept: OBGYN CLINIC | Facility: CLINIC | Age: 74
End: 2019-10-24

## 2019-12-24 ENCOUNTER — OFFICE VISIT (OUTPATIENT)
Dept: FAMILY MEDICINE CLINIC | Facility: CLINIC | Age: 74
End: 2019-12-24
Payer: COMMERCIAL

## 2019-12-24 VITALS
RESPIRATION RATE: 16 BRPM | OXYGEN SATURATION: 96 % | WEIGHT: 219 LBS | TEMPERATURE: 98.8 F | SYSTOLIC BLOOD PRESSURE: 128 MMHG | HEART RATE: 85 BPM | BODY MASS INDEX: 32.44 KG/M2 | DIASTOLIC BLOOD PRESSURE: 80 MMHG | HEIGHT: 69 IN

## 2019-12-24 DIAGNOSIS — E66.9 OBESITY (BMI 30-39.9): ICD-10-CM

## 2019-12-24 DIAGNOSIS — G47.33 OBSTRUCTIVE SLEEP APNEA: ICD-10-CM

## 2019-12-24 DIAGNOSIS — M25.511 CHRONIC RIGHT SHOULDER PAIN: ICD-10-CM

## 2019-12-24 DIAGNOSIS — G89.29 CHRONIC RIGHT SHOULDER PAIN: ICD-10-CM

## 2019-12-24 DIAGNOSIS — M54.2 NECK PAIN: Primary | ICD-10-CM

## 2019-12-24 PROBLEM — S20.211A CONTUSION OF RIGHT CHEST WALL: Status: RESOLVED | Noted: 2019-04-23 | Resolved: 2019-12-24

## 2019-12-24 PROBLEM — I10 BENIGN ESSENTIAL HTN: Status: RESOLVED | Noted: 2018-01-09 | Resolved: 2019-12-24

## 2019-12-24 PROCEDURE — 3008F BODY MASS INDEX DOCD: CPT | Performed by: FAMILY MEDICINE

## 2019-12-24 PROCEDURE — 1160F RVW MEDS BY RX/DR IN RCRD: CPT | Performed by: FAMILY MEDICINE

## 2019-12-24 PROCEDURE — 1036F TOBACCO NON-USER: CPT | Performed by: FAMILY MEDICINE

## 2019-12-24 PROCEDURE — 99214 OFFICE O/P EST MOD 30 MIN: CPT | Performed by: FAMILY MEDICINE

## 2019-12-24 RX ORDER — CYCLOBENZAPRINE HCL 10 MG
10 TABLET ORAL
Qty: 14 TABLET | Refills: 0 | Status: SHIPPED | OUTPATIENT
Start: 2019-12-24 | End: 2020-02-10

## 2019-12-24 RX ORDER — METHYLPREDNISOLONE 4 MG/1
TABLET ORAL
Qty: 21 TABLET | Refills: 0 | Status: SHIPPED | OUTPATIENT
Start: 2019-12-24 | End: 2019-12-30

## 2019-12-24 NOTE — PROGRESS NOTES
Assessment/Plan:    No problem-specific Assessment & Plan notes found for this encounter  bp is normal w/o meds, he had lost wt, cont to monitor to keep wnl advised    Neck pain, suspect strain, no radicular symptoms, medrol and flexeril, fall/sedation caution, PT if no better    Right shoulder pain, chronic, medrol and PT suggested    CATHY, not using cpap, aware of risks, encouraged to f/u with sleep specialist in future    BMI Counseling: Body mass index is 32 34 kg/m²  Discussed the patient's BMI with him  The BMI is above normal  Nutrition recommendations include decreasing overall calorie intake  Diagnoses and all orders for this visit:    Neck pain  -     methylPREDNISolone 4 MG tablet therapy pack; Use as directed on package  -     cyclobenzaprine (FLEXERIL) 10 mg tablet; Take 1 tablet (10 mg total) by mouth daily at bedtime as needed for muscle spasms  -     Ambulatory referral to Physical Therapy; Future    Chronic right shoulder pain  -     methylPREDNISolone 4 MG tablet therapy pack; Use as directed on package  -     cyclobenzaprine (FLEXERIL) 10 mg tablet; Take 1 tablet (10 mg total) by mouth daily at bedtime as needed for muscle spasms  -     XR shoulder 2+ vw right; Future    Obesity (BMI 30-39  9)    Obstructive sleep apnea        Return if symptoms worsen or fail to improve  Subjective:      Patient ID: Goldie Luevano is a 76 y o  male      Chief Complaint   Patient presents with    Neck Pain     for the past 3 days [id number]        HPI  Neck pain  3d  Sharp px  B/l posterior  Adarsh with movt  No changes in pillow beforehand  Adarsh extension  No numbness in arms  Some flexion  No injury or overuse recalled  No meds tried  Affects sleep  Swallowing ok  No f/c    Ran out of bp meds about 2m      Right shoulder pain  Months  No injury  Most rom  No swelling   No weakness  No meds tried    The following portions of the patient's history were reviewed and updated as appropriate: allergies, current medications, past family history, past medical history, past social history, past surgical history and problem list     Review of Systems   Respiratory: Negative for wheezing  Cardiovascular: Negative for chest pain  Current Outpatient Medications   Medication Sig Dispense Refill    Ginkgo Biloba 40 MG TABS Take 1 tablet by mouth daily      naproxen (NAPROSYN) 500 mg tablet Take 1 tablet (500 mg total) by mouth 2 (two) times a day with meals 60 tablet 0    cyclobenzaprine (FLEXERIL) 10 mg tablet Take 1 tablet (10 mg total) by mouth daily at bedtime as needed for muscle spasms 14 tablet 0    methylPREDNISolone 4 MG tablet therapy pack Use as directed on package 21 tablet 0     No current facility-administered medications for this visit  Objective:    /80   Pulse 85   Temp 98 8 °F (37 1 °C)   Resp 16   Ht 5' 9" (1 753 m)   Wt 99 3 kg (219 lb)   SpO2 96%   BMI 32 34 kg/m²        Physical Exam   Constitutional: He appears well-developed  No distress  HENT:   Head: Normocephalic  Right Ear: External ear normal    Left Ear: External ear normal    Mouth/Throat: No oropharyngeal exudate  Eyes: Conjunctivae are normal    Neck: Neck supple  No thyromegaly present  Cardiovascular: Normal rate, regular rhythm and intact distal pulses  Pulmonary/Chest: Effort normal and breath sounds normal  No respiratory distress  He has no wheezes  Abdominal: Soft  There is no tenderness  Musculoskeletal: He exhibits tenderness  He exhibits no edema or deformity  Limited c spine extension/flexion and b/l rotation, no pain with compression   Lymphadenopathy:     He has no cervical adenopathy  Neurological: He is alert  He displays normal reflexes  He exhibits normal muscle tone  Skin: Skin is warm and dry  No pallor  Psychiatric: His behavior is normal  Thought content normal    Nursing note and vitals reviewed               Zoe Joseph DO

## 2020-01-03 ENCOUNTER — TELEPHONE (OUTPATIENT)
Dept: PHYSICAL THERAPY | Facility: CLINIC | Age: 75
End: 2020-01-03

## 2020-01-07 ENCOUNTER — EVALUATION (OUTPATIENT)
Dept: PHYSICAL THERAPY | Facility: CLINIC | Age: 75
End: 2020-01-07
Payer: COMMERCIAL

## 2020-01-07 DIAGNOSIS — M54.2 NECK PAIN: Primary | ICD-10-CM

## 2020-01-07 PROCEDURE — 97161 PT EVAL LOW COMPLEX 20 MIN: CPT

## 2020-01-07 NOTE — PROGRESS NOTES
PT Evaluation     Today's date: 2020  Patient name: Maria De Jesus Li  : 1945  MRN: [id number]  Referring provider: Rolf Garcia DO  Dx:   Encounter Diagnosis     ICD-10-CM    1  Neck pain M54 2 Ambulatory referral to Physical Therapy       Start Time: 1410  Stop Time: 1500  Total time in clinic (min): 50 minutes    Assessment  Assessment details: Patient presents with BL neck pain that began about 2 weeks ago with no specific trauma  Pain is at worst 10/10 when moving head and located in BL neck  Pain is at best 0/10 when not moving  Mechanical assessment indicates directional preference for repeated cervical retraction which reduced pain levels  Severe limitations in C spine AROM in extension, SBing and Rotation with painful ranges  NC in symptoms with repeated cervical retraction, but reduction in joana nwith repeated cervical retraction with OP  Severe hypertonicity of UT's, LS, suboccipitals, pect minor  Shoulder severe limitations in AROM in all planes with painful end ranges  Generalized shoulder weakness of 3-/5 BL UE's except for IR  Patient would benefit from skilled PT in order to reduce pain, increase c spine and shoulder AROM, improve BL UE strength, and reduce hypertonicity of BL shoulders 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, reach overhead, reach behind his back, look overhead, carry and lift objects symptom free  Patient was provided with HEP which he demonstrated correctly and verbalized understanding  Patient was informed of red dry skin present on superior L shoulder and stated that prior to PT he had been scratching himself there and was advised to monitor to make sure it doesn't worsen  (No other symptoms present-no fever, no chills, no fatigue, no headache)     Impairments: abnormal muscle tone, abnormal or restricted ROM, impaired physical strength, lacks appropriate home exercise program, pain with function, scapular dyskinesis, poor posture  and poor body mechanics    Symptom irritability: highUnderstanding of Dx/Px/POC: good   Prognosis: good    Goals  STG'S (3-4 weeks):  1  Reduce pain levels to 1-2/10 at best  with reaching overhead to allow patient to complete ADLs and household chores requiring overhead activity  2  Improve strength by 1/4-1/2 in order to lift and carry light objects symptom free  3  Reduce neck pain with repeated movements including cervical retraction which patient shows directional preference for  LTG's (6-8 weeks)  1  Reduce pain to constant 0/10 80% of time  to complete ADLs and household chores requiring overhead activity pain free  2  Increase strength to 5/5 of BL shoulders so he can carry 10lb objects or heavier  Which is required by his job  3  Correct forward head and rounded shoulder posture to prevent further narrowing of the R shoulder subacromial space  Plan  Patient would benefit from: PT eval and skilled physical therapy  Planned modality interventions: thermotherapy: hydrocollator packs, TENS, cryotherapy, ultrasound and unattended electrical stimulation  Planned therapy interventions: manual therapy, joint mobilization, abdominal trunk stabilization, neuromuscular re-education, orthotic fitting/training, patient education, postural training, strengthening, stretching, therapeutic activities, therapeutic exercise, home exercise program, graded exercise, graded motor, functional ROM exercises and flexibility  Frequency: 2x week  Duration in weeks: 8  Plan of Care beginning date: 1/7/2020  Plan of Care expiration date: 3/2/2020  Treatment plan discussed with: patient        Subjective Evaluation    History of Present Illness  Mechanism of injury: Patient reports neck pain began 2 weeks ago and gradually worsened  He is not sure what caused the pain to begin; possibly using recliner  Pain is located BL neck ; reports BL shoulder pain that was present prior to neck pain    NT present in hands which he reports was also present before onset of neck pain  BL neck pain is described as sharp reaching 10/10 with no specific movement  Pain is at best 0/10 with  No movement  Pain  Current pain ratin  At best pain ratin  At worst pain rating: 10  Location: BL neck  Quality: sharp  Relieving factors: rest and relaxation  Aggravating factors: lifting and overhead activity    Social Support  Steps to enter house: yes  Stairs in house: no   Lives in: [place] house  Lives with: alone    Employment status: working (works in [employer])  Hand dominance: right      Diagnostic Tests  No diagnostic tests performed  Treatments  Current treatment: physical therapy  Patient Goals  Patient goals for therapy: increased strength, independence with ADLs/IADLs, increased motion, decreased pain and return to sport/leisure activities          Objective     Palpation     Additional Palpation Details  Severe hypertonicity of UT's, LS, suboccipitals, pect minor  Redness and dry skin present over superior L shoulder  Patient was informed of red dry skin present on superior L shoulder and stated that prior to PT he had been scratching himself there and was advised to monitor to make sure it doesn't worsen      Active Range of Motion   Left Shoulder   Flexion: 120 degrees   Abduction: 105 degrees   External rotation 0°: 30 degrees   Internal rotation 0°: 70 degrees     Right Shoulder   Flexion: 100 degrees with pain  Abduction: 90 degrees with pain  External rotation 0°: 30 degrees with pain  Internal rotation 0°: 80 degrees with pain    Additional Active Range of Motion Details  Cervical AROM:  Flexion: 100%   Extension: 50% pain  Rotation: 75% pain BL  SBin% BL     Mechanical assessment:  Cervical Repeated Retraction with OP: 2 x 10  -reduction in pain   Mechanical Assessment    Cervical    Seated retraction: repeated movements   Pain location: no change  Pain intensity: better  Pain level: decreased    Thoracic      Lumbar      Strength/Myotome Testing     Left Shoulder     Planes of Motion   Flexion: 3-   Abduction: 3-   External rotation at 0°: 3-   Internal rotation at 0°: 5     Isolated Muscles   Biceps: 5   Triceps: 5     Right Shoulder     Planes of Motion   Flexion: 3-   Abduction: 3-   External rotation at 0°: 3-   Internal rotation at 0°: 5     Isolated Muscles   Biceps: 5   Triceps: 5     Tests   Cervical   Negative alar ligament test and Sharp-Michael test      Left   Negative Spurling's Test A  Right   Negative Spurling's Test A  Left Shoulder   Positive drop arm and external rotation lag sign  Right Shoulder   Positive drop arm and empty can  Precautions:   Diagnosis Date Comment Source   Arthritis   Provider   BPH (benign prostatic hypertrophy)  Hx post laser TURP Provider   GERD (gastroesophageal reflux disease)   Provider   H/O exercise stress test   Provider   Hyperlipidemia   Provider   Hypertension   Provider   Morbid obesity ([facility] [address] )  Last Assessed:12/21/2016 ;  Provider   Plantar fascial fibromatosis  Onset:1/23/2012 Provider   Sleep apnea  does not wear CPAP Provider       NM:  Mechanical assessment: 20'  Repeated cervical retraction: 2 x 10 'NC  Repeated cervical retraction with OP: 2 x 10 -reduction in pain   Repeated thoracic extension: 2 x 10       Modalities:  MHP: post tx to c -spine 8' skin intact pre and post   Pre Mod: post t x to c spine 10' (patient cleared from contraindications, PMH reviewed and verbalized no contraindications present when asked by PT)    SKIN INTACT PRE AND POST    Updated HEP: 1/7/20-repeated cervical retraction - PAIN FREE

## 2020-01-09 ENCOUNTER — APPOINTMENT (OUTPATIENT)
Dept: RADIOLOGY | Facility: CLINIC | Age: 75
End: 2020-01-09
Payer: COMMERCIAL

## 2020-01-09 ENCOUNTER — OFFICE VISIT (OUTPATIENT)
Dept: PHYSICAL THERAPY | Facility: CLINIC | Age: 75
End: 2020-01-09
Payer: COMMERCIAL

## 2020-01-09 DIAGNOSIS — G89.29 CHRONIC RIGHT SHOULDER PAIN: ICD-10-CM

## 2020-01-09 DIAGNOSIS — M25.511 CHRONIC RIGHT SHOULDER PAIN: ICD-10-CM

## 2020-01-09 DIAGNOSIS — M54.2 NECK PAIN: Primary | ICD-10-CM

## 2020-01-09 PROCEDURE — 97112 NEUROMUSCULAR REEDUCATION: CPT

## 2020-01-09 PROCEDURE — 73030 X-RAY EXAM OF SHOULDER: CPT

## 2020-01-09 PROCEDURE — 97110 THERAPEUTIC EXERCISES: CPT

## 2020-01-09 NOTE — PROGRESS NOTES
Daily Note     Today's date: 2020  Patient name: Doug Goel  : 1945  MRN: [id number]  Referring provider: Hodan Montemayor DO  Dx:   Encounter Diagnosis     ICD-10-CM    1  Neck pain M54 2        Start Time: 1615  Stop Time: 1700  Total time in clinic (min): 45 minutes    Subjective: patient reports 5/10 L sided neck pain that is located under skull  Pain reduced to 2/10 by end of session  Objective: See treatment diary below      Assessment: Tolerated treatment well  Patient has script from MD for x-ray patient was educated that x ray services are provided at the office next door   educated to D/C prior Hep and begin SBing to L with gentle pain free self OP which reduced symptoms in clinic and minimized pain  Patient would benefit from continued PT      Plan: Continue per plan of care  Precautions:   Diagnosis Date Comment Source   Arthritis   Provider   BPH (benign prostatic hypertrophy)  Hx post laser TURP Provider   GERD (gastroesophageal reflux disease)   Provider   H/O exercise stress test   Provider   Hyperlipidemia   Provider   Hypertension   Provider   Morbid obesity ([address] )  Last Assessed:2016 ;  Provider   Plantar fascial fibromatosis  Onset:2012 Provider   Sleep apnea  does not wear CPAP Provider       TE:  scap retraction: 2 x 10 3"  UT stretch: BL 5 x 10" pain free  LS stretch: 5 x 10" BL pain free  Supine wand flexion:10 x 3" stopped due to increase in shoulder pain   Supine wand bicep curls: 2 x 10 2" pain free 0#    NM:    Repeated cervical retraction: 2 x 10 'NC  Repeated cervical retraction with OP: 2 x 10 -NC  Repeated cervical SBing to L: 2 x 10-NC  Repeated cervical Sbing to L with self OP: pain free 2  X 10-reduction in pain     Modalities:  MHP: post tx to c -spine 8' skin intact pre and post   Pre Mod: post t x to c spine 10' (patient cleared from contraindications, PMH reviewed and verbalized no contraindications present when asked by PT)    SKIN INTACT PRE AND POST    Updated HEP: 1/9/20- repeated cervical SBing with GENTLE self OP every 2 hours- STOP IF SYMPTOMS WORSEN

## 2020-01-13 ENCOUNTER — OFFICE VISIT (OUTPATIENT)
Dept: PHYSICAL THERAPY | Facility: CLINIC | Age: 75
End: 2020-01-13
Payer: COMMERCIAL

## 2020-01-13 DIAGNOSIS — M54.2 NECK PAIN: Primary | ICD-10-CM

## 2020-01-13 PROCEDURE — 97110 THERAPEUTIC EXERCISES: CPT

## 2020-01-13 NOTE — PROGRESS NOTES
Daily Note     Today's date: 2020  Patient name: Edwina Sams  : 1945  MRN: [id number]  Referring provider: Paulina Buenrostro DO  Dx:   Encounter Diagnosis     ICD-10-CM    1  Neck pain M54 2                   Subjective: reports it feels better today, 3-4/10 pain at arrival       Objective: See treatment diary below      Assessment: Tolerated treatment well  Patient exhibited good technique with therapeutic exercises  No difficulty or increase in pain with additions to exercise program today       Plan: Continue per plan of care  Precautions:   Diagnosis Date Comment Source   Arthritis   Provider   BPH (benign prostatic hypertrophy)  Hx post laser TURP Provider   GERD (gastroesophageal reflux disease)   Provider   H/O exercise stress test   Provider   Hyperlipidemia   Provider   Hypertension   Provider   Morbid obesity ([facility] [address] )  Last Assessed:2016 ;  Provider   Plantar fascial fibromatosis  Onset:2012 Provider   Sleep apnea  does not wear CPAP Provider       TE:  scap retraction: 2 x 10 3"    UT stretch: BL 5 x 10" pain free  LS stretch: 5 x 10" B/L pain free  Cervical spine isometrics 5" x 10 each way  RTC isometrics 5" hold x 10 each way   Seated TB rows Red TB 2x10   [place] IR 4# 2x10   Table slide flexion 10" x 10     NM:  Repeated cervical SBing to L: 2 x 10-NC  Repeated cervical Sbing to L with self OP: pain free 2  X 10-reduction in pain   Ext SNAGS-PLAN  Rot SNAGS-PLAN    Modalities:  MHP: post tx to c -spine 8' skin intact pre and post    Pre Mod: post t x to c spine 10' (patient cleared from contraindications, PMH reviewed and verbalized no contraindications present when asked by PT)    SKIN INTACT PRE AND POST    Updated HEP: 20- repeated cervical SBing with GENTLE self OP every 2 hours- STOP IF SYMPTOMS WORSEN     Not today:  Supine wand flexion:10 x 3" stopped due to increase in shoulder pain   Supine wand bicep curls: 2 x 10 2" pain free 0#

## 2020-01-15 ENCOUNTER — OFFICE VISIT (OUTPATIENT)
Dept: PHYSICAL THERAPY | Facility: CLINIC | Age: 75
End: 2020-01-15
Payer: COMMERCIAL

## 2020-01-15 DIAGNOSIS — M54.2 NECK PAIN: Primary | ICD-10-CM

## 2020-01-15 PROCEDURE — 97112 NEUROMUSCULAR REEDUCATION: CPT

## 2020-01-15 PROCEDURE — 97110 THERAPEUTIC EXERCISES: CPT

## 2020-01-15 NOTE — PROGRESS NOTES
Daily Note     Today's date: 1/15/2020  Patient name: Maria De Jesus Li  : 1945  MRN: [id number]  Referring provider: Rolf Garcia DO  Dx:   Encounter Diagnosis     ICD-10-CM    1  Neck pain M54 2        Start Time: 1400  Stop Time: 1500  Total time in clinic (min): 60 minutes    Subjective: reports that he is progressing and did not have increase in pain after last session  3/10 L sided neck pain present upon arrival  Post tx patient called MD office about R shoulder x ray results; medical staff answered stating there was degenerative changes and signs of rotator cuff injury  Objective: See treatment diary below      Assessment: Tolerated treatment well  Patient would benefit from continued PT      Plan: Continue per plan of care        Precautions:   Diagnosis Date Comment Source   Arthritis   Provider   BPH (benign prostatic hypertrophy)  Hx post laser TURP Provider   GERD (gastroesophageal reflux disease)   Provider   H/O exercise stress test   Provider   Hyperlipidemia   Provider   Hypertension   Provider   Morbid obesity ([facility] [address] )  Last Assessed:2016 ;  Provider   Plantar fascial fibromatosis  Onset:2012 Provider   Sleep apnea  does not wear CPAP Provider       TE:  scap retraction: 2 x 10 3"    [place] BL Rows: 8# 2 x 10 -pain free  Wand Extension: 2 x 10 5"  Wand ER shoulder at 0 deg: 1 x 10 5" hold R/L  RTC isometrics 5" hold x 10 each way   Seated TB rows Red TB 2x10   [place] IR 4# 2x10 - not today due to pain Table slide flexion 20 x 5"  Corner stretch: unable due to pain     NM:  Repeated cervical SBing to L: 2 x 10-NC  Repeated cervical Sbing to L with self OP: pain free 2  X 10-reduction in pain   Ext SNAGS-PLAN  Rot SNAGS-PLAN  [place] UL Rows with Thoracic Rotation: 4# 2 x 10 on R; 2 x 6 on L-reduced reps due to discomfort    Modalities:  MHP: post tx to c -spine 8' skin intact pre and post    Pre Mod: post t x to c spine 10' (patient cleared from contraindications, PMH reviewed and verbalized no contraindications present when asked by PT)    SKIN INTACT PRE AND POST    Updated HEP: 1/9/20- repeated cervical SBing with GENTLE self OP every 2 hours- STOP IF SYMPTOMS WORSEN  Not today:  UT stretch: BL 5 x 10" pain free  LS stretch: 5 x 10" B/L pain free  Cervical spine isometrics 5" x 10 each way

## 2020-01-21 ENCOUNTER — APPOINTMENT (OUTPATIENT)
Dept: PHYSICAL THERAPY | Facility: CLINIC | Age: 75
End: 2020-01-21
Payer: COMMERCIAL

## 2020-01-23 ENCOUNTER — OFFICE VISIT (OUTPATIENT)
Dept: PHYSICAL THERAPY | Facility: CLINIC | Age: 75
End: 2020-01-23
Payer: COMMERCIAL

## 2020-01-23 DIAGNOSIS — M54.2 NECK PAIN: Primary | ICD-10-CM

## 2020-01-23 PROCEDURE — 97110 THERAPEUTIC EXERCISES: CPT

## 2020-01-23 NOTE — PROGRESS NOTES
Daily Note     Today's date: 2020  Patient name: Jensen Pearson  : 1945  MRN: [id number]  Referring provider: Shiloh Teran DO  Dx:   Encounter Diagnosis     ICD-10-CM    1  Neck pain M54 2                   Subjective: pt reports having vertigo symptoms remaining from earlier today  Reports trying treatment that was unsuccessful  2/10 at arrival  Reports feeling better after the exercises 1/10   Pt arrived 10 min late     Objective: See treatment diary below      Assessment: Tolerated treatment well  Patient exhibited good technique with therapeutic exercisesPt able to complete some exercises today that were once painful       Plan: Continue per plan of care  Precautions:   Diagnosis Date Comment Source   Arthritis   Provider   BPH (benign prostatic hypertrophy)  Hx post laser TURP Provider   GERD (gastroesophageal reflux disease)   Provider   H/O exercise stress test   Provider   Hyperlipidemia   Provider   Hypertension   Provider   Morbid obesity ([facility] [address] )  Last Assessed:2016 ;  Provider   Plantar fascial fibromatosis  Onset:2012 Provider   Sleep apnea  does not wear CPAP Provider       TE:  scap retraction: 2 x 10 3"    [place] BL Rows: 8# 2 x 10 -pain free  [place] IR 4# 2x10 - not today due to pain Table slide flexion 20 x 5"   Wand Extension: 2 x 10 5"  Wand ER shoulder at 0 deg: 1 x 10 5" hold R/L  RTC isometrics 5" hold x 10 each way   Corner stretch:10"x10       NM:  Repeated cervical SBing to L: 2 x 10-NC  LS stretch: 5 x 10" B/L pain free  Repeated cervical Sbing to L with self OP: pain free 2  X 10-reduction in pain   Ext SNAGS-PLAN  Rot SNAGS-PLAN  [place] UL Rows with Thoracic Rotation: 4# 2 x 10 on R; 2 x10 L  Modalities:  MHP: post tx to c -spine 8' skin intact pre and post    Pre Mod: post t x to c spine 10' (patient cleared from contraindications, PMH reviewed and verbalized no contraindications present when asked by PT)    SKIN INTACT PRE AND POST    Updated HEP: 20- repeated cervical SBing with GENTLE self OP every 2 hours- STOP IF SYMPTOMS WORSEN  Not today:  Seated TB rows Red TB 2x10    UT stretch: BL 5 x 10" pain free  Cervical spine isometrics 5" x 10 each way

## 2020-01-27 ENCOUNTER — APPOINTMENT (OUTPATIENT)
Dept: PHYSICAL THERAPY | Facility: CLINIC | Age: 75
End: 2020-01-27
Payer: COMMERCIAL

## 2020-01-29 ENCOUNTER — APPOINTMENT (OUTPATIENT)
Dept: PHYSICAL THERAPY | Facility: CLINIC | Age: 75
End: 2020-01-29
Payer: COMMERCIAL

## 2020-01-29 ENCOUNTER — TELEPHONE (OUTPATIENT)
Dept: PHYSICAL THERAPY | Facility: CLINIC | Age: 75
End: 2020-01-29

## 2020-01-29 NOTE — TELEPHONE ENCOUNTER
1/29/20:patient called to [facility] reporting that he has vertigo   treating PT called patient back to inform him that we can treat vertigo, but patient refused and stated that he wants to go to doctor first

## 2020-01-30 ENCOUNTER — APPOINTMENT (OUTPATIENT)
Dept: PHYSICAL THERAPY | Facility: CLINIC | Age: 75
End: 2020-01-30
Payer: COMMERCIAL

## 2020-02-10 ENCOUNTER — OFFICE VISIT (OUTPATIENT)
Dept: FAMILY MEDICINE CLINIC | Facility: CLINIC | Age: 75
End: 2020-02-10
Payer: COMMERCIAL

## 2020-02-10 VITALS
TEMPERATURE: 99.1 F | SYSTOLIC BLOOD PRESSURE: 122 MMHG | HEART RATE: 80 BPM | OXYGEN SATURATION: 97 % | DIASTOLIC BLOOD PRESSURE: 80 MMHG | RESPIRATION RATE: 16 BRPM | HEIGHT: 69 IN | BODY MASS INDEX: 33.41 KG/M2 | WEIGHT: 225.6 LBS

## 2020-02-10 DIAGNOSIS — M25.50 ARTHRALGIA, UNSPECIFIED JOINT: ICD-10-CM

## 2020-02-10 DIAGNOSIS — L29.9 ITCHING: Primary | ICD-10-CM

## 2020-02-10 DIAGNOSIS — I71.4 ABDOMINAL AORTIC ANEURYSM (AAA) WITHOUT RUPTURE (HCC): ICD-10-CM

## 2020-02-10 PROCEDURE — 1160F RVW MEDS BY RX/DR IN RCRD: CPT | Performed by: FAMILY MEDICINE

## 2020-02-10 PROCEDURE — 3079F DIAST BP 80-89 MM HG: CPT | Performed by: FAMILY MEDICINE

## 2020-02-10 PROCEDURE — 99214 OFFICE O/P EST MOD 30 MIN: CPT | Performed by: FAMILY MEDICINE

## 2020-02-10 PROCEDURE — 1036F TOBACCO NON-USER: CPT | Performed by: FAMILY MEDICINE

## 2020-02-10 PROCEDURE — 3074F SYST BP LT 130 MM HG: CPT | Performed by: FAMILY MEDICINE

## 2020-02-10 PROCEDURE — 3008F BODY MASS INDEX DOCD: CPT | Performed by: FAMILY MEDICINE

## 2020-02-10 RX ORDER — DESLORATADINE 5 MG/1
5 TABLET ORAL DAILY
Qty: 90 TABLET | Refills: 1 | Status: SHIPPED | OUTPATIENT
Start: 2020-02-10 | End: 2020-03-31

## 2020-02-10 NOTE — PATIENT INSTRUCTIONS
If the desloratadine pill (anti-histamine) is not covered by insurance, you could try an OTC product called xyzal (levocetirizine)

## 2020-02-10 NOTE — PROGRESS NOTES
Assessment/Plan:    No problem-specific Assessment & Plan notes found for this encounter  Consider allergist in future  Arthralgia w/u  AAA f/u with US in April 2020, given  Dermographism noted     Diagnoses and all orders for this visit:    Itching  -     CBC and differential; Future  -     Food Allergy Profile; Future  -     [place] Allergy Panel, Adult; Future  -     desloratadine (CLARINEX) 5 MG tablet; Take 1 tablet (5 mg total) by mouth daily    Abdominal aortic aneurysm (AAA) without rupture ([address] )  -     US abdominal aorta; Future    Arthralgia, unspecified joint  -     Lyme Antibody Profile with reflex to WB; Future  -     YOGESH Screen w/ Reflex to Titer/Pattern; Future  -     RF Screen w/ Reflex to Titer; Future        Return if symptoms worsen or fail to improve  Subjective:      Patient ID: Toño Bravo is a 76 y o  male  Chief Complaint   Patient presents with    Rash     itching all over  it became worst a few months ago  vfrma       HPI  Off bp meds  Kept wt off mostly  Passed CDL recently    Years  Getting worse lately  Very itchy  B/l knees, elbows, arms, wrists, back  No rash seen  RA? All year, not seasonal  No food triggers  No joint swelling  Burning sensation in hands  No vesicles or pustules seen  Use same detergent tid  Dove soap  Hot showers taken    The following portions of the patient's history were reviewed and updated as appropriate: allergies, current medications, past family history, past medical history, past social history, past surgical history and problem list     Review of Systems   Constitutional: Negative for chills and fever  Current Outpatient Medications   Medication Sig Dispense Refill    desloratadine (CLARINEX) 5 MG tablet Take 1 tablet (5 mg total) by mouth daily 90 tablet 1    Ginkgo Biloba 40 MG TABS Take 1 tablet by mouth daily       No current facility-administered medications for this visit          Objective:    /80   Pulse 80   Temp 99 1 °F (37 3 °C)   Resp 16   Ht 5' 9" (1 753 m)   Wt 102 kg (225 lb 9 6 oz)   SpO2 97%   BMI 33 32 kg/m²        Physical Exam   Constitutional: He appears well-developed  No distress  HENT:   Head: Normocephalic  Right Ear: External ear normal    Left Ear: External ear normal    Mouth/Throat: No oropharyngeal exudate  Eyes: Conjunctivae are normal  No scleral icterus  Neck: Neck supple  Cardiovascular: Normal rate, regular rhythm and intact distal pulses  Pulmonary/Chest: Effort normal and breath sounds normal  No respiratory distress  He has no wheezes  Abdominal: Soft  Bowel sounds are normal  There is no tenderness  Musculoskeletal: He exhibits no edema or deformity  Neurological: He is alert  Skin: Skin is warm and dry  No rash noted  No pallor  Dermographism, excoriations on arms and legs b/l   Psychiatric: His behavior is normal  Thought content normal    Nursing note and vitals reviewed  BMI Counseling: Body mass index is 33 32 kg/m²  The BMI is above normal  Nutrition recommendations include moderation in carbohydrate intake  Exercise recommendations include exercising 3-5 times per week  No pharmacotherapy was ordered                Shaq Loyd DO

## 2020-02-17 ENCOUNTER — TELEPHONE (OUTPATIENT)
Dept: GASTROENTEROLOGY | Facility: AMBULARY SURGERY CENTER | Age: 75
End: 2020-02-17

## 2020-02-17 NOTE — PROGRESS NOTES
2/17/20: patient has not returned to PT since 1/23/20  He has been contacted by treating PT on 1/29/20 and stated he would like to hold due to vertigo issues  Has not called to schedule PT since  [address] C[address]as also called patient last week who did not answer and LVM  Patient is considered self DC at this time

## 2020-02-17 NOTE — TELEPHONE ENCOUNTER
Letter sent to patient to call in and schedule repeat EGD/colonoscopy with Dr May Khalil due to history of polyps and GERD per recall set

## 2020-02-20 LAB
A ALTERNATA IGE QN: <0.1 KU/L
A FUMIGATUS IGE QN: <0.1 KU/L
ANA TITR SER IF: NEGATIVE {TITER}
B BURGDOR IGG+IGM SER-ACNC: <0.91 ISR (ref 0–0.9)
B BURGDOR IGM SER IA-ACNC: <0.8 INDEX (ref 0–0.79)
BASOPHILS # BLD AUTO: 0 X10E3/UL (ref 0–0.2)
BASOPHILS NFR BLD AUTO: 0 %
BERMUDA GRASS IGE QN: <0.1 KU/L
BOXELDER IGE QN: <0.1 KU/L
C HERBARUM IGE QN: <0.1 KU/L
CAT DANDER IGE QN: <0.1 KU/L
CMN PIGWEED IGE QN: <0.1 KU/L
CODFISH IGE QN: <0.1 KU/L
COMMON RAGWEED IGE QN: <0.1 KU/L
COTTONWOOD IGE QN: <0.1 KU/L
COW MILK IGE QN: <0.1 KU/L
D FARINAE IGE QN: <0.1 KU/L
D PTERONYSS IGE QN: <0.1 KU/L
DOG DANDER IGE QN: <0.1 KU/L
EGG WHITE IGE QN: <0.1 KU/L
EOSINOPHIL # BLD AUTO: 0.1 X10E3/UL (ref 0–0.4)
EOSINOPHIL NFR BLD AUTO: 1 %
ERYTHROCYTE [DISTWIDTH] IN BLOOD BY AUTOMATED COUNT: 13.3 % (ref 11.6–15.4)
HCT VFR BLD AUTO: 50.9 % (ref 37.5–51)
HGB BLD-MCNC: 17.2 G/DL (ref 13–17.7)
IGE SERPL-ACNC: 179 IU/ML (ref 6–495)
IMM GRANULOCYTES # BLD: 0 X10E3/UL (ref 0–0.1)
IMM GRANULOCYTES NFR BLD: 0 %
LONDON PLANE IGE QN: <0.1 KU/L
LYMPHOCYTES # BLD AUTO: 2.2 X10E3/UL (ref 0.7–3.1)
LYMPHOCYTES NFR BLD AUTO: 33 %
Lab: NORMAL
MCH RBC QN AUTO: 31.2 PG (ref 26.6–33)
MCHC RBC AUTO-ENTMCNC: 33.8 G/DL (ref 31.5–35.7)
MCV RBC AUTO: 92 FL (ref 79–97)
MONOCYTES # BLD AUTO: 0.6 X10E3/UL (ref 0.1–0.9)
MONOCYTES NFR BLD AUTO: 9 %
MOUSE URINE PROT IGE QN: <0.1 KU/L
MT JUNIPER IGE QN: <0.1 KU/L
MUGWORT IGE QN: <0.1 KU/L
NEUTROPHILS # BLD AUTO: 3.7 X10E3/UL (ref 1.4–7)
NEUTROPHILS NFR BLD AUTO: 57 %
PEANUT IGE QN: <0.1 KU/L
PENICILLIUM CHRYSOGENUM: <0.1 KU/L
PLATELET # BLD AUTO: 183 X10E3/UL (ref 150–450)
RBC # BLD AUTO: 5.52 X10E6/UL (ref 4.14–5.8)
RHEUMATOID FACT SERPL-ACNC: 12.3 IU/ML (ref 0–13.9)
ROACH IGE QN: <0.1 KU/L
SHEEP SORREL IGE QN: <0.1 KU/L
SILVER BIRCH IGE QN: <0.1 KU/L
SOYBEAN IGE QN: <0.1 KU/L
TIMOTHY IGE QN: <0.1 KU/L
WALNUT IGE: <0.1 KU/L
WBC # BLD AUTO: 6.6 X10E3/UL (ref 3.4–10.8)
WHEAT IGE QN: <0.1 KU/L
WHITE ASH IGE QN: <0.1 KU/L
WHITE ELM IGE QN: <0.1 KU/L
WHITE MULBERRY IGE QN: <0.1 KU/L
WHITE OAK IGE QN: <0.1 KU/L

## 2020-03-06 DIAGNOSIS — Z12.11 SCREEN FOR COLON CANCER: Primary | ICD-10-CM

## 2020-03-06 NOTE — TELEPHONE ENCOUNTER
Pt has been scheduled with Dr Rashad Maciel [facility] 3/11/2020      Please send Suprep to pharmacy on file: Dx History of Polyps    Pt coming into office for instructions tomorrow

## 2020-03-10 ENCOUNTER — ANESTHESIA EVENT (OUTPATIENT)
Dept: GASTROENTEROLOGY | Facility: AMBULARY SURGERY CENTER | Age: 75
End: 2020-03-10

## 2020-03-10 NOTE — PRE-PROCEDURE INSTRUCTIONS
Pre-Surgery Instructions:   Medication Instructions    desloratadine (CLARINEX) 5 MG tablet Patient was instructed by Physician and understands

## 2020-03-10 NOTE — ANESTHESIA PREPROCEDURE EVALUATION
Review of Systems/Medical History  Patient summary reviewed  Chart reviewed  History of anesthetic complications PONV    Cardiovascular  Hyperlipidemia, Hypertension , No history of CABG, Aortic disease (AAA),    Pulmonary  Sleep apnea ,        GI/Hepatic    No GERD ,        Prostatic disorder, benign prostatic hyperplasia       Endo/Other    Obesity    GYN       Hematology   Musculoskeletal    Arthritis     Neurology   Psychology           Physical Exam    Airway    Mallampati score: II  TM Distance: >3 FB  Neck ROM: full     Dental       Cardiovascular  Rhythm: regular, Rate: normal,     Pulmonary  Breath sounds clear to auscultation,     Other Findings        Anesthesia Plan  ASA Score- 3     Anesthesia Type- IV sedation with anesthesia with ASA Monitors  Additional Monitors:   Airway Plan:         Plan Factors-    Induction- intravenous  Postoperative Plan-     Informed Consent- Anesthetic plan and risks discussed with patient  I personally reviewed this patient with the CRNA  Discussed and agreed on the Anesthesia Plan with the CRNA  Joseline Marino

## 2020-03-11 ENCOUNTER — HOSPITAL ENCOUNTER (OUTPATIENT)
Dept: GASTROENTEROLOGY | Facility: AMBULARY SURGERY CENTER | Age: 75
Setting detail: OUTPATIENT SURGERY
Discharge: HOME | End: 2020-03-11
Attending: INTERNAL MEDICINE | Admitting: INTERNAL MEDICINE
Payer: COMMERCIAL

## 2020-03-11 ENCOUNTER — ANESTHESIA (OUTPATIENT)
Dept: GASTROENTEROLOGY | Facility: AMBULARY SURGERY CENTER | Age: 75
End: 2020-03-11

## 2020-03-11 VITALS
DIASTOLIC BLOOD PRESSURE: 71 MMHG | BODY MASS INDEX: 31.55 KG/M2 | TEMPERATURE: 97.5 F | OXYGEN SATURATION: 100 % | HEIGHT: 69 IN | RESPIRATION RATE: 18 BRPM | WEIGHT: 213 LBS | SYSTOLIC BLOOD PRESSURE: 121 MMHG | HEART RATE: 69 BPM

## 2020-03-11 DIAGNOSIS — Z86.010 HX OF COLONIC POLYPS: ICD-10-CM

## 2020-03-11 DIAGNOSIS — K21.9 GASTROESOPHAGEAL REFLUX DISEASE WITHOUT ESOPHAGITIS: ICD-10-CM

## 2020-03-11 PROCEDURE — 45385 COLONOSCOPY W/LESION REMOVAL: CPT | Performed by: INTERNAL MEDICINE

## 2020-03-11 PROCEDURE — 88305 TISSUE EXAM BY PATHOLOGIST: CPT | Performed by: PATHOLOGY

## 2020-03-11 RX ORDER — PROPOFOL 10 MG/ML
INJECTION, EMULSION INTRAVENOUS AS NEEDED
Status: DISCONTINUED | OUTPATIENT
Start: 2020-03-11 | End: 2020-03-11 | Stop reason: SURG

## 2020-03-11 RX ORDER — BIOTIN 5 MG
TABLET ORAL DAILY
COMMUNITY
End: 2021-12-22

## 2020-03-11 RX ORDER — SODIUM CHLORIDE, SODIUM LACTATE, POTASSIUM CHLORIDE, CALCIUM CHLORIDE 600; 310; 30; 20 MG/100ML; MG/100ML; MG/100ML; MG/100ML
75 INJECTION, SOLUTION INTRAVENOUS CONTINUOUS
Status: DISCONTINUED | OUTPATIENT
Start: 2020-03-11 | End: 2020-03-15 | Stop reason: HOSPADM

## 2020-03-11 RX ADMIN — PROPOFOL 120 MG: 10 INJECTION, EMULSION INTRAVENOUS at 11:09

## 2020-03-11 RX ADMIN — SODIUM CHLORIDE, SODIUM LACTATE, POTASSIUM CHLORIDE, AND CALCIUM CHLORIDE: .6; .31; .03; .02 INJECTION, SOLUTION INTRAVENOUS at 11:06

## 2020-03-11 RX ADMIN — PROPOFOL 30 MG: 10 INJECTION, EMULSION INTRAVENOUS at 11:12

## 2020-03-11 RX ADMIN — PROPOFOL 50 MG: 10 INJECTION, EMULSION INTRAVENOUS at 11:19

## 2020-03-11 RX ADMIN — PROPOFOL 50 MG: 10 INJECTION, EMULSION INTRAVENOUS at 11:15

## 2020-03-11 RX ADMIN — SODIUM CHLORIDE, SODIUM LACTATE, POTASSIUM CHLORIDE, AND CALCIUM CHLORIDE 75 ML/HR: .6; .31; .03; .02 INJECTION, SOLUTION INTRAVENOUS at 10:51

## 2020-03-11 NOTE — H&P
History and Physical - [facility]  Murray Quiles 76 y o  male MRN: [id number]        HPI:  72-year-old male with history hypertension, hyperlipidemia had colon polyps before  Good appetite, no recent weight loss  Regular bowel movements and denies any blood or mucus in the stool  Historical Information   Past Medical History:   Diagnosis Date    Arthritis     BPH (benign prostatic hypertrophy)     Hx post laser TURP    GERD (gastroesophageal reflux disease)     H/O exercise stress test     Hyperlipidemia     Hypertension     Morbid obesity ([address] )     Last Assessed:12/21/2016 ;     Plantar fascial fibromatosis     Onset:1/23/2012    PONV (postoperative nausea and vomiting)     Sleep apnea     does not wear CPAP     Past Surgical History:   Procedure Laterality Date    COLONOSCOPY      COLONOSCOPY N/A 2/22/2017    Procedure: COLONOSCOPY;  Surgeon: Derek Siddiqui MD;  Location: [place] [address] GI LAB; Service:     ESOPHAGOGASTRODUODENOSCOPY N/A 2/22/2017    Procedure: ESOPHAGOGASTRODUODENOSCOPY (EGD); Surgeon: Derek Siddiqui MD;  Location: [facility]; Service:    [address] [phone]    umbilical    KNEE ARTHROSCOPY Left     TONSILLECTOMY      TRANSURETHRAL RESECTION OF PROSTATE       Social History   Social History     Substance and Sexual Activity   Alcohol Use Yes    Frequency: 2-4 times a month    Comment: rarely     Social History     Substance and Sexual Activity   Drug Use No     Social History     Tobacco Use   Smoking Status Never Smoker   Smokeless Tobacco Never Used     Family History   Problem Relation Age of Onset    Diabetes Mother     Arthritis Mother     Hypertension Mother     Liver disease Mother     No Known Problems Daughter     No Known Problems Son     No Known Problems Son        Meds/Allergies       (Not in a hospital admission)    Allergies   Allergen Reactions    Sulfa Antibiotics Hives     Reaction Date: [id number];         Objective     Blood pressure 138/73, pulse 74, temperature 97 5 °F (36 4 °C), temperature source Tympanic, resp  rate 18, height 5' 9" (1 753 m), weight 96 6 kg (213 lb), SpO2 95 %      PHYSICAL EXAM:    Gen: NAD  CV: S1 & S2 normal, RRR  CHEST: Clear to auscultate  ABD: soft, NT/ND, good bowel sounds  EXT: no edema    ASSESSMENT:     History of colon polyps    PLAN:    Colonoscopy

## 2020-03-25 ENCOUNTER — TELEPHONE (OUTPATIENT)
Dept: GASTROENTEROLOGY | Facility: CLINIC | Age: 75
End: 2020-03-25

## 2020-03-25 NOTE — TELEPHONE ENCOUNTER
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

## 2020-03-31 ENCOUNTER — TELEMEDICINE (OUTPATIENT)
Dept: FAMILY MEDICINE CLINIC | Facility: CLINIC | Age: 75
End: 2020-03-31
Payer: COMMERCIAL

## 2020-03-31 DIAGNOSIS — B86 SCABIES: Primary | ICD-10-CM

## 2020-03-31 DIAGNOSIS — M25.50 ARTHRALGIA, UNSPECIFIED JOINT: ICD-10-CM

## 2020-03-31 DIAGNOSIS — I71.4 ABDOMINAL AORTIC ANEURYSM (AAA) WITHOUT RUPTURE (HCC): ICD-10-CM

## 2020-03-31 DIAGNOSIS — G47.33 OBSTRUCTIVE SLEEP APNEA: ICD-10-CM

## 2020-03-31 PROBLEM — L29.9 ITCHING: Status: RESOLVED | Noted: 2020-02-10 | Resolved: 2020-03-31

## 2020-03-31 PROBLEM — M54.2 NECK PAIN: Status: RESOLVED | Noted: 2019-12-24 | Resolved: 2020-03-31

## 2020-03-31 PROBLEM — R07.89 RIGHT-SIDED CHEST WALL PAIN: Status: RESOLVED | Noted: 2019-09-09 | Resolved: 2020-03-31
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
## 2020-03-31 PROCEDURE — 1160F RVW MEDS BY RX/DR IN RCRD: CPT | Performed by: FAMILY MEDICINE

## 2020-03-31 PROCEDURE — 99214 OFFICE O/P EST MOD 30 MIN: CPT | Performed by: FAMILY MEDICINE

## 2020-03-31 RX ORDER — PERMETHRIN 50 MG/G
CREAM TOPICAL
Qty: 60 G | Refills: 1 | Status: SHIPPED | OUTPATIENT
Start: 2020-03-31 | End: 2020-07-23

## 2020-03-31 NOTE — PROGRESS NOTES
Virtual Regular Visit    Problem List Items Addressed This Visit        Active Problems    Abdominal aortic aneurysm (AAA) without rupture (HCC)    Arthralgia    Relevant Orders    Ambulatory referral to Rheumatology    Obstructive sleep apnea      Other Visit Diagnoses     Scabies    -  Primary    Relevant Medications    permethrin (ELIMITE) 5 % cream        1 acute and 3 chronic conditions:    Rash, acute, ongoing  Allergy or derm if no better, trial of permethrin for possible scabies, home measures advised for prevention    CATHY per records/history but this was before his weight loss, retesting could be offered if needed but he is not on any cpap machine currently    Arthralgias, ongoing, labs for RA neg but consider seronegative type, offer rheumatology evaluation in future, could have OA also due to age and hx of obesity    AAA, yearly followup suggested, has US order, was 3 6 cm in April 2019, due next month       Reason for visit is rash and joint pains/stiffness    Encounter provider Ritchie Barahona DO    Provider located at [address]  [address] [place] [address]  DALY [address] [place] [id number]      Recent Visits  No visits were found meeting these conditions  Showing recent visits within past 7 days and meeting all other requirements     Today's Visits  Date Type Provider Dept   03/31/20 9[address] Rodri Torres today's visits and meeting all other requirements     Future Appointments  No visits were found meeting these conditions  Showing future appointments within next 150 days and meeting all other requirements        The patient was identified by name and date of birth  Laurel Ramos was informed that this is a telemedicine visit and that the visit is being conducted through [address] [place] [address] and patient was informed that this is not a secure, HIPAA-complaint platform  he agrees to proceed     My office door was closed  No one else was in the room  He acknowledged consent and understanding of privacy and security of the video platform  The patient has agreed to participate and understands they can discontinue the visit at any time  Patient is aware this is a billable service  Subjective  Laurel Ramos is a 76 y o  male   Still itchy  Using benadryl  Whole body  Not drowsy 3-4x/d of benadryl per pt  clarinex w/o help    No one else with same  Allergy testing reviewed    Has lost significant wt with healthy diet/exercise  As a result, he may not have CATHY anymore  No vertigo symptoms currently  He is off BP meds also  He is in process of applying for new life insurance    Past Medical History:   Diagnosis Date    Arthritis     BPH (benign prostatic hypertrophy)     Hx post laser TURP    GERD (gastroesophageal reflux disease)     H/O exercise stress test     Hyperlipidemia     Hypertension     Morbid obesity ([address] )     Last Assessed:12/21/2016 ;     Plantar fascial fibromatosis     Onset:1/23/2012    PONV (postoperative nausea and vomiting)     Sleep apnea     does not wear CPAP       Past Surgical History:   Procedure Laterality Date    COLONOSCOPY      COLONOSCOPY N/A 2/22/2017    Procedure: COLONOSCOPY;  Surgeon: Lico Billings MD;  Location: [facility] GI LAB; Service:     ESOPHAGOGASTRODUODENOSCOPY N/A 2/22/2017    Procedure: ESOPHAGOGASTRODUODENOSCOPY (EGD); Surgeon: Lico Billings MD;  Location: [facility] GI LAB;   Service:     HERNIA REPAIR  [id number]    umbilical    KNEE ARTHROSCOPY Left     TONSILLECTOMY      TRANSURETHRAL RESECTION OF PROSTATE         Current Outpatient Medications   Medication Sig Dispense Refill    Krill Oil 1000 MG CAPS Take by mouth daily      Na Sulfate-K Sulfate-Mg Sulf 17 5-3 13-1 6 GM/177ML SOLN Take 1 kit by mouth see administration instructions for 1 dose 2 Bottle 0    permethrin (ELIMITE) 5 % cream Apply to entire body surface, rinse off after 10 hours, repeat in 2 weeks if necessary 60 g 1     No current facility-administered medications for this visit  Allergies   Allergen Reactions    Sulfa Antibiotics Hives     Reaction Date: [id number]; Review of Systems   Constitutional: Negative for chills and fever  Musculoskeletal: Positive for arthralgias  Skin: Positive for rash  Physical Exam   Constitutional: He appears well-nourished  No distress  Eyes: No scleral icterus  Pulmonary/Chest: Effort normal  No stridor  No respiratory distress  Speaks full sentences   Neurological: He is alert  Skin: He is not diaphoretic  No pallor  Psychiatric: He has a normal mood and affect   His behavior is normal

## 2020-05-06 ENCOUNTER — TRANSCRIBE ORDERS (OUTPATIENT)
Dept: ADMINISTRATIVE | Facility: HOSPITAL | Age: 75
End: 2020-05-06

## 2020-05-06 ENCOUNTER — HOSPITAL ENCOUNTER (OUTPATIENT)
Dept: RADIOLOGY | Facility: HOSPITAL | Age: 75
Discharge: HOME | End: 2020-05-06
Attending: FAMILY MEDICINE
Payer: COMMERCIAL

## 2020-05-06 DIAGNOSIS — I71.4 ABDOMINAL AORTIC ANEURYSM (AAA) WITHOUT RUPTURE (HCC): ICD-10-CM

## 2020-05-06 PROCEDURE — 76775 US EXAM ABDO BACK WALL LIM: CPT

## 2020-05-18 ENCOUNTER — TELEMEDICINE (OUTPATIENT)
Dept: RHEUMATOLOGY | Facility: CLINIC | Age: 75
End: 2020-05-18
Payer: COMMERCIAL

## 2020-05-18 ENCOUNTER — TELEPHONE (OUTPATIENT)
Dept: FAMILY MEDICINE CLINIC | Facility: CLINIC | Age: 75
End: 2020-05-18

## 2020-05-18 ENCOUNTER — TELEPHONE (OUTPATIENT)
Dept: RHEUMATOLOGY | Facility: CLINIC | Age: 75
End: 2020-05-18

## 2020-05-18 DIAGNOSIS — M25.50 DIFFUSE ARTHRALGIA: Primary | ICD-10-CM

## 2020-05-18 DIAGNOSIS — M19.90 OSTEOARTHRITIS, UNSPECIFIED OSTEOARTHRITIS TYPE, UNSPECIFIED SITE: Primary | ICD-10-CM

## 2020-05-18 DIAGNOSIS — M15.0 PRIMARY GENERALIZED (OSTEO)ARTHRITIS: ICD-10-CM

## 2020-05-18 PROCEDURE — G2012 BRIEF CHECK IN BY MD/QHP: HCPCS | Performed by: INTERNAL MEDICINE

## 2020-05-19 RX ORDER — MELOXICAM 15 MG/1
15 TABLET ORAL DAILY PRN
Qty: 90 TABLET | Refills: 1 | Status: SHIPPED | OUTPATIENT
Start: 2020-05-19 | End: 2020-11-18

## 2020-05-28 ENCOUNTER — HOSPITAL ENCOUNTER (OUTPATIENT)
Dept: RADIOLOGY | Facility: HOSPITAL | Age: 75
Discharge: HOME | End: 2020-05-28
Attending: INTERNAL MEDICINE
Payer: COMMERCIAL

## 2020-05-28 DIAGNOSIS — M25.50 DIFFUSE ARTHRALGIA: ICD-10-CM

## 2020-05-28 DIAGNOSIS — M15.0 PRIMARY GENERALIZED (OSTEO)ARTHRITIS: ICD-10-CM

## 2020-05-28 PROCEDURE — 73130 X-RAY EXAM OF HAND: CPT

## 2020-05-28 PROCEDURE — 73522 X-RAY EXAM HIPS BI 3-4 VIEWS: CPT

## 2020-06-01 LAB
ALBUMIN SERPL-MCNC: 4 G/DL (ref 3.7–4.7)
ALBUMIN/GLOB SERPL: 1.7 {RATIO} (ref 1.2–2.2)
ALP SERPL-CCNC: 69 IU/L (ref 39–117)
ALT SERPL-CCNC: 14 IU/L (ref 0–44)
AST SERPL-CCNC: 15 IU/L (ref 0–40)
BILIRUB SERPL-MCNC: 0.3 MG/DL (ref 0–1.2)
BUN SERPL-MCNC: 28 MG/DL (ref 8–27)
BUN/CREAT SERPL: 33 (ref 10–24)
CALCIUM SERPL-MCNC: 9 MG/DL (ref 8.6–10.2)
CCP IGA+IGG SERPL IA-ACNC: 4 UNITS (ref 0–19)
CHLORIDE SERPL-SCNC: 102 MMOL/L (ref 96–106)
CK SERPL-CCNC: 60 U/L (ref 41–331)
CO2 SERPL-SCNC: 23 MMOL/L (ref 20–29)
CREAT SERPL-MCNC: 0.85 MG/DL (ref 0.76–1.27)
CRP SERPL-MCNC: 1 MG/L (ref 0–10)
ERYTHROCYTE [SEDIMENTATION RATE] IN BLOOD BY WESTERGREN METHOD: 9 MM/HR (ref 0–30)
GLOBULIN SER-MCNC: 2.3 G/DL (ref 1.5–4.5)
GLUCOSE SERPL-MCNC: 88 MG/DL (ref 65–99)
HAV AB SER QL IA: NEGATIVE
HBV CORE AB SERPL QL IA: NEGATIVE
HBV SURFACE AB SER QL: NON REACTIVE
HBV SURFACE AG SERPL QL IA: NEGATIVE
HCV AB S/CO SERPL IA: <0.1 S/CO RATIO (ref 0–0.9)
POTASSIUM SERPL-SCNC: 4.5 MMOL/L (ref 3.5–5.2)
PROT SERPL-MCNC: 6.3 G/DL (ref 6–8.5)
SL AMB COMMENTS: NORMAL
SL AMB EGFR AFRICAN AMERICAN: 99 ML/MIN/1.73
SL AMB EGFR NON AFRICAN AMERICAN: 86 ML/MIN/1.73
SODIUM SERPL-SCNC: 138 MMOL/L (ref 134–144)
URATE SERPL-MCNC: 3.7 MG/DL (ref 3.7–8.6)

## 2020-06-03 ENCOUNTER — TELEPHONE (OUTPATIENT)
Dept: OBGYN CLINIC | Facility: HOSPITAL | Age: 75
End: 2020-06-03

## 2020-07-24 ENCOUNTER — OFFICE VISIT (OUTPATIENT)
Dept: FAMILY MEDICINE CLINIC | Facility: CLINIC | Age: 75
End: 2020-07-24
Payer: COMMERCIAL

## 2020-07-24 VITALS
TEMPERATURE: 98 F | HEIGHT: 69 IN | WEIGHT: 220.4 LBS | DIASTOLIC BLOOD PRESSURE: 70 MMHG | SYSTOLIC BLOOD PRESSURE: 126 MMHG | HEART RATE: 62 BPM | RESPIRATION RATE: 18 BRPM | BODY MASS INDEX: 32.64 KG/M2

## 2020-07-24 DIAGNOSIS — E66.9 OBESITY (BMI 30-39.9): ICD-10-CM

## 2020-07-24 DIAGNOSIS — I71.4 ABDOMINAL AORTIC ANEURYSM (AAA) WITHOUT RUPTURE (HCC): Primary | ICD-10-CM

## 2020-07-24 DIAGNOSIS — K76.0 FATTY LIVER: ICD-10-CM

## 2020-07-24 DIAGNOSIS — J98.4 RESTRICTIVE LUNG DISEASE: ICD-10-CM

## 2020-07-24 DIAGNOSIS — M25.561 ARTHRALGIA OF RIGHT KNEE: ICD-10-CM

## 2020-07-24 DIAGNOSIS — G47.33 OBSTRUCTIVE SLEEP APNEA: ICD-10-CM

## 2020-07-24 PROCEDURE — 99214 OFFICE O/P EST MOD 30 MIN: CPT | Performed by: FAMILY MEDICINE

## 2020-07-24 PROCEDURE — 3078F DIAST BP <80 MM HG: CPT | Performed by: FAMILY MEDICINE

## 2020-07-24 PROCEDURE — 1160F RVW MEDS BY RX/DR IN RCRD: CPT | Performed by: FAMILY MEDICINE

## 2020-07-24 PROCEDURE — 1036F TOBACCO NON-USER: CPT | Performed by: FAMILY MEDICINE

## 2020-07-24 PROCEDURE — 3008F BODY MASS INDEX DOCD: CPT | Performed by: FAMILY MEDICINE

## 2020-07-24 PROCEDURE — 3074F SYST BP LT 130 MM HG: CPT | Performed by: FAMILY MEDICINE

## 2020-07-24 NOTE — PROGRESS NOTES
Assessment/Plan:    No problem-specific Assessment & Plan notes found for this encounter  Records reviewed    CAD per cardiologist's note, did have normal stress test but calcium on coronary CT  Copies of consult given    arthragia knee stable on mobic    CATHY, pt accepts dx but refuses tx or referral for eval    AAA aware, needs yearly f/u, copies of report given    Restrictive airways probably due to abdominal obesity, seen on PFT, copy of report given to pt    Enlarged fatty liver on US, normal LFTs, has had wt loss, condition probably remains, copy of report given    Suspect elevated hgb due to untreated CATHY but he could always get a hematologist referral, declines     Diagnoses and all orders for this visit:    Abdominal aortic aneurysm (AAA) without rupture (HCC)    Arthralgia of right knee    Obstructive sleep apnea    Fatty liver    Restrictive lung disease    Obesity (BMI 30-39  9)      BMI Counseling: Body mass index is 32 55 kg/m²  The BMI is above normal  Nutrition recommendations include decreasing portion sizes and moderation in carbohydrate intake  Exercise recommendations include exercising 3-5 times per week  No pharmacotherapy was ordered  Return if symptoms worsen or fail to improve  Subjective:      Patient ID: Silke Cunha is a 76 y o  male  No chief complaint on file  HPI    Denied by life insurance    Hb 17 1  No tob, no copd or asthma  Has dx of CATHY, was given cpap but he declined    Told he had CAD  No hx of cardiac cath    The following portions of the patient's history were reviewed and updated as appropriate: allergies, current medications, past family history, past medical history, past social history, past surgical history and problem list     Review of Systems   Constitutional: Negative for fever  Respiratory: Negative for shortness of breath  Cardiovascular: Negative for chest pain and leg swelling           Current Outpatient Medications   Medication Sig Dispense Refill    meloxicam (MOBIC) 15 mg tablet Take 1 tablet (15 mg total) by mouth daily as needed for moderate pain 90 tablet 1    Krill Oil 1000 MG CAPS Take by mouth daily       No current facility-administered medications for this visit  Objective:    /70   Pulse 62   Temp 98 °F (36 7 °C)   Resp 18   Ht 5' 9" (1 753 m)   Wt 100 kg (220 lb 6 4 oz)   BMI 32 55 kg/m²        Physical Exam   Constitutional: He appears well-developed  No distress  HENT:   Head: Normocephalic  Right Ear: External ear normal    Left Ear: External ear normal    Mouth/Throat: Oropharynx is clear and moist  No oropharyngeal exudate  Eyes: Conjunctivae are normal  No scleral icterus  Neck: Neck supple  No thyromegaly present  Cardiovascular: Normal rate, regular rhythm and intact distal pulses  Pulmonary/Chest: Effort normal and breath sounds normal  No respiratory distress  He has no wheezes  Abdominal: Soft  Bowel sounds are normal  He exhibits distension  There is no tenderness  Musculoskeletal: He exhibits no edema or deformity  Lymphadenopathy:     He has no cervical adenopathy  Neurological: He is alert  He exhibits normal muscle tone  Skin: Skin is warm and dry  No rash noted  No pallor  Psychiatric: His behavior is normal  Thought content normal    Nursing note and vitals reviewed               Dmitriy Malloy DO

## 2020-11-18 DIAGNOSIS — M19.90 OSTEOARTHRITIS, UNSPECIFIED OSTEOARTHRITIS TYPE, UNSPECIFIED SITE: ICD-10-CM

## 2020-11-18 RX ORDER — MELOXICAM 15 MG/1
15 TABLET ORAL DAILY PRN
Qty: 90 TABLET | Refills: 1 | Status: SHIPPED | OUTPATIENT
Start: 2020-11-18 | End: 2021-03-26

## 2021-02-04 ENCOUNTER — OFFICE VISIT (OUTPATIENT)
Dept: FAMILY MEDICINE CLINIC | Facility: CLINIC | Age: 76
End: 2021-02-04
Payer: COMMERCIAL

## 2021-02-04 VITALS
HEIGHT: 69 IN | SYSTOLIC BLOOD PRESSURE: 146 MMHG | WEIGHT: 235 LBS | BODY MASS INDEX: 34.8 KG/M2 | HEART RATE: 88 BPM | TEMPERATURE: 97.6 F | RESPIRATION RATE: 18 BRPM | DIASTOLIC BLOOD PRESSURE: 80 MMHG

## 2021-02-04 DIAGNOSIS — R31.0 GROSS HEMATURIA: Primary | ICD-10-CM

## 2021-02-04 LAB
SL AMB  POCT GLUCOSE, UA: NORMAL
SL AMB LEUKOCYTE ESTERASE,UA: NORMAL
SL AMB POCT BILIRUBIN,UA: NORMAL
SL AMB POCT BLOOD,UA: NORMAL
SL AMB POCT CLARITY,UA: CLEAR
SL AMB POCT COLOR,UA: YELLOW
SL AMB POCT KETONES,UA: NORMAL
SL AMB POCT NITRITE,UA: NORMAL
SL AMB POCT PH,UA: 6.5
SL AMB POCT SPECIFIC GRAVITY,UA: 1.03
SL AMB POCT URINE PROTEIN: NORMAL
SL AMB POCT UROBILINOGEN: 0.2

## 2021-02-04 PROCEDURE — 1036F TOBACCO NON-USER: CPT | Performed by: FAMILY MEDICINE

## 2021-02-04 PROCEDURE — 81003 URINALYSIS AUTO W/O SCOPE: CPT | Performed by: FAMILY MEDICINE

## 2021-02-04 PROCEDURE — 3725F SCREEN DEPRESSION PERFORMED: CPT | Performed by: FAMILY MEDICINE

## 2021-02-04 PROCEDURE — 3288F FALL RISK ASSESSMENT DOCD: CPT | Performed by: FAMILY MEDICINE

## 2021-02-04 PROCEDURE — 1101F PT FALLS ASSESS-DOCD LE1/YR: CPT | Performed by: FAMILY MEDICINE

## 2021-02-04 PROCEDURE — 1160F RVW MEDS BY RX/DR IN RCRD: CPT | Performed by: FAMILY MEDICINE

## 2021-02-04 PROCEDURE — 99213 OFFICE O/P EST LOW 20 MIN: CPT | Performed by: FAMILY MEDICINE

## 2021-02-04 NOTE — PROGRESS NOTES
Assessment/Plan:    1  Gross hematuria  -     POCT urine dip auto non-scope  -     Urine culture  -     US kidney and bladder; Future; Expected date: 02/04/2021    Will engage in work up  Pt advised of possibilities of painless hematuria  Will foillow results          There are no Patient Instructions on file for this visit  Return for Next scheduled follow up  Subjective:      Patient ID: Serafin Cheng is a 76 y o  male  Chief Complaint   Patient presents with    Blood in Urine     lj       Pt has an appt today for blood in his urine - states it happened two months ago and recently again  Last time it happened was last week  He noticed it at the very end of urination, bright red drops   No pain  No dysuria no discharge      The following portions of the patient's history were reviewed and updated as appropriate: allergies, current medications, past family history, past medical history, past social history, past surgical history and problem list     Review of Systems   Constitutional: Negative for activity change, appetite change, chills, diaphoresis, fatigue, fever and unexpected weight change  HENT: Negative for congestion, dental problem, ear pain, mouth sores, sinus pressure, sinus pain, sore throat and trouble swallowing  Eyes: Negative for photophobia, discharge and itching  Respiratory: Negative for apnea, chest tightness and shortness of breath  Cardiovascular: Negative for chest pain, palpitations and leg swelling  Gastrointestinal: Negative for abdominal distention, abdominal pain, blood in stool, nausea and vomiting  Endocrine: Negative for cold intolerance, heat intolerance, polydipsia, polyphagia and polyuria  Genitourinary: Positive for hematuria  Negative for difficulty urinating  Musculoskeletal: Negative for arthralgias  Skin: Negative for color change and wound  Neurological: Negative for dizziness, syncope, speech difficulty and headaches     Hematological: Negative for adenopathy  Psychiatric/Behavioral: Negative for agitation and behavioral problems  Current Outpatient Medications   Medication Sig Dispense Refill    Krill Oil 1000 MG CAPS Take by mouth daily      meloxicam (MOBIC) 15 mg tablet TAKE 1 TABLET (15 MG TOTAL) BY MOUTH DAILY AS NEEDED FOR MODERATE PAIN 90 tablet 1     No current facility-administered medications for this visit  Objective:    /80   Pulse 88   Temp 97 6 °F (36 4 °C)   Resp 18   Ht 5' 9" (1 753 m)   Wt 107 kg (235 lb)   BMI 34 70 kg/m²        Physical Exam  Vitals signs and nursing note reviewed  Constitutional:       General: He is not in acute distress  Appearance: He is well-developed  He is not diaphoretic  HENT:      Head: Normocephalic and atraumatic  Right Ear: External ear normal       Left Ear: External ear normal       Nose: Nose normal       Mouth/Throat:      Pharynx: No oropharyngeal exudate  Eyes:      General: No scleral icterus  Right eye: No discharge  Left eye: No discharge  Pupils: Pupils are equal, round, and reactive to light  Neck:      Thyroid: No thyromegaly  Cardiovascular:      Rate and Rhythm: Normal rate  Heart sounds: Normal heart sounds  No murmur  Pulmonary:      Effort: Pulmonary effort is normal  No respiratory distress  Breath sounds: Normal breath sounds  No wheezing  Abdominal:      General: Bowel sounds are normal  There is no distension  Palpations: Abdomen is soft  There is no mass  Tenderness: There is no abdominal tenderness  There is no guarding or rebound  Musculoskeletal: Normal range of motion  Skin:     General: Skin is warm and dry  Findings: No erythema or rash  Neurological:      Mental Status: He is alert        Coordination: Coordination normal       Deep Tendon Reflexes: Reflexes normal    Psychiatric:         Behavior: Behavior normal                 Virgene Fat, DO

## 2021-02-06 LAB
BACTERIA UR CULT: NORMAL
Lab: NO GROWTH

## 2021-02-10 ENCOUNTER — HOSPITAL ENCOUNTER (OUTPATIENT)
Dept: RADIOLOGY | Facility: HOSPITAL | Age: 76
Discharge: HOME | End: 2021-02-10
Attending: FAMILY MEDICINE
Payer: COMMERCIAL

## 2021-02-10 DIAGNOSIS — R31.0 GROSS HEMATURIA: ICD-10-CM

## 2021-02-10 PROCEDURE — 76770 US EXAM ABDO BACK WALL COMP: CPT

## 2021-02-18 ENCOUNTER — TELEPHONE (OUTPATIENT)
Dept: FAMILY MEDICINE CLINIC | Facility: CLINIC | Age: 76
End: 2021-02-18

## 2021-02-18 DIAGNOSIS — R31.0 GROSS HEMATURIA: Primary | ICD-10-CM

## 2021-02-18 DIAGNOSIS — N28.1 RENAL CYST, RIGHT: ICD-10-CM

## 2021-02-18 NOTE — TELEPHONE ENCOUNTER
Spoke with pt about the results of the US and the urine culture  It was suggetsed by radiology to do ct with renal protocol for the renal cyst seen on the rt    This was explained to the pt will follow

## 2021-02-22 ENCOUNTER — TELEPHONE (OUTPATIENT)
Dept: FAMILY MEDICINE CLINIC | Facility: CLINIC | Age: 76
End: 2021-02-22

## 2021-02-22 NOTE — TELEPHONE ENCOUNTER
PT states he will call us back but he wants to know if he should really go for a CT SCAN   Pls wait for him to call back with more information

## 2021-02-22 NOTE — TELEPHONE ENCOUNTER
A cyst was detected through an [address]  He had an US done by his Suanne Balwinder Mak and they are sending the results over to our office  PT is concerned about the CT scan that he concerned about his insurance will have to pay for this  He is trying to save them some money  Pt is looking for clarification if he should follow through with the CT scan   Pls advise

## 2021-02-24 NOTE — TELEPHONE ENCOUNTER
Reviewed urology note and renal cyst description  Advised pt that since urine c/s neg and had episode of gross hematuria, he may want to see his urologist instead of doing the CT scan to decide if cystoscopy is warranted or CT necessary  He understands

## 2021-02-25 ENCOUNTER — TELEPHONE (OUTPATIENT)
Dept: FAMILY MEDICINE CLINIC | Facility: CLINIC | Age: 76
End: 2021-02-25

## 2021-02-25 DIAGNOSIS — N28.1 RENAL CYST, RIGHT: ICD-10-CM

## 2021-02-25 DIAGNOSIS — R31.0 GROSS HEMATURIA: Primary | ICD-10-CM

## 2021-02-25 NOTE — TELEPHONE ENCOUNTER
Cat Scan at [facility] called stating pt is scheduled for CT scan at [place]  She says he needs to have B1 and Creatinine labs before appt  Wants us to either cancel appt or order labs and have patient go before appt at 2pm today  Pls advise with pt

## 2021-02-25 NOTE — TELEPHONE ENCOUNTER
Called pt to advise to go for the labs   Pt stated he will not be going for his ct today, he spoke with dr Sammy Roe and will be going to the urologist first  [facility]

## 2021-03-02 ENCOUNTER — TRANSCRIBE ORDERS (OUTPATIENT)
Dept: ADMINISTRATIVE | Facility: HOSPITAL | Age: 76
End: 2021-03-02

## 2021-03-02 DIAGNOSIS — R31.0 GROSS HEMATURIA: Primary | ICD-10-CM

## 2021-03-09 ENCOUNTER — HOSPITAL ENCOUNTER (OUTPATIENT)
Dept: RADIOLOGY | Facility: HOSPITAL | Age: 76
Discharge: HOME | End: 2021-03-09
Payer: COMMERCIAL

## 2021-03-09 DIAGNOSIS — R31.0 GROSS HEMATURIA: ICD-10-CM

## 2021-03-09 PROCEDURE — 74178 CT ABD&PLV WO CNTR FLWD CNTR: CPT

## 2021-03-09 PROCEDURE — G1004 CDSM NDSC: HCPCS

## 2021-03-09 RX ADMIN — IOHEXOL 100 ML: 350 INJECTION, SOLUTION INTRAVENOUS at 13:29

## 2021-03-10 ENCOUNTER — TELEPHONE (OUTPATIENT)
Dept: FAMILY MEDICINE CLINIC | Facility: CLINIC | Age: 76
End: 2021-03-10

## 2021-03-10 NOTE — TELEPHONE ENCOUNTER
Pt states he went for blood work on Saturday, 3/6/21 and we should have received results on Monday  He states he needed this for his CT scan which he had done yesterday  Do not see in chart yet, pls check solarity and let patient know

## 2021-03-10 NOTE — TELEPHONE ENCOUNTER
Checked [facility], nothing found   We have to call the patient to confirm which lab he hossein to   Kenney Lr MA

## 2021-03-12 NOTE — TELEPHONE ENCOUNTER
Results received  The labs were actually ordered by his Urologist Dr Eva Salvador, not us  Though the results are normal, he should call his urologist for any questions he has regarding the results

## 2021-03-25 DIAGNOSIS — M19.90 OSTEOARTHRITIS, UNSPECIFIED OSTEOARTHRITIS TYPE, UNSPECIFIED SITE: ICD-10-CM

## 2021-03-26 RX ORDER — MELOXICAM 15 MG/1
15 TABLET ORAL DAILY PRN
Qty: 90 TABLET | Refills: 1 | Status: SHIPPED | OUTPATIENT
Start: 2021-03-26 | End: 2021-10-05

## 2021-06-08 ENCOUNTER — TELEPHONE (OUTPATIENT)
Dept: FAMILY MEDICINE CLINIC | Facility: CLINIC | Age: 76
End: 2021-06-08

## 2021-06-08 DIAGNOSIS — M15.9 PRIMARY OSTEOARTHRITIS INVOLVING MULTIPLE JOINTS: Primary | ICD-10-CM

## 2021-06-08 NOTE — TELEPHONE ENCOUNTER
Pt called, stated he is an the physical therapy office now and needs an order faxed to [phone]  Pt stated he talked to Dr Sukhwinder Palm regarding this   "For my entire body"  [facility] [address]

## 2021-10-05 DIAGNOSIS — M19.90 OSTEOARTHRITIS, UNSPECIFIED OSTEOARTHRITIS TYPE, UNSPECIFIED SITE: ICD-10-CM

## 2021-10-05 RX ORDER — MELOXICAM 15 MG/1
15 TABLET ORAL DAILY PRN
Qty: 20 TABLET | Refills: 0 | Status: SHIPPED | OUTPATIENT
Start: 2021-10-05 | End: 2021-10-28

## 2021-10-27 DIAGNOSIS — M19.90 OSTEOARTHRITIS, UNSPECIFIED OSTEOARTHRITIS TYPE, UNSPECIFIED SITE: ICD-10-CM

## 2021-10-28 RX ORDER — MELOXICAM 15 MG/1
15 TABLET ORAL DAILY PRN
Qty: 30 TABLET | Refills: 2 | Status: SHIPPED | OUTPATIENT
Start: 2021-10-28 | End: 2021-11-27

## 2021-11-08 ENCOUNTER — OFFICE VISIT (OUTPATIENT)
Dept: FAMILY MEDICINE CLINIC | Facility: CLINIC | Age: 76
End: 2021-11-08
Payer: COMMERCIAL

## 2021-11-08 VITALS
SYSTOLIC BLOOD PRESSURE: 124 MMHG | HEIGHT: 69 IN | BODY MASS INDEX: 35.4 KG/M2 | OXYGEN SATURATION: 95 % | TEMPERATURE: 97.7 F | RESPIRATION RATE: 16 BRPM | WEIGHT: 239 LBS | DIASTOLIC BLOOD PRESSURE: 82 MMHG | HEART RATE: 81 BPM

## 2021-11-08 DIAGNOSIS — Z13.6 SCREENING FOR CARDIOVASCULAR, RESPIRATORY, AND GENITOURINARY DISEASES: ICD-10-CM

## 2021-11-08 DIAGNOSIS — G47.33 OBSTRUCTIVE SLEEP APNEA: ICD-10-CM

## 2021-11-08 DIAGNOSIS — Z13.89 SCREENING FOR CARDIOVASCULAR, RESPIRATORY, AND GENITOURINARY DISEASES: ICD-10-CM

## 2021-11-08 DIAGNOSIS — I71.4 ABDOMINAL AORTIC ANEURYSM (AAA) WITHOUT RUPTURE (HCC): ICD-10-CM

## 2021-11-08 DIAGNOSIS — Z00.00 HEALTHCARE MAINTENANCE: Primary | ICD-10-CM

## 2021-11-08 DIAGNOSIS — Z13.83 SCREENING FOR CARDIOVASCULAR, RESPIRATORY, AND GENITOURINARY DISEASES: ICD-10-CM

## 2021-11-08 DIAGNOSIS — E66.9 OBESITY (BMI 30-39.9): ICD-10-CM

## 2021-11-08 DIAGNOSIS — Z23 IMMUNIZATION DUE: ICD-10-CM

## 2021-11-08 DIAGNOSIS — Z13.1 SCREENING FOR DIABETES MELLITUS (DM): ICD-10-CM

## 2021-11-08 PROCEDURE — 99397 PER PM REEVAL EST PAT 65+ YR: CPT | Performed by: FAMILY MEDICINE

## 2021-11-08 PROCEDURE — 1036F TOBACCO NON-USER: CPT | Performed by: FAMILY MEDICINE

## 2021-11-08 PROCEDURE — 1160F RVW MEDS BY RX/DR IN RCRD: CPT | Performed by: FAMILY MEDICINE

## 2021-11-08 PROCEDURE — 3725F SCREEN DEPRESSION PERFORMED: CPT | Performed by: FAMILY MEDICINE

## 2021-11-08 PROCEDURE — 90471 IMMUNIZATION ADMIN: CPT

## 2021-11-08 PROCEDURE — 90715 TDAP VACCINE 7 YRS/> IM: CPT

## 2021-11-09 ENCOUNTER — TELEPHONE (OUTPATIENT)
Dept: FAMILY MEDICINE CLINIC | Facility: CLINIC | Age: 76
End: 2021-11-09

## 2021-11-19 LAB
ALBUMIN SERPL-MCNC: 4.2 G/DL (ref 3.7–4.7)
ALBUMIN/GLOB SERPL: 1.6 {RATIO} (ref 1.2–2.2)
ALP SERPL-CCNC: 74 IU/L (ref 44–121)
ALT SERPL-CCNC: 12 IU/L (ref 0–44)
AST SERPL-CCNC: 16 IU/L (ref 0–40)
BILIRUB SERPL-MCNC: 0.6 MG/DL (ref 0–1.2)
BUN SERPL-MCNC: 16 MG/DL (ref 8–27)
BUN/CREAT SERPL: 15 (ref 10–24)
CALCIUM SERPL-MCNC: 9.6 MG/DL (ref 8.6–10.2)
CHLORIDE SERPL-SCNC: 99 MMOL/L (ref 96–106)
CHOLEST SERPL-MCNC: 140 MG/DL (ref 100–199)
CO2 SERPL-SCNC: 25 MMOL/L (ref 20–29)
CREAT SERPL-MCNC: 1.04 MG/DL (ref 0.76–1.27)
GLOBULIN SER-MCNC: 2.7 G/DL (ref 1.5–4.5)
GLUCOSE SERPL-MCNC: 108 MG/DL (ref 65–99)
HDLC SERPL-MCNC: 41 MG/DL
LDLC SERPL CALC-MCNC: 78 MG/DL (ref 0–99)
MICRODELETION SYND BLD/T FISH: NORMAL
POTASSIUM SERPL-SCNC: 4.4 MMOL/L (ref 3.5–5.2)
PROT SERPL-MCNC: 6.9 G/DL (ref 6–8.5)
SL AMB EGFR AFRICAN AMERICAN: 81 ML/MIN/1.73
SL AMB EGFR NON AFRICAN AMERICAN: 70 ML/MIN/1.73
SODIUM SERPL-SCNC: 137 MMOL/L (ref 134–144)
TRIGL SERPL-MCNC: 114 MG/DL (ref 0–149)

## 2021-11-26 DIAGNOSIS — M19.90 OSTEOARTHRITIS, UNSPECIFIED OSTEOARTHRITIS TYPE, UNSPECIFIED SITE: ICD-10-CM

## 2021-11-27 RX ORDER — MELOXICAM 15 MG/1
TABLET ORAL
Qty: 30 TABLET | Refills: 2 | Status: SHIPPED | OUTPATIENT
Start: 2021-11-27 | End: 2021-12-22

## 2021-12-03 ENCOUNTER — HOSPITAL ENCOUNTER (OUTPATIENT)
Dept: RADIOLOGY | Facility: HOSPITAL | Age: 76
Discharge: HOME | End: 2021-12-03
Attending: FAMILY MEDICINE
Payer: COMMERCIAL

## 2021-12-03 DIAGNOSIS — I71.4 ABDOMINAL AORTIC ANEURYSM (AAA) WITHOUT RUPTURE (HCC): ICD-10-CM

## 2021-12-03 PROCEDURE — 76775 US EXAM ABDO BACK WALL LIM: CPT

## 2021-12-08 ENCOUNTER — TELEMEDICINE (OUTPATIENT)
Dept: FAMILY MEDICINE CLINIC | Facility: CLINIC | Age: 76
End: 2021-12-08
Payer: COMMERCIAL

## 2021-12-08 ENCOUNTER — TELEPHONE (OUTPATIENT)
Dept: FAMILY MEDICINE CLINIC | Facility: CLINIC | Age: 76
End: 2021-12-08

## 2021-12-08 ENCOUNTER — HOSPITAL ENCOUNTER (EMERGENCY)
Facility: HOSPITAL | Age: 76
Discharge: LEFT BEFORE DISCHARGE | End: 2021-12-08
Payer: COMMERCIAL

## 2021-12-08 VITALS
DIASTOLIC BLOOD PRESSURE: 91 MMHG | WEIGHT: 235 LBS | OXYGEN SATURATION: 96 % | HEART RATE: 115 BPM | SYSTOLIC BLOOD PRESSURE: 154 MMHG | HEIGHT: 69 IN | TEMPERATURE: 99.9 F | BODY MASS INDEX: 34.8 KG/M2 | RESPIRATION RATE: 18 BRPM

## 2021-12-08 DIAGNOSIS — Z11.9 ENCOUNTER FOR SCREENING FOR INFECTIOUS AND PARASITIC DISEASES, UNSPECIFIED: Primary | ICD-10-CM

## 2021-12-08 DIAGNOSIS — B34.9 VIRAL INFECTION, UNSPECIFIED: Primary | ICD-10-CM

## 2021-12-08 LAB
FLUAV RNA RESP QL NAA+PROBE: NEGATIVE
FLUBV RNA RESP QL NAA+PROBE: NEGATIVE
RSV RNA RESP QL NAA+PROBE: NEGATIVE
SARS-COV-2 RNA RESP QL NAA+PROBE: POSITIVE

## 2021-12-08 PROCEDURE — 1160F RVW MEDS BY RX/DR IN RCRD: CPT | Performed by: FAMILY MEDICINE

## 2021-12-08 PROCEDURE — 99213 OFFICE O/P EST LOW 20 MIN: CPT | Performed by: FAMILY MEDICINE

## 2021-12-08 PROCEDURE — 1036F TOBACCO NON-USER: CPT | Performed by: FAMILY MEDICINE

## 2021-12-08 PROCEDURE — 0241U HB NFCT DS VIR RESP RNA 4 TRGT: CPT

## 2021-12-09 DIAGNOSIS — U07.1 COVID-19 VIRUS INFECTION: Primary | ICD-10-CM

## 2021-12-09 RX ORDER — BENZONATATE 200 MG/1
200 CAPSULE ORAL 3 TIMES DAILY PRN
Qty: 40 CAPSULE | Refills: 0 | Status: SHIPPED | OUTPATIENT
Start: 2021-12-09

## 2021-12-09 RX ORDER — ALBUTEROL SULFATE 90 UG/1
2 AEROSOL, METERED RESPIRATORY (INHALATION) EVERY 6 HOURS PRN
Qty: 18 G | Refills: 8 | Status: SHIPPED | OUTPATIENT
Start: 2021-12-09

## 2021-12-09 RX ORDER — METHYLPREDNISOLONE 4 MG/1
TABLET ORAL
Qty: 21 TABLET | Refills: 0 | Status: SHIPPED | OUTPATIENT
Start: 2021-12-09 | End: 2021-12-17 | Stop reason: HOSPADM

## 2021-12-13 ENCOUNTER — HOSPITAL ENCOUNTER (INPATIENT)
Facility: HOSPITAL | Age: 76
LOS: 4 days | Discharge: HOME | DRG: 177 | End: 2021-12-17
Attending: EMERGENCY MEDICINE | Admitting: INTERNAL MEDICINE
Payer: COMMERCIAL

## 2021-12-13 ENCOUNTER — APPOINTMENT (EMERGENCY)
Dept: RADIOLOGY | Facility: HOSPITAL | Age: 76
DRG: 177 | End: 2021-12-13
Payer: COMMERCIAL

## 2021-12-13 DIAGNOSIS — R09.02 HYPOXIA: Primary | ICD-10-CM

## 2021-12-13 DIAGNOSIS — U07.1 COVID-19: ICD-10-CM

## 2021-12-13 DIAGNOSIS — R53.1 WEAKNESS: ICD-10-CM

## 2021-12-13 PROBLEM — J96.01 ACUTE RESPIRATORY FAILURE WITH HYPOXIA (HCC): Status: ACTIVE | Noted: 2021-12-13

## 2021-12-13 LAB
ALBUMIN SERPL BCP-MCNC: 3.3 G/DL (ref 3.5–5)
ALP SERPL-CCNC: 61 U/L (ref 46–116)
ALT SERPL W P-5'-P-CCNC: 41 U/L (ref 12–78)
ANION GAP SERPL CALCULATED.3IONS-SCNC: 10 MMOL/L (ref 4–13)
APTT PPP: 35 SECONDS (ref 23–37)
AST SERPL W P-5'-P-CCNC: 35 U/L (ref 5–45)
ATRIAL RATE: 90 BPM
BASOPHILS # BLD AUTO: 0.02 THOUSANDS/ΜL (ref 0–0.1)
BASOPHILS NFR BLD AUTO: 0 % (ref 0–1)
BILIRUB SERPL-MCNC: 0.5 MG/DL (ref 0.2–1)
BUN SERPL-MCNC: 25 MG/DL (ref 5–25)
CALCIUM ALBUM COR SERPL-MCNC: 9 MG/DL (ref 8.3–10.1)
CALCIUM SERPL-MCNC: 8.4 MG/DL (ref 8.3–10.1)
CARDIAC TROPONIN I PNL SERPL HS: 12 NG/L
CHLORIDE SERPL-SCNC: 97 MMOL/L (ref 100–108)
CK SERPL-CCNC: 125 U/L (ref 39–308)
CO2 SERPL-SCNC: 27 MMOL/L (ref 21–32)
CREAT SERPL-MCNC: 1.33 MG/DL (ref 0.6–1.3)
CRP SERPL QL: 25.7 MG/L
D DIMER PPP FEU-MCNC: 0.94 UG/ML FEU
EOSINOPHIL # BLD AUTO: 0 THOUSAND/ΜL (ref 0–0.61)
EOSINOPHIL NFR BLD AUTO: 0 % (ref 0–6)
ERYTHROCYTE [DISTWIDTH] IN BLOOD BY AUTOMATED COUNT: 12.4 % (ref 11.6–15.1)
GFR SERPL CREATININE-BSD FRML MDRD: 52 ML/MIN/1.73SQ M
GLUCOSE SERPL-MCNC: 119 MG/DL (ref 65–140)
HCT VFR BLD AUTO: 52.7 % (ref 36.5–49.3)
HGB BLD-MCNC: 17.5 G/DL (ref 12–17)
IMM GRANULOCYTES # BLD AUTO: 0.04 THOUSAND/UL (ref 0–0.2)
IMM GRANULOCYTES NFR BLD AUTO: 1 % (ref 0–2)
INR PPP: 1 (ref 0.84–1.19)
LYMPHOCYTES # BLD AUTO: 0.93 THOUSANDS/ΜL (ref 0.6–4.47)
LYMPHOCYTES NFR BLD AUTO: 15 % (ref 14–44)
MCH RBC QN AUTO: 30.8 PG (ref 26.8–34.3)
MCHC RBC AUTO-ENTMCNC: 33.2 G/DL (ref 31.4–37.4)
MCV RBC AUTO: 93 FL (ref 82–98)
MONOCYTES # BLD AUTO: 0.83 THOUSAND/ΜL (ref 0.17–1.22)
MONOCYTES NFR BLD AUTO: 14 % (ref 4–12)
NEUTROPHILS # BLD AUTO: 4.34 THOUSANDS/ΜL (ref 1.85–7.62)
NEUTS SEG NFR BLD AUTO: 70 % (ref 43–75)
NRBC BLD AUTO-RTO: 0 /100 WBCS
NT-PROBNP SERPL-MCNC: 128 PG/ML
P AXIS: 49 DEGREES
PLATELET # BLD AUTO: 109 THOUSANDS/UL (ref 149–390)
PMV BLD AUTO: 11.5 FL (ref 8.9–12.7)
POTASSIUM SERPL-SCNC: 4 MMOL/L (ref 3.5–5.3)
PR INTERVAL: 142 MS
PROT SERPL-MCNC: 7.7 G/DL (ref 6.4–8.2)
PROTHROMBIN TIME: 13 SECONDS (ref 11.6–14.5)
QRS AXIS: 12 DEGREES
QRSD INTERVAL: 76 MS
QT INTERVAL: 358 MS
QTC INTERVAL: 437 MS
RBC # BLD AUTO: 5.68 MILLION/UL (ref 3.88–5.62)
SODIUM SERPL-SCNC: 134 MMOL/L (ref 136–145)
T WAVE AXIS: 47 DEGREES
VENTRICULAR RATE: 90 BPM
WBC # BLD AUTO: 6.16 THOUSAND/UL (ref 4.31–10.16)

## 2021-12-13 PROCEDURE — 83880 ASSAY OF NATRIURETIC PEPTIDE: CPT | Performed by: EMERGENCY MEDICINE

## 2021-12-13 PROCEDURE — 85025 COMPLETE CBC W/AUTO DIFF WBC: CPT | Performed by: EMERGENCY MEDICINE

## 2021-12-13 PROCEDURE — 80053 COMPREHEN METABOLIC PANEL: CPT | Performed by: EMERGENCY MEDICINE

## 2021-12-13 PROCEDURE — 36415 COLL VENOUS BLD VENIPUNCTURE: CPT | Performed by: EMERGENCY MEDICINE

## 2021-12-13 PROCEDURE — 82550 ASSAY OF CK (CPK): CPT | Performed by: EMERGENCY MEDICINE

## 2021-12-13 PROCEDURE — 84484 ASSAY OF TROPONIN QUANT: CPT | Performed by: EMERGENCY MEDICINE

## 2021-12-13 PROCEDURE — 99223 1ST HOSP IP/OBS HIGH 75: CPT | Performed by: STUDENT IN AN ORGANIZED HEALTH CARE EDUCATION/TRAINING PROGRAM

## 2021-12-13 PROCEDURE — 85379 FIBRIN DEGRADATION QUANT: CPT | Performed by: EMERGENCY MEDICINE

## 2021-12-13 PROCEDURE — 85610 PROTHROMBIN TIME: CPT | Performed by: EMERGENCY MEDICINE

## 2021-12-13 PROCEDURE — 96374 THER/PROPH/DIAG INJ IV PUSH: CPT

## 2021-12-13 PROCEDURE — 99284 EMERGENCY DEPT VISIT MOD MDM: CPT | Performed by: EMERGENCY MEDICINE

## 2021-12-13 PROCEDURE — 93005 ELECTROCARDIOGRAM TRACING: CPT

## 2021-12-13 PROCEDURE — 71045 X-RAY EXAM CHEST 1 VIEW: CPT

## 2021-12-13 PROCEDURE — 99285 EMERGENCY DEPT VISIT HI MDM: CPT

## 2021-12-13 PROCEDURE — 86140 C-REACTIVE PROTEIN: CPT | Performed by: EMERGENCY MEDICINE

## 2021-12-13 PROCEDURE — 93010 ELECTROCARDIOGRAM REPORT: CPT | Performed by: INTERNAL MEDICINE

## 2021-12-13 PROCEDURE — 85730 THROMBOPLASTIN TIME PARTIAL: CPT | Performed by: EMERGENCY MEDICINE

## 2021-12-13 RX ORDER — DEXAMETHASONE SODIUM PHOSPHATE 4 MG/ML
6 INJECTION, SOLUTION INTRA-ARTICULAR; INTRALESIONAL; INTRAMUSCULAR; INTRAVENOUS; SOFT TISSUE ONCE
Status: COMPLETED | OUTPATIENT
Start: 2021-12-13 | End: 2021-12-13

## 2021-12-13 RX ORDER — ALBUTEROL SULFATE 90 UG/1
2 AEROSOL, METERED RESPIRATORY (INHALATION) EVERY 6 HOURS PRN
Status: DISCONTINUED | OUTPATIENT
Start: 2021-12-13 | End: 2021-12-17 | Stop reason: HOSPADM

## 2021-12-13 RX ORDER — BENZONATATE 100 MG/1
200 CAPSULE ORAL 3 TIMES DAILY PRN
Status: DISCONTINUED | OUTPATIENT
Start: 2021-12-13 | End: 2021-12-17 | Stop reason: HOSPADM

## 2021-12-13 RX ORDER — DEXAMETHASONE SODIUM PHOSPHATE 4 MG/ML
6 INJECTION, SOLUTION INTRA-ARTICULAR; INTRALESIONAL; INTRAMUSCULAR; INTRAVENOUS; SOFT TISSUE EVERY 24 HOURS
Status: DISCONTINUED | OUTPATIENT
Start: 2021-12-14 | End: 2021-12-17 | Stop reason: HOSPADM

## 2021-12-13 RX ADMIN — DEXAMETHASONE SODIUM PHOSPHATE 6 MG: 4 INJECTION, SOLUTION INTRA-ARTICULAR; INTRALESIONAL; INTRAMUSCULAR; INTRAVENOUS; SOFT TISSUE at 14:06

## 2021-12-14 PROBLEM — E87.1 HYPONATREMIA: Status: ACTIVE | Noted: 2021-12-14

## 2021-12-14 LAB
ALBUMIN SERPL BCP-MCNC: 3 G/DL (ref 3.5–5)
ALP SERPL-CCNC: 55 U/L (ref 46–116)
ALT SERPL W P-5'-P-CCNC: 42 U/L (ref 12–78)
ANION GAP SERPL CALCULATED.3IONS-SCNC: 11 MMOL/L (ref 4–13)
AST SERPL W P-5'-P-CCNC: 37 U/L (ref 5–45)
BASOPHILS # BLD AUTO: 0.02 THOUSANDS/ΜL (ref 0–0.1)
BASOPHILS NFR BLD AUTO: 0 % (ref 0–1)
BILIRUB SERPL-MCNC: 0.48 MG/DL (ref 0.2–1)
BUN SERPL-MCNC: 27 MG/DL (ref 5–25)
CALCIUM ALBUM COR SERPL-MCNC: 9 MG/DL (ref 8.3–10.1)
CALCIUM SERPL-MCNC: 8.2 MG/DL (ref 8.3–10.1)
CHLORIDE SERPL-SCNC: 96 MMOL/L (ref 100–108)
CO2 SERPL-SCNC: 24 MMOL/L (ref 21–32)
CREAT SERPL-MCNC: 1.22 MG/DL (ref 0.6–1.3)
CRP SERPL QL: 24.7 MG/L
EOSINOPHIL # BLD AUTO: 0 THOUSAND/ΜL (ref 0–0.61)
EOSINOPHIL NFR BLD AUTO: 0 % (ref 0–6)
ERYTHROCYTE [DISTWIDTH] IN BLOOD BY AUTOMATED COUNT: 12.4 % (ref 11.6–15.1)
GFR SERPL CREATININE-BSD FRML MDRD: 57 ML/MIN/1.73SQ M
GLUCOSE SERPL-MCNC: 117 MG/DL (ref 65–140)
HCT VFR BLD AUTO: 49.9 % (ref 36.5–49.3)
HGB BLD-MCNC: 16.6 G/DL (ref 12–17)
IMM GRANULOCYTES # BLD AUTO: 0.03 THOUSAND/UL (ref 0–0.2)
IMM GRANULOCYTES NFR BLD AUTO: 1 % (ref 0–2)
LYMPHOCYTES # BLD AUTO: 1.4 THOUSANDS/ΜL (ref 0.6–4.47)
LYMPHOCYTES NFR BLD AUTO: 29 % (ref 14–44)
MCH RBC QN AUTO: 30.8 PG (ref 26.8–34.3)
MCHC RBC AUTO-ENTMCNC: 33.3 G/DL (ref 31.4–37.4)
MCV RBC AUTO: 93 FL (ref 82–98)
MONOCYTES # BLD AUTO: 0.75 THOUSAND/ΜL (ref 0.17–1.22)
MONOCYTES NFR BLD AUTO: 16 % (ref 4–12)
NEUTROPHILS # BLD AUTO: 2.57 THOUSANDS/ΜL (ref 1.85–7.62)
NEUTS SEG NFR BLD AUTO: 54 % (ref 43–75)
NRBC BLD AUTO-RTO: 0 /100 WBCS
PLATELET # BLD AUTO: 111 THOUSANDS/UL (ref 149–390)
PMV BLD AUTO: 11.8 FL (ref 8.9–12.7)
POTASSIUM SERPL-SCNC: 3.5 MMOL/L (ref 3.5–5.3)
PROCALCITONIN SERPL-MCNC: 0.06 NG/ML
PROT SERPL-MCNC: 7 G/DL (ref 6.4–8.2)
RBC # BLD AUTO: 5.39 MILLION/UL (ref 3.88–5.62)
SODIUM SERPL-SCNC: 131 MMOL/L (ref 136–145)
WBC # BLD AUTO: 4.77 THOUSAND/UL (ref 4.31–10.16)

## 2021-12-14 PROCEDURE — 99232 SBSQ HOSP IP/OBS MODERATE 35: CPT | Performed by: FAMILY MEDICINE

## 2021-12-14 PROCEDURE — 84145 PROCALCITONIN (PCT): CPT | Performed by: STUDENT IN AN ORGANIZED HEALTH CARE EDUCATION/TRAINING PROGRAM

## 2021-12-14 PROCEDURE — 80053 COMPREHEN METABOLIC PANEL: CPT | Performed by: STUDENT IN AN ORGANIZED HEALTH CARE EDUCATION/TRAINING PROGRAM

## 2021-12-14 PROCEDURE — 85025 COMPLETE CBC W/AUTO DIFF WBC: CPT | Performed by: STUDENT IN AN ORGANIZED HEALTH CARE EDUCATION/TRAINING PROGRAM

## 2021-12-14 PROCEDURE — 86140 C-REACTIVE PROTEIN: CPT | Performed by: STUDENT IN AN ORGANIZED HEALTH CARE EDUCATION/TRAINING PROGRAM

## 2021-12-14 PROCEDURE — XW033E5 INTRODUCTION OF REMDESIVIR ANTI-INFECTIVE INTO PERIPHERAL VEIN, PERCUTANEOUS APPROACH, NEW TECHNOLOGY GROUP 5: ICD-10-PCS | Performed by: FAMILY MEDICINE

## 2021-12-14 RX ORDER — SODIUM CHLORIDE 9 MG/ML
50 INJECTION, SOLUTION INTRAVENOUS CONTINUOUS
Status: DISCONTINUED | OUTPATIENT
Start: 2021-12-14 | End: 2021-12-14

## 2021-12-14 RX ORDER — SODIUM CHLORIDE 9 MG/ML
50 INJECTION, SOLUTION INTRAVENOUS CONTINUOUS
Status: DISPENSED | OUTPATIENT
Start: 2021-12-14 | End: 2021-12-14

## 2021-12-14 RX ADMIN — DEXAMETHASONE SODIUM PHOSPHATE 6 MG: 4 INJECTION, SOLUTION INTRA-ARTICULAR; INTRALESIONAL; INTRAMUSCULAR; INTRAVENOUS; SOFT TISSUE at 09:52

## 2021-12-14 RX ADMIN — SODIUM CHLORIDE 50 ML/HR: 0.9 INJECTION, SOLUTION INTRAVENOUS at 18:52

## 2021-12-14 RX ADMIN — ENOXAPARIN SODIUM 30 MG: 30 INJECTION SUBCUTANEOUS at 21:26

## 2021-12-14 RX ADMIN — REMDESIVIR 200 MG: 100 INJECTION, POWDER, LYOPHILIZED, FOR SOLUTION INTRAVENOUS at 01:46

## 2021-12-14 RX ADMIN — ENOXAPARIN SODIUM 100 MG: 100 INJECTION SUBCUTANEOUS at 00:32

## 2021-12-14 RX ADMIN — ENOXAPARIN SODIUM 100 MG: 100 INJECTION SUBCUTANEOUS at 09:52

## 2021-12-15 LAB
ALBUMIN SERPL BCP-MCNC: 2.6 G/DL (ref 3.5–5)
ALP SERPL-CCNC: 50 U/L (ref 46–116)
ALT SERPL W P-5'-P-CCNC: 37 U/L (ref 12–78)
ANION GAP SERPL CALCULATED.3IONS-SCNC: 12 MMOL/L (ref 4–13)
AST SERPL W P-5'-P-CCNC: 34 U/L (ref 5–45)
BILIRUB SERPL-MCNC: 0.38 MG/DL (ref 0.2–1)
BUN SERPL-MCNC: 25 MG/DL (ref 5–25)
CALCIUM ALBUM COR SERPL-MCNC: 8.9 MG/DL (ref 8.3–10.1)
CALCIUM SERPL-MCNC: 7.8 MG/DL (ref 8.3–10.1)
CHLORIDE SERPL-SCNC: 102 MMOL/L (ref 100–108)
CO2 SERPL-SCNC: 22 MMOL/L (ref 21–32)
CREAT SERPL-MCNC: 0.94 MG/DL (ref 0.6–1.3)
ERYTHROCYTE [DISTWIDTH] IN BLOOD BY AUTOMATED COUNT: 12.2 % (ref 11.6–15.1)
GFR SERPL CREATININE-BSD FRML MDRD: 78 ML/MIN/1.73SQ M
GLUCOSE SERPL-MCNC: 87 MG/DL (ref 65–140)
HCT VFR BLD AUTO: 46.6 % (ref 36.5–49.3)
HGB BLD-MCNC: 15.5 G/DL (ref 12–17)
MCH RBC QN AUTO: 30.5 PG (ref 26.8–34.3)
MCHC RBC AUTO-ENTMCNC: 33.3 G/DL (ref 31.4–37.4)
MCV RBC AUTO: 92 FL (ref 82–98)
PLATELET # BLD AUTO: 115 THOUSANDS/UL (ref 149–390)
PMV BLD AUTO: 11.6 FL (ref 8.9–12.7)
POTASSIUM SERPL-SCNC: 4 MMOL/L (ref 3.5–5.3)
PROCALCITONIN SERPL-MCNC: <0.05 NG/ML
PROT SERPL-MCNC: 6.3 G/DL (ref 6.4–8.2)
RBC # BLD AUTO: 5.08 MILLION/UL (ref 3.88–5.62)
SODIUM SERPL-SCNC: 136 MMOL/L (ref 136–145)
WBC # BLD AUTO: 5.45 THOUSAND/UL (ref 4.31–10.16)

## 2021-12-15 PROCEDURE — 80053 COMPREHEN METABOLIC PANEL: CPT | Performed by: FAMILY MEDICINE

## 2021-12-15 PROCEDURE — 99232 SBSQ HOSP IP/OBS MODERATE 35: CPT | Performed by: FAMILY MEDICINE

## 2021-12-15 PROCEDURE — 84145 PROCALCITONIN (PCT): CPT | Performed by: STUDENT IN AN ORGANIZED HEALTH CARE EDUCATION/TRAINING PROGRAM

## 2021-12-15 PROCEDURE — 85027 COMPLETE CBC AUTOMATED: CPT | Performed by: FAMILY MEDICINE

## 2021-12-15 RX ADMIN — REMDESIVIR 100 MG: 100 INJECTION, POWDER, LYOPHILIZED, FOR SOLUTION INTRAVENOUS at 01:10

## 2021-12-15 RX ADMIN — ENOXAPARIN SODIUM 30 MG: 30 INJECTION SUBCUTANEOUS at 21:59

## 2021-12-15 RX ADMIN — ENOXAPARIN SODIUM 30 MG: 30 INJECTION SUBCUTANEOUS at 08:25

## 2021-12-15 RX ADMIN — DEXAMETHASONE SODIUM PHOSPHATE 6 MG: 4 INJECTION, SOLUTION INTRA-ARTICULAR; INTRALESIONAL; INTRAMUSCULAR; INTRAVENOUS; SOFT TISSUE at 12:02

## 2021-12-15 RX ADMIN — B-COMPLEX W/ C & FOLIC ACID TAB 1 TABLET: TAB at 08:25

## 2021-12-16 LAB
ALBUMIN SERPL BCP-MCNC: 2.6 G/DL (ref 3.5–5)
ALP SERPL-CCNC: 52 U/L (ref 46–116)
ALT SERPL W P-5'-P-CCNC: 35 U/L (ref 12–78)
ANION GAP SERPL CALCULATED.3IONS-SCNC: 9 MMOL/L (ref 4–13)
AST SERPL W P-5'-P-CCNC: 29 U/L (ref 5–45)
BILIRUB SERPL-MCNC: 0.36 MG/DL (ref 0.2–1)
BUN SERPL-MCNC: 19 MG/DL (ref 5–25)
CALCIUM ALBUM COR SERPL-MCNC: 9.3 MG/DL (ref 8.3–10.1)
CALCIUM SERPL-MCNC: 8.2 MG/DL (ref 8.3–10.1)
CHLORIDE SERPL-SCNC: 102 MMOL/L (ref 100–108)
CO2 SERPL-SCNC: 25 MMOL/L (ref 21–32)
CREAT SERPL-MCNC: 0.86 MG/DL (ref 0.6–1.3)
CRP SERPL QL: 15.1 MG/L
ERYTHROCYTE [DISTWIDTH] IN BLOOD BY AUTOMATED COUNT: 12.3 % (ref 11.6–15.1)
GFR SERPL CREATININE-BSD FRML MDRD: 84 ML/MIN/1.73SQ M
GLUCOSE SERPL-MCNC: 98 MG/DL (ref 65–140)
HCT VFR BLD AUTO: 47.1 % (ref 36.5–49.3)
HGB BLD-MCNC: 15.7 G/DL (ref 12–17)
MCH RBC QN AUTO: 30.3 PG (ref 26.8–34.3)
MCHC RBC AUTO-ENTMCNC: 33.3 G/DL (ref 31.4–37.4)
MCV RBC AUTO: 91 FL (ref 82–98)
PLATELET # BLD AUTO: 110 THOUSANDS/UL (ref 149–390)
PMV BLD AUTO: 11.4 FL (ref 8.9–12.7)
POTASSIUM SERPL-SCNC: 4.2 MMOL/L (ref 3.5–5.3)
PROT SERPL-MCNC: 6.2 G/DL (ref 6.4–8.2)
RBC # BLD AUTO: 5.19 MILLION/UL (ref 3.88–5.62)
SODIUM SERPL-SCNC: 136 MMOL/L (ref 136–145)
WBC # BLD AUTO: 4.15 THOUSAND/UL (ref 4.31–10.16)

## 2021-12-16 PROCEDURE — 99232 SBSQ HOSP IP/OBS MODERATE 35: CPT | Performed by: FAMILY MEDICINE

## 2021-12-16 PROCEDURE — 86140 C-REACTIVE PROTEIN: CPT | Performed by: FAMILY MEDICINE

## 2021-12-16 PROCEDURE — 80053 COMPREHEN METABOLIC PANEL: CPT | Performed by: FAMILY MEDICINE

## 2021-12-16 PROCEDURE — 85027 COMPLETE CBC AUTOMATED: CPT | Performed by: FAMILY MEDICINE

## 2021-12-16 RX ORDER — LORATADINE 10 MG/1
10 TABLET ORAL DAILY
Status: DISCONTINUED | OUTPATIENT
Start: 2021-12-16 | End: 2021-12-17 | Stop reason: HOSPADM

## 2021-12-16 RX ADMIN — B-COMPLEX W/ C & FOLIC ACID TAB 1 TABLET: TAB at 09:40

## 2021-12-16 RX ADMIN — ENOXAPARIN SODIUM 30 MG: 30 INJECTION SUBCUTANEOUS at 09:42

## 2021-12-16 RX ADMIN — DEXAMETHASONE SODIUM PHOSPHATE 6 MG: 4 INJECTION, SOLUTION INTRA-ARTICULAR; INTRALESIONAL; INTRAMUSCULAR; INTRAVENOUS; SOFT TISSUE at 09:41

## 2021-12-16 RX ADMIN — REMDESIVIR 100 MG: 100 INJECTION, POWDER, LYOPHILIZED, FOR SOLUTION INTRAVENOUS at 00:54

## 2021-12-16 RX ADMIN — ENOXAPARIN SODIUM 30 MG: 30 INJECTION SUBCUTANEOUS at 20:56

## 2021-12-16 RX ADMIN — LORATADINE 10 MG: 10 TABLET ORAL at 17:57

## 2021-12-17 VITALS
HEART RATE: 52 BPM | SYSTOLIC BLOOD PRESSURE: 145 MMHG | TEMPERATURE: 97 F | DIASTOLIC BLOOD PRESSURE: 70 MMHG | HEIGHT: 69 IN | RESPIRATION RATE: 18 BRPM | WEIGHT: 225.09 LBS | BODY MASS INDEX: 33.34 KG/M2 | OXYGEN SATURATION: 95 %

## 2021-12-17 PROBLEM — J96.01 ACUTE RESPIRATORY FAILURE WITH HYPOXIA (HCC): Status: RESOLVED | Noted: 2021-12-13 | Resolved: 2021-12-17

## 2021-12-17 PROBLEM — E87.1 HYPONATREMIA: Status: RESOLVED | Noted: 2021-12-14 | Resolved: 2021-12-17

## 2021-12-17 PROCEDURE — 97162 PT EVAL MOD COMPLEX 30 MIN: CPT

## 2021-12-17 PROCEDURE — 99239 HOSP IP/OBS DSCHRG MGMT >30: CPT | Performed by: FAMILY MEDICINE

## 2021-12-17 PROCEDURE — 97110 THERAPEUTIC EXERCISES: CPT

## 2021-12-17 RX ORDER — LORATADINE 10 MG/1
10 TABLET ORAL DAILY
Qty: 20 TABLET | Refills: 0 | Status: SHIPPED | OUTPATIENT
Start: 2021-12-18

## 2021-12-17 RX ORDER — LORATADINE 10 MG/1
10 TABLET ORAL DAILY
Qty: 20 TABLET | Refills: 0 | Status: SHIPPED | OUTPATIENT
Start: 2021-12-18 | End: 2021-12-17

## 2021-12-17 RX ORDER — PREDNISONE 10 MG/1
TABLET ORAL
Qty: 20 TABLET | Refills: 0 | Status: SHIPPED | OUTPATIENT
Start: 2021-12-18 | End: 2021-12-17 | Stop reason: SDUPTHER

## 2021-12-17 RX ORDER — PREDNISONE 10 MG/1
TABLET ORAL
Qty: 20 TABLET | Refills: 0 | Status: SHIPPED | OUTPATIENT
Start: 2021-12-18 | End: 2021-12-30

## 2021-12-17 RX ADMIN — ENOXAPARIN SODIUM 30 MG: 30 INJECTION SUBCUTANEOUS at 11:42

## 2021-12-17 RX ADMIN — REMDESIVIR 100 MG: 100 INJECTION, POWDER, LYOPHILIZED, FOR SOLUTION INTRAVENOUS at 02:35

## 2021-12-17 RX ADMIN — LORATADINE 10 MG: 10 TABLET ORAL at 10:04

## 2021-12-17 RX ADMIN — DEXAMETHASONE SODIUM PHOSPHATE 6 MG: 4 INJECTION, SOLUTION INTRA-ARTICULAR; INTRALESIONAL; INTRAMUSCULAR; INTRAVENOUS; SOFT TISSUE at 10:05

## 2021-12-17 RX ADMIN — B-COMPLEX W/ C & FOLIC ACID TAB 1 TABLET: TAB at 10:04

## 2021-12-18 ENCOUNTER — TELEPHONE (OUTPATIENT)
Dept: FAMILY MEDICINE CLINIC | Facility: CLINIC | Age: 76
End: 2021-12-18

## 2021-12-20 ENCOUNTER — TRANSITIONAL CARE MANAGEMENT (OUTPATIENT)
Dept: FAMILY MEDICINE CLINIC | Facility: CLINIC | Age: 76
End: 2021-12-20

## 2021-12-22 ENCOUNTER — OFFICE VISIT (OUTPATIENT)
Dept: FAMILY MEDICINE CLINIC | Facility: CLINIC | Age: 76
End: 2021-12-22
Payer: COMMERCIAL

## 2021-12-22 VITALS
DIASTOLIC BLOOD PRESSURE: 76 MMHG | OXYGEN SATURATION: 93 % | BODY MASS INDEX: 31.84 KG/M2 | SYSTOLIC BLOOD PRESSURE: 122 MMHG | WEIGHT: 215 LBS | HEART RATE: 100 BPM | HEIGHT: 69 IN | TEMPERATURE: 98.1 F | RESPIRATION RATE: 22 BRPM

## 2021-12-22 DIAGNOSIS — U07.1 COVID-19: Primary | ICD-10-CM

## 2021-12-22 DIAGNOSIS — E66.9 OBESITY (BMI 30-39.9): ICD-10-CM

## 2021-12-22 DIAGNOSIS — R53.83 OTHER FATIGUE: ICD-10-CM

## 2021-12-22 DIAGNOSIS — B35.6 TINEA CRURIS: ICD-10-CM

## 2021-12-22 PROCEDURE — 1111F DSCHRG MED/CURRENT MED MERGE: CPT | Performed by: FAMILY MEDICINE

## 2021-12-22 PROCEDURE — 99496 TRANSJ CARE MGMT HIGH F2F 7D: CPT | Performed by: FAMILY MEDICINE

## 2021-12-22 RX ORDER — FLUCONAZOLE 100 MG/1
100 TABLET ORAL DAILY
Qty: 5 TABLET | Refills: 0 | Status: SHIPPED | OUTPATIENT
Start: 2021-12-22 | End: 2021-12-27

## 2021-12-23 ENCOUNTER — TELEPHONE (OUTPATIENT)
Dept: FAMILY MEDICINE CLINIC | Facility: CLINIC | Age: 76
End: 2021-12-23

## 2021-12-23 ENCOUNTER — HOSPITAL ENCOUNTER (EMERGENCY)
Facility: HOSPITAL | Age: 76
Discharge: HOME | End: 2021-12-23
Attending: EMERGENCY MEDICINE | Admitting: EMERGENCY MEDICINE
Payer: COMMERCIAL

## 2021-12-23 VITALS
SYSTOLIC BLOOD PRESSURE: 173 MMHG | DIASTOLIC BLOOD PRESSURE: 99 MMHG | WEIGHT: 215 LBS | RESPIRATION RATE: 18 BRPM | BODY MASS INDEX: 31.84 KG/M2 | HEIGHT: 69 IN | TEMPERATURE: 96.8 F | OXYGEN SATURATION: 96 % | HEART RATE: 83 BPM

## 2021-12-23 DIAGNOSIS — R30.0 DYSURIA: Primary | ICD-10-CM

## 2021-12-23 LAB
BACTERIA UR QL AUTO: ABNORMAL /HPF
BILIRUB UR QL STRIP: NEGATIVE
CLARITY UR: CLEAR
COLOR UR: YELLOW
GLUCOSE UR STRIP-MCNC: NEGATIVE MG/DL
HGB UR QL STRIP.AUTO: NEGATIVE
HYALINE CASTS #/AREA URNS LPF: ABNORMAL /LPF
KETONES UR STRIP-MCNC: ABNORMAL MG/DL
LEUKOCYTE ESTERASE UR QL STRIP: NEGATIVE
MUCOUS THREADS UR QL AUTO: ABNORMAL
NITRITE UR QL STRIP: NEGATIVE
NON-SQ EPI CELLS URNS QL MICRO: ABNORMAL /HPF
PH UR STRIP.AUTO: 5.5 [PH]
PROT UR STRIP-MCNC: NEGATIVE MG/DL
RBC #/AREA URNS AUTO: ABNORMAL /HPF
SP GR UR STRIP.AUTO: >=1.03 (ref 1–1.03)
UROBILINOGEN UR QL STRIP.AUTO: 0.2 E.U./DL
WBC #/AREA URNS AUTO: ABNORMAL /HPF

## 2021-12-23 PROCEDURE — 99284 EMERGENCY DEPT VISIT MOD MDM: CPT

## 2021-12-23 PROCEDURE — 81001 URINALYSIS AUTO W/SCOPE: CPT | Performed by: EMERGENCY MEDICINE

## 2021-12-23 PROCEDURE — 99284 EMERGENCY DEPT VISIT MOD MDM: CPT | Performed by: EMERGENCY MEDICINE

## 2021-12-23 RX ORDER — PHENAZOPYRIDINE HYDROCHLORIDE 100 MG/1
100 TABLET, FILM COATED ORAL ONCE
Status: COMPLETED | OUTPATIENT
Start: 2021-12-23 | End: 2021-12-23

## 2021-12-23 RX ORDER — CEPHALEXIN 500 MG/1
500 CAPSULE ORAL EVERY 6 HOURS SCHEDULED
Qty: 28 CAPSULE | Refills: 0 | Status: SHIPPED | OUTPATIENT
Start: 2021-12-23 | End: 2021-12-30

## 2021-12-23 RX ORDER — ACETAMINOPHEN 325 MG/1
975 TABLET ORAL ONCE
Status: COMPLETED | OUTPATIENT
Start: 2021-12-23 | End: 2021-12-23

## 2021-12-23 RX ORDER — CEPHALEXIN 500 MG/1
500 CAPSULE ORAL ONCE
Status: COMPLETED | OUTPATIENT
Start: 2021-12-23 | End: 2021-12-23

## 2021-12-23 RX ORDER — PHENAZOPYRIDINE HYDROCHLORIDE 200 MG/1
200 TABLET, FILM COATED ORAL 3 TIMES DAILY
Qty: 6 TABLET | Refills: 0 | Status: SHIPPED | OUTPATIENT
Start: 2021-12-23

## 2021-12-23 RX ADMIN — CEPHALEXIN 500 MG: 500 CAPSULE ORAL at 19:18

## 2021-12-23 RX ADMIN — PHENAZOPYRIDINE 100 MG: 100 TABLET ORAL at 19:11

## 2021-12-23 RX ADMIN — ACETAMINOPHEN 975 MG: 325 TABLET, FILM COATED ORAL at 19:11

## 2021-12-30 ENCOUNTER — OFFICE VISIT (OUTPATIENT)
Dept: FAMILY MEDICINE CLINIC | Facility: CLINIC | Age: 76
End: 2021-12-30
Payer: COMMERCIAL

## 2021-12-30 ENCOUNTER — TELEPHONE (OUTPATIENT)
Dept: FAMILY MEDICINE CLINIC | Facility: CLINIC | Age: 76
End: 2021-12-30

## 2021-12-30 VITALS
OXYGEN SATURATION: 93 % | WEIGHT: 224 LBS | HEIGHT: 69 IN | BODY MASS INDEX: 33.18 KG/M2 | DIASTOLIC BLOOD PRESSURE: 70 MMHG | RESPIRATION RATE: 20 BRPM | TEMPERATURE: 100.5 F | HEART RATE: 95 BPM | SYSTOLIC BLOOD PRESSURE: 142 MMHG

## 2021-12-30 DIAGNOSIS — R30.0 DYSURIA: Primary | ICD-10-CM

## 2021-12-30 LAB
SL AMB  POCT GLUCOSE, UA: ABNORMAL
SL AMB LEUKOCYTE ESTERASE,UA: ABNORMAL
SL AMB POCT BILIRUBIN,UA: ABNORMAL
SL AMB POCT BLOOD,UA: ABNORMAL
SL AMB POCT CLARITY,UA: CLEAR
SL AMB POCT COLOR,UA: ABNORMAL
SL AMB POCT KETONES,UA: ABNORMAL
SL AMB POCT NITRITE,UA: ABNORMAL
SL AMB POCT PH,UA: 5.5
SL AMB POCT SPECIFIC GRAVITY,UA: 1.03
SL AMB POCT URINE PROTEIN: ABNORMAL
SL AMB POCT UROBILINOGEN: 0.2

## 2021-12-30 PROCEDURE — 1111F DSCHRG MED/CURRENT MED MERGE: CPT | Performed by: NURSE PRACTITIONER

## 2021-12-30 PROCEDURE — 81003 URINALYSIS AUTO W/O SCOPE: CPT | Performed by: NURSE PRACTITIONER

## 2021-12-30 PROCEDURE — 99213 OFFICE O/P EST LOW 20 MIN: CPT | Performed by: NURSE PRACTITIONER

## 2021-12-30 RX ORDER — CIPROFLOXACIN 500 MG/1
500 TABLET, FILM COATED ORAL EVERY 12 HOURS SCHEDULED
Qty: 10 TABLET | Refills: 0 | Status: SHIPPED | OUTPATIENT
Start: 2021-12-30 | End: 2021-12-30

## 2021-12-30 NOTE — TELEPHONE ENCOUNTER
Pt dropped off forms to be completed by Dr Ruslan Holliday  Placed in Dr conrad  Pls call when ready for pickup

## 2021-12-31 LAB
BACTERIA UR CULT: NORMAL
Lab: NO GROWTH

## 2022-01-04 ENCOUNTER — TELEPHONE (OUTPATIENT)
Dept: FAMILY MEDICINE CLINIC | Facility: CLINIC | Age: 77
End: 2022-01-04

## 2022-01-04 ENCOUNTER — EVALUATION (OUTPATIENT)
Dept: OCCUPATIONAL THERAPY | Facility: CLINIC | Age: 77
End: 2022-01-04
Payer: COMMERCIAL

## 2022-01-04 DIAGNOSIS — U07.1 COVID-19: ICD-10-CM

## 2022-01-04 DIAGNOSIS — R53.83 OTHER FATIGUE: ICD-10-CM

## 2022-01-04 PROCEDURE — 97166 OT EVAL MOD COMPLEX 45 MIN: CPT | Performed by: OCCUPATIONAL THERAPIST

## 2022-01-04 PROCEDURE — 96125 COGNITIVE TEST BY HC PRO: CPT | Performed by: OCCUPATIONAL THERAPIST

## 2022-01-04 NOTE — TELEPHONE ENCOUNTER
Spoke w/ pt, aware culture was negative  Pt reports symptoms are improving, however not completely resolved  Advised urology eddy as a next step, he already has one that he uses  He will call to schedule f/u   Rell Ford

## 2022-01-04 NOTE — PROGRESS NOTES
OCCUPATIONAL THERAPY INITIAL EVALUATION:      1/4/2022  Maggie Whitt  1945  [id number]  Wadelcroal Rosariomin, DO   Diagnosis ICD-10-CM Associated Orders   1  COVID-19  U07 1 Ambulatory referral to Occupational Therapy   2  Other fatigue  R53 83 Ambulatory referral to Occupational Therapy     Precautions: reports hx B RC tears, poor activity tolerance    Assessment/Plan    SKILLED ANALYSIS:  Pt was diagnosed with COVID-19 on 12/7/21 and reports increased fatigue, poor activity tolerance, and difficulty with concentration and memory  Pt reports difficulties in completing IADLs and is not working at this time d/t effects of COVID-19  Educated patient on effects of COVID-19 and all questions answered  Pt also referred for PT and agreeable to scheduling evaluation  Pt will benefit from Occupational Therapy 2x/week for 8-12 weeks with focus on cognition  PLAN OF CARE START:1/4/22  PLAN OF CARE END: 4/2/22  FREQUENCY: 2x/wk    SUBJECTIVE:  Pt's Goal: to return to work when he feels strong    Occupational Profile: Pt works FT as a [employer] however has been out of work since having COVID in early December 2021  He reports that he used to enjoy going to the gym, however has not been going since the pandemic started  He states he sometimes enjoys watching hockey, but doesn't have many hobbies  He enjoys traveling  He lives alone and is engaged       PAIN:  At rest  0/10    OBJECTIVE:    CHARLIE   Gross grasp strength:  R: 90lbs  L: 64lbs   Lateral Pinch:  R 20lbs  L: 15lbs    MMT:  RUE  Shoulder flexion 3-/5  Shoulder abduction 3-/5  Elbow flexion 4/5  Elbow extension 4/5    LUE  Shoulder flexion 3-/5  Shoulder abduction 3-/5  Elbow flexion 5/5  Elbow extension 5/5        Assessments  The Repeatable Battery for the Assessment of Neuropsychological Status (RBANS) is a brief, individually-administered assessment which measures attention, language, visuospatial/constructional abilities, and immediate & delayed memory  The RBANS is intended for use with adolescents to adults, ages 15 to 80 years  The following results were obtained during the administration of the assessment  Form: C    Cognitive Domain/Subtest: Index Score: Percentile Rank: Classification: IE: Status:   IMMEDIATE MEMORY 78 %ile Borderline 78         1  List Learning (21/40)          2  Story Memory (10/24)           VISUOSPATIAL/  CONSTRUCTIONAL 121 %ile Superior 121         3  Figure Copy (20/20)          4  Line Orientation (18/20)           LANGUAGE 75 %ile Borderline 75         5  Picture Naming (8/10)          6  Semantic Fluency (14/40)           ATTENTION 82 %ile Low Average 82         7  Digit Span (8/16)          8  Coding (28/89)           DELAYED MEMORY 95 %ile Average 95         9  List Recall (3/10)          10  List Recognition (20/20)          11  Story Recall (4/12)          12  Figure Recall (11/20)           Sum of Index Scores:  451  451    Total Scale:  86      Percentile: 18%ile      Classification: Low Average          IE indicates the scores from the initial evaluation (1/4/22)  Form: C    SHORT TERM GOALS 4-6 weeks:     Memory   -Pt will demo G carryover of use of internal/external memory strategy aides for improved recall of daily events, with 75% accuracy   -Pt will increase immediate recall being able to recall 34/64 items on RBANS in STM  -Pt will increase delayed recall being able to recall 41/62 items on RBANs in LTM    Upper Extremity   -Pt will improve L  strength by 3 lbs in order to complete IADLs   -Pt will improve R elbow strength to 4+/5 in order to complete IADLs  Attention     ? Pt will maintain attention to task for 20 minutes in semi modal environment for baseline performance,  improved role performance and to improve learning and to simulate return to IADLs     ?  Pt will demo ability to participate in dual tasking/divided attention task with 60% accuracy in multimodal environment to simulate return to life roles  o   LONG TERM GOALS 8-12 weeks  Memory   -Pt will demo G carryover of use of internal/external memory strategy aides for improved recall of daily events, with 90% accuracy   -Pt will increase immediate recall being able to recall 37/64 items on RBANS in STM  -Pt will increase delayed recall being able to recall 43/62 items on RBANs in LTM     Attention     ? Pt will maintain attention to task for 20 minutes in multi modal environment for baseline performance,  improved role performance and to improve learning and to simulate return to IADLs  ?  Pt will demo ability to participate in dual tasking/divided attention task with 90% accuracy in multimodal environment to simulate return to life roles    Upper Extremity   -Pt will improve L  strength by 6 lbs in order to complete IADLs   -Pt will improve R elbow strength to 5/5 in order to complete IADLs        TIME SPENT  90 min for administration, documentation, interpretation, scoring adn POC development RBANS    PLANNED THERAPY INTERVENTIONS:  Internal and external memory aides  Hypersensitivity strategies education  Multi-modal environment  Sustained/alternating/divided attention  Temporal Awareness: Organize the Hour activities  Memory and mental manipulation  Auditory processing with immediate recall  Memory retention with immediate and delayed recall  Edu on cog/vision apps    Eval/ Re-eval POC expires Therisa Vincentus #/ Referral # Total units  Start date  Expiration date Extension                                                             Date               Units:  Used               Authed:  Remaining

## 2022-01-06 ENCOUNTER — EVALUATION (OUTPATIENT)
Dept: PHYSICAL THERAPY | Facility: CLINIC | Age: 77
End: 2022-01-06
Payer: COMMERCIAL

## 2022-01-06 ENCOUNTER — OFFICE VISIT (OUTPATIENT)
Dept: OCCUPATIONAL THERAPY | Facility: CLINIC | Age: 77
End: 2022-01-06
Payer: COMMERCIAL

## 2022-01-06 DIAGNOSIS — U09.9 POST COVID-19 CONDITION, UNSPECIFIED: Primary | ICD-10-CM

## 2022-01-06 DIAGNOSIS — U07.1 COVID-19: Primary | ICD-10-CM

## 2022-01-06 DIAGNOSIS — R53.83 FATIGUE, UNSPECIFIED TYPE: ICD-10-CM

## 2022-01-06 PROCEDURE — 97530 THERAPEUTIC ACTIVITIES: CPT | Performed by: OCCUPATIONAL THERAPIST

## 2022-01-06 PROCEDURE — 97162 PT EVAL MOD COMPLEX 30 MIN: CPT

## 2022-01-06 NOTE — PROGRESS NOTES
Daily Note     Today's date: 2022  Patient name: Kiran Slade  : 1945  MRN: [id number]  Referring provider: Esperanza Celeste DO  Dx:   Encounter Diagnosis   Name Primary?  COVID-19 Yes     Start Time:   Stop Time:   Total time in clinic (min): 57 minutes     Precautions: s/p COVID- poor activity tolerance  Visit 2   PN due 22  POC valid 22    Subjective: "the only thing I can smell is rubbing alcohol"      Objective: See treatment below  OTR educated pt on the following Olfactory Re-training HEP to perform in home environment with the goal of improving scent identification/discrimination and building a new smell vocabulary  1  Find a quiet space in order to limit distractions  2  Smell a scent for 20 seconds with deep inhale through the nose; focus on your memories and experiences with that scent  3  Wait 10 seconds  4  Next, smell the following scent  - Continues this routine for the 4 scents  This recommendation should be repeated twice a day for four to six months or until improved smell is noted  OTR answered all questions  OTR to f/u via phone call in one month to answer further questions and make further recommendations/plans if needed  Pt demo with excellent understanding of HEP and the importance of performed 2x/day        Olfactory Testing:   Presents with hyposmia   Angeles Lemon Eucalyptus Clove   Odor Threshold 1" 1 5" 2" 1"   Odor Discrimination accurate accurate accurate accurate   Odor Identification  "Flowery" unable unable unable     Odor threshold (minimum strength of an odor that can be perceived): Scent will be placed at various distances  Odor discrimination (differentiation between different odors): Scents will be placed 2 cm from nostrils for 3-4 seconds   Odor identification (identification of odors): Scents will be placed 2 cm from nostrils for 3-4 seconds     -Pt provided with yellow theraputty and educated on gross grasp and pinch strengthening exercises to perform for HEP  Pt verbalized understanding of all recommendations      -Immediate/delayed recall with detectives looking chart  Pt recalled 3/3, 4/4, 5/5, 6/6  Assessment: Tolerated treatment well  Pt demonstrating hyposmia and provided olfactory re-training protocol  Pt would benefit from continued OT services to address cognition, UE strength  Plan: Continued skilled OT per POC

## 2022-01-06 NOTE — PROGRESS NOTES
PT Evaluation           Insurance:  AMA/CMS Eval/ Re-eval POC expires Uzma Turk #/ Referral # Total units  Start date  Expiration date Extension  Visit limitation? PT only or  PT+OT? Co-Insurance   Cigna: AMA 2022 3/31/22  Not required NA NA NA NA 60 PCY combined PT/OT/ST Both $10                                                                  Date               Visits: 60 visits PCY combined Used 1              Authed:  Remaining  59                   Date               Units:  Used               Authed:  Remaining                           Today's date: 2022  Patient name: Madhuri Meehan  : 1945  MRN: [id number]  Referring provider: Katie Ho DO  Dx:   Encounter Diagnosis     ICD-10-CM    1  Post covid-19 condition, unspecified  U09 9    2  Fatigue, unspecified type  R53 83          Assessment  Assessment details: Patient is a 68 y o  Male who presents to skilled outpatient PT with fatigue and lethargy following infection due to COVID-19 in 2021  Since acute illness and related hospitalization patient has experienced significant reduction in functional status due to fatigue and shortness of breath  Patient is unable to work at this time due to these symptoms and has been unable to participate in daily health maintenance activities such as walking  Patient presents with good LE strength per 5/5 MMTs throughout  However, 5xSTS time of 12 6 seconds is above age-matched norm of 11 1 seconds, indicating decreased LE power  Patient able to complete 6MWT with 1075 feet ambulated; however, pt notably fatigued after with SOB which resolved after approximately 2 minutes seated  This distance is significantly below age- and gender-matched norm of 1729 feet and indicates decreased cardiovascular endurance    Patient present at risk of falls with impaired dynamic balance per FGA and DGI scores of 18/30 and 18/24 respectively  Pt will undoubtedly benefit from skilled physical therapy to improve endurance, balance, and functional strength for return to PLOF, work, and health maintenance/exercise activities  Please contact me if you have any questions or recommendations  Thank you for the referral and the opportunity to share in [address]  care  Patient verbalized understanding of POC        Impairments: Activity intolerance, Impaired balance, Lacks appropriate HEP and Safety issue  Understanding of Dx/Px/POC: Good  Prognosis: Good      Cut off score   All date taken from APTA Neuro Section or Rehab Measures      Ramsey/56  MDC: 6 pts  Age Norms:  61-76: M - 54   F - 55  70-79: M - 47   F - 53  80-89: M - 48   F - 50 5xSTS: Fransisco et al 2010  MDC: 2 3 sec  Age Norms:  60-69: 11 1 sec  70-79: 12 6 sec  80-: 14 8 sec   TUG  MDC: 4 14 sec  Cut off score:  >13 5 sec community dwelling adults  >32 2 frail elderly  <20 I for basic transfers  >30 dependent on transfers 10 Meter Walk Test: Sirena lin   20-29: M - 1 35 m   F - 1 34 m  30-39: M - 1 43 m   F - 1 34 m  40-49: M - 1 43 m   F - 1 39 m  50-59: M - 1 43 m   F - 1 31 m  60-69: M - 1 34 m   F - 1 24 m  70-79: M - 1 26 m   F - 1 13 m  80-89: M - 0 97 m   F - 0 94 m   FGA  MCID: 4 pts  Geriatrics/community < 22/30 fall risk  Geriatrics/community < 20/30 unexplained falls    DGI  MDC: vestibular - 4 pts  MDC: geriatric/community - 3 pts  Falls risk <19/24 mCTSIB  Norm: 20-60 yrs  Eyes open firm: norm sway 0 21-0 48  Eyes closed firm: norm sway 0 48-0 99  Eyes open foam: norm sway 0 38-0 71  Eyes closed foam: norm sway 0 70-2 22   6 Minute Walk Test  MDC: 190 98 ft  MCID: 164 ft    Age Norms  61-76: M - 1876 ft (571 80 m)  F - 1765 ft (537 98 m)  70-79: M - 1729 ft (527 00 m)  F - 1545 ft (470 92 m)  80-89: M - 1368 ft (416 97 m)  F - 1286 ft (391 97 m) ABC: Cyril Sandifer & Annalee, 2003  <67% increased risk for falls           Goals  Short Term Goals (4-6 weeks):  - Patient will be independent with simple HEP  - Patient will improve with DGI by 3 points per [address] to promote improved safety with dynamic tasks  - Patient will improve FGA score by 4 points per [address] to promote improved safety with dynamic tasks  - Patient will improve 6 Minute Walk Test score by 190 feet from 1075 feet to 1265 feet to promote improved cardiovascular endurance      Long Term Goals (12 weeks):  - Patient will be independent with complex HEP  - Patient will be able to perform 15 minutes of aerobic activity at HR 70% max to facilitate return to sport/normal functional tasks  - Patient will complete work related tasks without exacerbation of symptoms in order to maximize function and promote return to work  - Patient will complete at least 1729 feet in 6 Minute Walk Test to meet age- and gender-matched norm for cardiovascular endurance  - Patient will report going on walks at least 3 days per week to promote independence and improved cardiovascular endurance  - Patient will report ability to return to work without restriction to demonstrate return to [facility] details: skilled physical therapy + skilled occupational therapy   Patient would benefit from: Skilled PT and Skilled OT  Planned modality interventions: Biofeedback  Planned therapy interventions: Balance, Breathing training, Body mechanics training, Gait training, HEP, Manual therapy, Neuromuscular re-education, Patient education, Strengthening, Stretching, Therapeutic activities, Therapeutic exercises and Work reintegration  Frequency: 2x/wk  Duration in weeks: 12  Plan of Care beginning date: 1/6/22  Plan of Care expiration date: 12 weeks - 3/31/22  Treatment plan discussed with: patient        Subjective Evaluation    History of Present Illness  Mechanism of injury: Pt presents to therapy following COVID in early December and subsequent UTI which he is following with urologist  Patient was hospitalized 12/13 to 12/17 and has felt significant fatigue since then  Patient frequently feels like "heart is racing fast" with activity and he feels lethargic  Patient also notes "wheezing" in chest and takes medication for cough  Patient is not working due to this condition due to being physically unable to climb several stories in fatigued state; hoping to return to work by end of January  Patient notes that when he had covid he felt withdrawn from others but feels back to normal now  Pt goal for therapy is to "snap out of this condition and get back to work "    Dizziness Subjective  None reported  Pain  Nothing relevant (hx of arthritis)  Wearing B/L knee braces due to arthritis  Social Support  Steps to enter house: 2+1 without railing  Stairs in house: has 2nd floor but does not need to access it  Lives in: house  Lives with: alone    Employment status: [employer], not working currently due to [employer] dominance: [employer]    Treatments  Previous treatment: Occupational therapy   Current treatment: PT + OT  Diagnostic Testing: COVID testing, urine culture       Objective     Yellow Flag Question:     - Were you in the ICU? No   - Were you on a ventilator? No   (if yes continue with  PTSD, PHQ -9 screenings)     - Do you feel that your voice has changed? No  - Are you having difficulty with swallowing? No   (if yes referral to speech therapy)    - Difficulty with participation in ADL and IADL? Yes - attending OT   (if yes determine if referral to OT is appropriate)    - Breathing Difficulty?  - Has pulmonary function test been completed? No   (If yes continue with breathing evaluation)    Bowel/Bladder changes: (referral for pelvic therapy)   - Have you had any new onset of urinary or bowel leakage, EVEN JUST A LITTLE BIT, since onset of Covid? Yes - may be related to urinary tract issues  - Have you had any new difficulty with starting a urine stream or a bowel movement since onset of Covid?  No       Objective:      Vitals:   - BP: 138/80   - HR: 90 bpm  - SP02: 94%    LE MMT:  - R Hip Flexion: 5/5  L Hip Flexion: 5/5  - R Hip Extension: 5/5  L Hip Extension: 5/5  - R Hip Abduction: 5/5  L Hip Abduction: 5/5  - R Hip Adduction: 5/5  L Hip Adduction: 5/5  - R Knee Extension: 5/5 L Knee Extension: 5/5  - R Knee Flexion: 5/5  L Knee Flexion: 5/5  - R Ankle DF: 5/5  L Ankle DF: 5/5  - R Ankle PF: 5/5  L Ankle PF: 5/5      Functional Outcome Measures:  - 6 Minute Walk Test: 1075 ft  - Gait Speed : 1 13 m/s  - 5x Sit to stand: 13 6 sec  - FGA  score: 18/30       Breathing evaluation:     Describe breathing: via: room air    Breathing pattern: WNL      Outcome Measures Initial Eval  1/6/22        5xSTS 13 6 sec        DGI  FGA 18/24 18/30        10 meter 1 13 m/s        6MWT 1075 ft        SLUMS NT/30                                                          Precautions: HTN  Past Medical History:   Diagnosis Date    Arthritis     BPH (benign prostatic hypertrophy)     Hx post laser TURP    GERD (gastroesophageal reflux disease)     H/O exercise stress test     Hyperlipidemia     Hypertension     Morbid obesity (HCC)     Last Assessed:12/21/2016 ;     Plantar fascial fibromatosis     Onset:1/23/2012    PONV (postoperative nausea and vomiting)     Sleep apnea     does not wear CPAP

## 2022-01-08 ENCOUNTER — TELEPHONE (OUTPATIENT)
Dept: FAMILY MEDICINE CLINIC | Facility: CLINIC | Age: 77
End: 2022-01-08

## 2022-01-10 ENCOUNTER — OFFICE VISIT (OUTPATIENT)
Dept: OCCUPATIONAL THERAPY | Facility: CLINIC | Age: 77
End: 2022-01-10
Payer: COMMERCIAL

## 2022-01-10 ENCOUNTER — OFFICE VISIT (OUTPATIENT)
Dept: PHYSICAL THERAPY | Facility: CLINIC | Age: 77
End: 2022-01-10
Payer: COMMERCIAL

## 2022-01-10 ENCOUNTER — TELEPHONE (OUTPATIENT)
Dept: FAMILY MEDICINE CLINIC | Facility: CLINIC | Age: 77
End: 2022-01-10

## 2022-01-10 ENCOUNTER — OFFICE VISIT (OUTPATIENT)
Dept: FAMILY MEDICINE CLINIC | Facility: CLINIC | Age: 77
End: 2022-01-10
Payer: COMMERCIAL

## 2022-01-10 VITALS
WEIGHT: 226 LBS | DIASTOLIC BLOOD PRESSURE: 80 MMHG | BODY MASS INDEX: 33.47 KG/M2 | TEMPERATURE: 98.8 F | RESPIRATION RATE: 20 BRPM | SYSTOLIC BLOOD PRESSURE: 122 MMHG | HEART RATE: 86 BPM | HEIGHT: 69 IN

## 2022-01-10 DIAGNOSIS — U07.1 COVID-19: Primary | ICD-10-CM

## 2022-01-10 DIAGNOSIS — U09.9 POST COVID-19 CONDITION, UNSPECIFIED: Primary | ICD-10-CM

## 2022-01-10 DIAGNOSIS — R53.83 FATIGUE, UNSPECIFIED TYPE: ICD-10-CM

## 2022-01-10 DIAGNOSIS — R53.83 OTHER FATIGUE: ICD-10-CM

## 2022-01-10 DIAGNOSIS — U09.9 POST-COVID-19 CONDITION: ICD-10-CM

## 2022-01-10 DIAGNOSIS — R06.2 WHEEZING: Primary | ICD-10-CM

## 2022-01-10 PROCEDURE — 97530 THERAPEUTIC ACTIVITIES: CPT

## 2022-01-10 PROCEDURE — 1036F TOBACCO NON-USER: CPT | Performed by: NURSE PRACTITIONER

## 2022-01-10 PROCEDURE — 99213 OFFICE O/P EST LOW 20 MIN: CPT | Performed by: NURSE PRACTITIONER

## 2022-01-10 PROCEDURE — 97110 THERAPEUTIC EXERCISES: CPT

## 2022-01-10 PROCEDURE — 1111F DSCHRG MED/CURRENT MED MERGE: CPT | Performed by: NURSE PRACTITIONER

## 2022-01-10 PROCEDURE — 97112 NEUROMUSCULAR REEDUCATION: CPT

## 2022-01-10 PROCEDURE — 1160F RVW MEDS BY RX/DR IN RCRD: CPT | Performed by: NURSE PRACTITIONER

## 2022-01-10 RX ORDER — FLUTICASONE PROPIONATE 220 UG/1
2 AEROSOL, METERED RESPIRATORY (INHALATION) 2 TIMES DAILY
Qty: 12 G | Refills: 0 | Status: SHIPPED | OUTPATIENT
Start: 2022-01-10 | End: 2022-02-07

## 2022-01-10 RX ORDER — MELOXICAM 15 MG/1
15 TABLET ORAL DAILY
COMMUNITY
End: 2022-04-25 | Stop reason: SDUPTHER

## 2022-01-10 RX ORDER — CEFUROXIME AXETIL 500 MG/1
500 TABLET ORAL 2 TIMES DAILY
COMMUNITY
Start: 2022-01-07 | End: 2022-01-27

## 2022-01-10 NOTE — PROGRESS NOTES
Daily Note     Today's date: 1/10/2022  Patient name: Yadiel Weldon  : 1945  MRN: [id number]  Referring provider: Gaby Alonzo, DO  Dx:   Encounter Diagnoses   Name Primary?  COVID-19 Yes    Other fatigue      Start Time: 1400  Stop Time: 56  Total time in clinic (min): 45 minutes     Precautions: s/p COVID- poor activity tolerance  Visit 2   PN due 22  POC valid 22    Subjective: Pt reports constant fatigue and decreased sleep  Reports that he feels he is sleeping lightly and not feeling re-energized       Objective: See treatment below   -kitchen shelves worksheet to work on working memory, problem solving, following written instructions  Completed with mod VCs   -multimatrix with numbers and letters to name foods  Completed with max A for naming foods starting with a letter  2 VC for divided attention    Assessment: Tolerated treatment well  Pt demonstrating difficulty with word finding  Pt would benefit from continued OT services to address cognition, UE strength  Plan: Continued skilled OT per POC

## 2022-01-10 NOTE — PROGRESS NOTES
Assessment/Plan:    1  Wheezing  -     fluticasone (Flovent HFA) 220 mcg/act inhaler; Inhale 2 puffs 2 (two) times a day Rinse mouth after use  2  Post-COVID-19 condition  -     fluticasone (Flovent HFA) 220 mcg/act inhaler; Inhale 2 puffs 2 (two) times a day Rinse mouth after use  Patient Instructions:  Supportive care discussed and advised  Advised to RTO for any worsening and no improvement  Follow up for no improvement and worsening of conditions  Patient advised and educated when to see immediate medical care  Increase fluid intake, saline nasal rinses, and hot tea with honey and lemon  Cool air humidification can be helpful as well  May take Tylenol as needed for pain or fevers  Mucinex D for sinus congestion or Coricidin HBP if you have high blood pressure or a heart condition  Mucinex or Robitussin DM are effective for cough and chest congestion  Return if symptoms worsen or fail to improve  Future Appointments   Date Time Provider Omar Ma   1/10/2022  2:00 PM [place], [facility]   1/10/2022  2:45 PM Mayur Payne, [address]  [facility]   1/12/2022  2:00 PM Yanely Hammer PT [facility]   1/12/2022  2:45 PM Author Lisette OT [facility]   1/17/2022  2:15 PM Yanely Hammer PT [facility]   1/19/2022  2:15 PM Yanely Hammer PT [facility]   1/19/2022  3:00 PM Author Lisette OT [facility]   2/1/2022 11:00 AM DO [facility]           Subjective:      Patient ID: Yuniel Jimenez is a 68 y o  male  Chief Complaint   Patient presents with    Cough     off white color  ac/lpn    Wheezing    Chills     last night    scratchy throat         Vitals:  /80   Pulse 86   Temp 98 8 °F (37 1 °C)   Resp 20   Ht 5' 9" (1 753 m)   Wt 103 kg (226 lb)   BMI 33 37 kg/m²     HPI  Patient was diagnosed with covid-19 on 12/7/2021    Having cough with phlegm with wheezing at times  Might of had fever last night but did not checked it  Started ceftin on 1/7/2022 for prostatis for 20 days  Denies any sob and chest pain  Using proair as needed  The following portions of the patient's history were reviewed and updated as appropriate: allergies, current medications, past family history, past medical history, past social history, past surgical history and problem list       Review of Systems   Constitutional: Negative for chills, diaphoresis, fatigue, fever and unexpected weight change  HENT: Positive for congestion  Negative for dental problem, drooling, ear discharge, ear pain, facial swelling, hearing loss, mouth sores, nosebleeds, postnasal drip, rhinorrhea, sinus pressure, sinus pain, sneezing, sore throat, tinnitus, trouble swallowing and voice change  Respiratory: Positive for cough and wheezing  Negative for chest tightness and shortness of breath  Cardiovascular: Negative  Gastrointestinal: Negative for abdominal pain, constipation, diarrhea, nausea and vomiting  Genitourinary: Negative  Skin: Negative  Neurological: Negative for dizziness, light-headedness and headaches  Objective:    Social History     Tobacco Use   Smoking Status Never Smoker   Smokeless Tobacco Never Used       Allergies:    Allergies   Allergen Reactions    Sulfa Antibiotics Hives     Reaction Date: [id number];          Current Outpatient Medications   Medication Sig Dispense Refill    albuterol (PROVENTIL HFA,VENTOLIN HFA) 90 mcg/act inhaler Inhale 2 puffs every 6 (six) hours as needed for wheezing or shortness of breath (swish/spit after use) 18 g 8    benzonatate (TESSALON) 200 MG capsule Take 1 capsule (200 mg total) by mouth 3 (three) times a day as needed for cough 40 capsule 0    cefuroxime (CEFTIN) 500 mg tablet Take 500 mg by mouth 2 (two) times a day      loratadine (CLARITIN) 10 mg tablet Take 1 tablet (10 mg total) by mouth daily 20 tablet 0    meloxicam (MOBIC) 15 mg tablet Take 15 mg by mouth daily      fluticasone (Flovent HFA) 220 mcg/act inhaler Inhale 2 puffs 2 (two) times a day Rinse mouth after use  12 g 0    phenazopyridine (PYRIDIUM) 200 mg tablet Take 1 tablet (200 mg total) by mouth 3 (three) times a day (Patient not taking: Reported on 1/10/2022 ) 6 tablet 0     No current facility-administered medications for this visit  Physical Exam  Vitals reviewed  Constitutional:       Appearance: Normal appearance  He is well-developed  HENT:      Head: Normocephalic  Right Ear: Tympanic membrane, ear canal and external ear normal       Left Ear: Tympanic membrane, ear canal and external ear normal       Nose: Nose normal       Right Sinus: No maxillary sinus tenderness or frontal sinus tenderness  Left Sinus: No maxillary sinus tenderness or frontal sinus tenderness  Mouth/Throat:      Mouth: No oral lesions  Pharynx: No oropharyngeal exudate or posterior oropharyngeal erythema  Cardiovascular:      Rate and Rhythm: Normal rate and regular rhythm  Heart sounds: Normal heart sounds  Pulmonary:      Effort: Pulmonary effort is normal       Breath sounds: Normal breath sounds  Musculoskeletal:         General: Normal range of motion  Cervical back: Neck supple  Lymphadenopathy:      Cervical:      Right cervical: No superficial or posterior cervical adenopathy  Left cervical: No superficial or posterior cervical adenopathy  Skin:     General: Skin is warm and dry  Neurological:      Mental Status: He is alert and oriented to person, place, and time  Psychiatric:         Behavior: Behavior normal          Thought Content:  Thought content normal          Judgment: Judgment normal                      JHONATAN Ambriz

## 2022-01-10 NOTE — PROGRESS NOTES
Daily Note         Insurance:  AMA/CMS Eval/ Re-eval POC expires Marlen Ortiz #/ Referral # Total units  Start date  Expiration date Extension  Visit limitation? PT only or  PT+OT? Co-Insurance   Cigna: AMA 2022 3/31/22  Not required NA NA NA NA 60 PCY combined PT/OT/ST Both $10                                                                  Date 1/6 1/10             Visits: 60 visits PCY combined Used 1 1             Authed:  Remaining  59 58                  Date               Units:  Used               Authed:  Remaining                           Today's date: 1/10/2022  Patient name: Rupesh Armando  : 1945  MRN: [id number]  Referring provider: Sona Davis DO  Dx:   Encounter Diagnosis     ICD-10-CM    1  Post covid-19 condition, unspecified  U09 9    2  Fatigue, unspecified type  R53 83                   Subjective: Patient reports from OT with no new complaints  Objective: See treatment diary below    TA  - EDU on pacing/HEP for home    TE  - STS w/ 10# med ball until fatigue x2 sets , dyspnea 4-5/10 @ end  - Side stepping on foam beam(2) x2 mins , dyspnea  3/10 @ end      NMR (3lb B/L ankle weights)  - Modified tandem @ // bars (occasional LT // bars) x2 mins, dyspnea  3-4/10 @ end  - Step up BOSU + high knee drives x2 mins , dyspnea  3-4/10 @ end  - Step LAT up/over on BOSU  x2 mins, CGA dyspnea  3-4/10 @ end  - Karaoke x20 ft 2 mins , dyspnea  3-4/10 @ end  Assessment: Tolerated treatment well  Patient educated on dyspnea scale and importance of pacing overall activities  Focused on endurance and dynamic balance tasks with good tolerance from patient this session  Patient required CGA with lateral step up/over on BOSU this session, but able to correct all LOB independently  Patient would benefit from continued PT with focus on endurance to enable him to return to PLOF  Plan: Continue per plan of care        Precautions:   Diagnosis Date Comment Source   Arthritis   Provider   BPH (benign prostatic hypertrophy)  Hx post laser TURP Provider   GERD (gastroesophageal reflux disease)   Provider   H/O exercise stress test   Provider   Hyperlipidemia   Provider   Hypertension   Provider   Morbid obesity ([facility] [address] )  Last Assessed:12/21/2016 ;  Provider   Plantar fascial fibromatosis  Onset:1/23/2012 Provider   Sleep apnea  does not wear CPAP Provider       TE:  scap retraction: 2 x 10 3"    [place] BL Rows: 8# 2 x 10 -pain free  [place] IR 4# 2x10 - not today due to pain Table slide flexion 20 x 5"   Wand Extension: 2 x 10 5"  Wand ER shoulder at 0 deg: 1 x 10 5" hold R/L  RTC isometrics 5" hold x 10 each way   Corner stretch:10"x10       NM:  Repeated cervical SBing to L: 2 x 10-NC  LS stretch: 5 x 10" B/L pain free  Repeated cervical Sbing to L with self OP: pain free 2  X 10-reduction in pain   Ext SNAGS-PLAN  Rot SNAGS-PLAN  [place] UL Rows with Thoracic Rotation: 4# 2 x 10 on R; 2 x10 L  Modalities:  MHP: post tx to c -spine 8' skin intact pre and post    Pre Mod: post t x to c spine 10' (patient cleared from contraindications, PMH reviewed and verbalized no contraindications present when asked by PT)    SKIN INTACT PRE AND POST    Updated HEP: 1/9/20- repeated cervical SBing with GENTLE self OP every 2 hours- STOP IF SYMPTOMS WORSEN  Not today:  Seated TB rows Red TB 2x10    UT stretch: BL 5 x 10" pain free  Cervical spine isometrics 5" x 10 each way

## 2022-01-10 NOTE — TELEPHONE ENCOUNTER
Paperwork for disability a week ago in an envelope  He dropped it off and hasn't heard a work about it  Does Dr Destiny Diehl have these forms?     Please call patient back     Thank you

## 2022-01-12 ENCOUNTER — OFFICE VISIT (OUTPATIENT)
Dept: OCCUPATIONAL THERAPY | Facility: CLINIC | Age: 77
End: 2022-01-12
Payer: COMMERCIAL

## 2022-01-12 ENCOUNTER — OFFICE VISIT (OUTPATIENT)
Dept: PHYSICAL THERAPY | Facility: CLINIC | Age: 77
End: 2022-01-12
Payer: COMMERCIAL

## 2022-01-12 DIAGNOSIS — U09.9 POST COVID-19 CONDITION, UNSPECIFIED: Primary | ICD-10-CM

## 2022-01-12 DIAGNOSIS — R53.83 OTHER FATIGUE: ICD-10-CM

## 2022-01-12 DIAGNOSIS — U07.1 COVID-19: Primary | ICD-10-CM

## 2022-01-12 DIAGNOSIS — R53.83 FATIGUE, UNSPECIFIED TYPE: ICD-10-CM

## 2022-01-12 PROCEDURE — 97530 THERAPEUTIC ACTIVITIES: CPT

## 2022-01-12 PROCEDURE — 97110 THERAPEUTIC EXERCISES: CPT

## 2022-01-12 PROCEDURE — 97112 NEUROMUSCULAR REEDUCATION: CPT

## 2022-01-12 NOTE — PROGRESS NOTES
Daily Note     Today's date: 2022  Patient name: Fabien Goldman  : 1945  MRN: [id number]  Referring provider: Cynthia Crawford,   Dx:   Encounter Diagnoses   Name Primary?  COVID-19 Yes    Other fatigue                  Precautions: s/p COVID- poor activity tolerance  Visit 2   PN due 22  POC valid 22    Subjective: Pt reports constant fatigue and decreased sleep  Reports that he feels he is sleeping lightly and not feeling re-energized       Objective: See treatment below  -Number/letter grids alt in ascending order with focus on working memory, alternating and divided attn  Loses place in sequence 3x, externally distracted by other pts in neuro gym while he is in semi modal environment     -12 item recall with education on chunking and rehearsal memory strategies to inc immediate and delayed recall  With use of internal memory strategies, recalls 8/12 items  With 1 category cue, recalls additional 3 items  Delayed 10 min:    -Dog pile with focus on problem solving,   Completes with 1 cue for problem solving    Assessment: Tolerated treatment well  Pt internally and externally distracted throughout and demonstrates difficulty with alternating attn  Plan: Continued skilled OT per POC

## 2022-01-12 NOTE — PROGRESS NOTES
Daily Note         Insurance:  AMA/CMS Eval/ Re-eval POC expires Doc Flatten #/ Referral # Total units  Start date  Expiration date Extension  Visit limitation? PT only or  PT+OT? Co-Insurance   Cigna: AMA 2022 3/31/22  Not required NA NA NA NA 60 PCY combined PT/OT/ST Both $10                                                                  Date 1/6 1/10 1/12            Visits: 60 visits PCY combined Used 1             Authed:  Remaining  59 58 57                 Date               Units:  Used               Authed:  Remaining                           Today's date: 2022  Patient name: Tony Hall  : 1945  MRN: [id number]  Referring provider: Kang Schwarz DO  Dx:   Encounter Diagnosis     ICD-10-CM    1  Post covid-19 condition, unspecified  U09 9    2  Fatigue, unspecified type  R53 83                   Subjective: Patient reports to session 10 minutes late and notes that he thinks medicine makes him feel "off "       Objective: See treatment diary below    Pre vitals: 73 bpm, 97%    TE  - STS w/ 10# med ball until fatigue x 3 sets , dyspnea 5/10 @ end  - Side shuffling between cones with LE taps x 2 min, dyspnea 5/10 @ end   - MB slams, 1 min, dyspnea 5/10 @ end     TM Training:   - 2 min @ 2 0 mph x 3 sets, desaturated to low of 89%       Assessment: Patient tolerated session well today with focus on endurance  Patient reached "severe" level on dyspnea scale ranked 5/10 and desaturated to a low of 89%  Provided pt with continued education regarding pacing and energy conservation  Pt will benefit from continued skilled therapy to increase cardiovascular endurance for return to PLOF  Plan: Continue per plan of care          Outcome Measures Initial Eval  22        5xSTS 13 6 sec        DGI  FGA         10 meter 1 13 m/s        6MWT 1075 ft        SLUMS                                                           Precautions: HTN  Past Medical History:   Diagnosis Date    Arthritis     BPH (benign prostatic hypertrophy)     Hx post laser TURP    GERD (gastroesophageal reflux disease)     H/O exercise stress test     Hyperlipidemia     Hypertension     Morbid obesity ([facility] [address] )     Last Assessed:12/21/2016 ;     Plantar fascial fibromatosis     Onset:1/23/2012    PONV (postoperative nausea and vomiting)     Sleep apnea     does not wear CPAP

## 2022-01-15 ENCOUNTER — TELEPHONE (OUTPATIENT)
Dept: FAMILY MEDICINE CLINIC | Facility: CLINIC | Age: 77
End: 2022-01-15

## 2022-01-15 NOTE — TELEPHONE ENCOUNTER
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

## 2022-01-17 ENCOUNTER — APPOINTMENT (OUTPATIENT)
Dept: PHYSICAL THERAPY | Facility: CLINIC | Age: 77
End: 2022-01-17
Payer: COMMERCIAL

## 2022-01-17 DIAGNOSIS — G47.33 OBSTRUCTIVE SLEEP APNEA: Primary | ICD-10-CM

## 2022-01-17 NOTE — TELEPHONE ENCOUNTER
Please let him know I will print a referral to the  sleep center for him to call and arrange testing through them since I do not order the test or type of test

## 2022-01-17 NOTE — TELEPHONE ENCOUNTER
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

## 2022-01-18 ENCOUNTER — OFFICE VISIT (OUTPATIENT)
Dept: PHYSICAL THERAPY | Facility: CLINIC | Age: 77
End: 2022-01-18
Payer: COMMERCIAL

## 2022-01-18 DIAGNOSIS — U09.9 POST COVID-19 CONDITION, UNSPECIFIED: Primary | ICD-10-CM

## 2022-01-18 DIAGNOSIS — R53.83 FATIGUE, UNSPECIFIED TYPE: ICD-10-CM

## 2022-01-18 PROCEDURE — 97110 THERAPEUTIC EXERCISES: CPT | Performed by: PHYSICAL THERAPIST

## 2022-01-18 PROCEDURE — 97112 NEUROMUSCULAR REEDUCATION: CPT | Performed by: PHYSICAL THERAPIST

## 2022-01-18 NOTE — PROGRESS NOTES
Daily Note         Insurance:  AMA/CMS Eval/ Re-eval POC expires Praveen Verde #/ Referral # Total units  Start date  Expiration date Extension  Visit limitation? PT only or  PT+OT? Co-Insurance   Cigna: [facility] 2022 3/31/22  Not required NA NA NA NA 60 PCY combined PT/OT/ST Both $10                                                                  Date 1/6 1/10 1/12 1/18           Visits: 60 visits PCY combined Used            Authed:  Remaining  59 58 57 56                Date               Units:  Used               Authed:  Remaining                           Today's date: 2022  Patient name: Tamara Temple  : 1945  MRN: [id number]  Referring provider: Deshaun Nelson DO  Dx:   Encounter Diagnosis     ICD-10-CM    1  Post covid-19 condition, unspecified  U09 9    2  Fatigue, unspecified type  R53 83                   Subjective: Patient reports to session with reports of moderate fatigue  Objective: See treatment diary below    Pre vitals: 73 bpm, 97%    NMR   - STS w/ 10# med ball until fatigue x 20 , dyspnea 3/10 @ end  - Side stepping with 10# med ball slams x 30 feet x 4, dyspnea 4/10 @ end  - Step ups onto bosu ball with posterior yellow resistance band x20 x 2 dyspnea 3/10 @ end  - Step over bosu bal 45 seconds x 2 dyspnea 4/10 @ end    TM Training:   - 5 minutes @ 2mph Dyspnea 4/10  SPO2 94% dyspnea 5/10   - 2 minutes @ 1 5 cool down        Assessment: Patient tolerated session well today with focus on endurance  Pt required less frequent rest breaks throughout session  Tolerated increased duration of treadmill ambulation to 5 minutes without rest break and dyspnea only reaching 5/10 with improved vital sign of SPO2 of 94% at end  Pt demonstrated some difficulty with balance activity involving posterior resistance with frequent stepping strategy to recover balance  Future sessions plan to incorporate balance challenges into aerobic activity   Pt will benefit from continued skilled therapy to increase cardiovascular endurance for return to PLOF  Plan: Continue per plan of care          Outcome Measures Initial Eval  1/6/22        5xSTS 13 6 sec        DGI  FGA 18/24 18/30        10 meter 1 13 m/s        6MWT 1075 ft        SLUMS NT/30                                                          Precautions: HTN  Past Medical History:   Diagnosis Date    Arthritis     BPH (benign prostatic hypertrophy)     Hx post laser TURP    GERD (gastroesophageal reflux disease)     H/O exercise stress test     Hyperlipidemia     Hypertension     Morbid obesity ([address] )     Last Assessed:12/21/2016 ;     Plantar fascial fibromatosis     Onset:1/23/2012    PONV (postoperative nausea and vomiting)     Sleep apnea     does not wear CPAP

## 2022-01-19 ENCOUNTER — APPOINTMENT (OUTPATIENT)
Dept: OCCUPATIONAL THERAPY | Facility: CLINIC | Age: 77
End: 2022-01-19
Payer: COMMERCIAL

## 2022-01-19 ENCOUNTER — TELEPHONE (OUTPATIENT)
Dept: FAMILY MEDICINE CLINIC | Facility: CLINIC | Age: 77
End: 2022-01-19

## 2022-01-19 ENCOUNTER — ESTABLISHED COMPREHENSIVE EXAM (OUTPATIENT)
Dept: URBAN - METROPOLITAN AREA CLINIC 6 | Facility: CLINIC | Age: 77
End: 2022-01-19

## 2022-01-19 ENCOUNTER — APPOINTMENT (OUTPATIENT)
Dept: PHYSICAL THERAPY | Facility: CLINIC | Age: 77
End: 2022-01-19
Payer: COMMERCIAL

## 2022-01-19 DIAGNOSIS — H25.813: ICD-10-CM

## 2022-01-19 DIAGNOSIS — H47.321: ICD-10-CM

## 2022-01-19 DIAGNOSIS — H35.363: ICD-10-CM

## 2022-01-19 PROCEDURE — 92134 CPTRZ OPH DX IMG PST SGM RTA: CPT

## 2022-01-19 PROCEDURE — 92014 COMPRE OPH EXAM EST PT 1/>: CPT

## 2022-01-19 ASSESSMENT — VISUAL ACUITY
OU_SC: J2
OD_SC: 20/25-2
OS_SC: 20/30+1

## 2022-01-19 ASSESSMENT — TONOMETRY
OS_IOP_MMHG: 8
OD_IOP_MMHG: 10

## 2022-01-19 NOTE — TELEPHONE ENCOUNTER
Patient states he was dx with sleep apnea a few years ago  He is a  and was referred to the sleep center, but they can not get him in until July  His certification is due to be renewed in April so he needs the appointment to be done before then or it will   He wants to know if Dr Cheryl Nieto can get him an appointment earlier? Please advise

## 2022-01-19 NOTE — TELEPHONE ENCOUNTER
Called the sleep center, they stated they have appointments available way before July  Gave patient the number I called on his referral to schedule his appointment  SEYMOUR Moran LPN

## 2022-01-20 ENCOUNTER — OFFICE VISIT (OUTPATIENT)
Dept: PHYSICAL THERAPY | Facility: CLINIC | Age: 77
End: 2022-01-20
Payer: COMMERCIAL

## 2022-01-20 ENCOUNTER — OFFICE VISIT (OUTPATIENT)
Dept: OCCUPATIONAL THERAPY | Facility: CLINIC | Age: 77
End: 2022-01-20
Payer: COMMERCIAL

## 2022-01-20 DIAGNOSIS — R53.83 FATIGUE, UNSPECIFIED TYPE: ICD-10-CM

## 2022-01-20 DIAGNOSIS — U09.9 POST COVID-19 CONDITION, UNSPECIFIED: Primary | ICD-10-CM

## 2022-01-20 DIAGNOSIS — R53.83 OTHER FATIGUE: ICD-10-CM

## 2022-01-20 DIAGNOSIS — U07.1 COVID-19: Primary | ICD-10-CM

## 2022-01-20 PROCEDURE — 97530 THERAPEUTIC ACTIVITIES: CPT | Performed by: OCCUPATIONAL THERAPIST

## 2022-01-20 PROCEDURE — 97112 NEUROMUSCULAR REEDUCATION: CPT

## 2022-01-20 NOTE — PROGRESS NOTES
Daily Note     Today's date: 2022  Patient name: Liban Mathias  : 1945  MRN: [id number]  Referring provider: Gerardo Raman DO  Dx:   Encounter Diagnoses   Name Primary?  COVID-19 Yes    Other fatigue      Start Time: 848  Stop Time: 930  Total time in clinic (min): 42 minutes     Precautions: s/p COVID- poor activity tolerance  Visit 5  PN due 22  POC valid 22    Subjective: Pt reports that he went back to work this week  He also states that he hasn't been completing theraputty exercises for HEP  Objective: See treatment below  -Multimatrix with visual closure card and lettered blocks focusing on divided attention  Pt omitted 1 shape and required maxA to fix errors  Also required ~25 minutes to complete   -Embedded words worksheet focusing on divided attention  Required multiple repetitions of directions for comprehension  Did not complete during session so instructed to finish as part of HEP  Also provided message decoding worksheet focusing on sustained attention for HEP  Assessment: Tolerated treatment well  Pt demonstrates difficulty with alternating attn  Plan: Continued skilled OT per POC

## 2022-01-20 NOTE — PROGRESS NOTES
Daily Note         Insurance:  AMA/CMS Eval/ Re-eval POC expires Shahnaz Medina #/ Referral # Total units  Start date  Expiration date Extension  Visit limitation? PT only or  PT+OT? Co-Insurance   Cigna: [facility] 2022 3/31/22  Not required NA NA NA NA 60 PCY combined PT/OT/ST Both $10                                                                  Date 1/6 1/10 1/12 1/18 1/19          Visits: 60 visits PCY combined > approx 30 for PT! Used           Authed:  Remaining  59 58 57 56 55               Date               Units:  Used               Authed:  Remaining                           Today's date: 2022  Patient name: Gerardo Yost  : 1945  MRN: [id number]  Referring provider: Melissa Amin DO  Dx:   Encounter Diagnosis     ICD-10-CM    1  Post covid-19 condition, unspecified  U09 9    2  Fatigue, unspecified type  R53 83                   Subjective: Patient arrives 20 minutes late for session  Patient reports no change in status or complaint  Objective: See treatment diary below    Pre vitals: 86 bpm, 94%    NMR   - STS with posterior resistance (green), 10x  - STS with posterior resistance (green) > 3 bicep curls with 5# DBs, 20 cycles    TM Training:   - 10 minutes @ 2mph Dyspnea 4/10  SPO2 94% dyspnea 3/10        Assessment: Patient tolerated session well today with continued focus on endurance  Patient able to complete uninterrupted 10 minutes on TM with only 3/10 dyspnea and no desaturation from pre-session level, suggesting improving cardiovascular endurance  Truncated session due to late arrival but noted increased fatigue following incorporating UE strengthening into LE circuits  Patient will benefit from continued skilled therapy to increase cardiovascular endurance for return to PLOF  Plan: Continue per plan of care          Outcome Measures Initial Eval  22        5xSTS 13 6 sec        DGI  FGA         10 meter 1 13 m/s        6MWT 1075 ft MADELINE NT/30                                                          Precautions: HTN  Past Medical History:   Diagnosis Date    Arthritis     BPH (benign prostatic hypertrophy)     Hx post laser TURP    GERD (gastroesophageal reflux disease)     H/O exercise stress test     Hyperlipidemia     Hypertension     Morbid obesity ([address] )     Last Assessed:12/21/2016 ;     Plantar fascial fibromatosis     Onset:1/23/2012    PONV (postoperative nausea and vomiting)     Sleep apnea     does not wear CPAP

## 2022-01-24 ENCOUNTER — APPOINTMENT (OUTPATIENT)
Dept: PHYSICAL THERAPY | Facility: CLINIC | Age: 77
End: 2022-01-24
Payer: COMMERCIAL

## 2022-01-25 ENCOUNTER — OFFICE VISIT (OUTPATIENT)
Dept: OCCUPATIONAL THERAPY | Facility: CLINIC | Age: 77
End: 2022-01-25
Payer: COMMERCIAL

## 2022-01-25 ENCOUNTER — OFFICE VISIT (OUTPATIENT)
Dept: PHYSICAL THERAPY | Facility: CLINIC | Age: 77
End: 2022-01-25
Payer: COMMERCIAL

## 2022-01-25 DIAGNOSIS — U09.9 POST COVID-19 CONDITION, UNSPECIFIED: Primary | ICD-10-CM

## 2022-01-25 DIAGNOSIS — U07.1 COVID-19: Primary | ICD-10-CM

## 2022-01-25 DIAGNOSIS — R53.83 FATIGUE, UNSPECIFIED TYPE: ICD-10-CM

## 2022-01-25 DIAGNOSIS — R53.83 OTHER FATIGUE: ICD-10-CM

## 2022-01-25 PROCEDURE — 97112 NEUROMUSCULAR REEDUCATION: CPT

## 2022-01-25 PROCEDURE — 97530 THERAPEUTIC ACTIVITIES: CPT

## 2022-01-25 NOTE — PROGRESS NOTES
Daily Note     Today's date: 2022  Patient name: Josh Merino  : 1945  MRN: [id number]  Referring provider: Sylvia Carl DO  Dx:   Encounter Diagnoses   Name Primary?  COVID-19 Yes    Other fatigue                  Precautions: s/p COVID- poor activity tolerance  Visit 6  PN due 22  POC valid 22    Subjective:     Objective: See treatment below  -performed sustained attention task of decoding simple code with independence- provided HEP for last letter letter decoding  Performed first letter in clinic with 1 VC for keeping track    Performed card sorting 3 items and recall of 2 items focusing on memory, divided attention, EF skills  Required 1 VC for initiaion and recall between city vs food and required cues     Performed simple and moderately complex  skills task and EF skills tasks required min VCs for problem solving throughout     Assessment: Tolerated treatment well  Pt demonstrating difficulty with divided attention and required cues  Pt demonstrates difficulty with alternating attn  Plan: Continued skilled OT per POC

## 2022-01-25 NOTE — PROGRESS NOTES
Daily Note         Insurance:  AMA/CMS Eval/ Re-eval POC expires Rhiannon Mora #/ Referral # Total units  Start date  Expiration date Extension  Visit limitation? PT only or  PT+OT? Co-Insurance   Corin[place]: [place] 2022 3/31/22  Not required NA NA NA NA 60 PCY combined PT/OT/ST Both $10                                                                  Date 1/6 1/10 1/12 1/18 1/19          Visits: 60 visits PCY combined > approx 30 for PT! Used           Authed:  Remaining  59 58 57 56 55               Date               Units:  Used               Authed:  Remaining                           Today's date: 2022  Patient name: Jose L Keating  : 1945  MRN: [id number]  Referring provider: Santos Lara DO  Dx:   No diagnosis found  Subjective: Patient states that he is extremely tired and not from sessions yesterday but generalized fatigue despite going to bed early last night  He stated that he wanted to go back to bed when he woke up as he woke up repeatedly during the night and that he felt off all day  Objective: See treatment diary below    Pre vitals: 81 bpm, 95%    NMR   - STS with posterior resistance (green), 10x (93 bpm, 94%)  -10x STS with posterior resistance (green) > 1 bicep curls with 5# DBs, 8 cycles (100bpm, 95%)    TM Training:   - 10 minutes @ 2mph Dyspnea 2/10  SPO2 95% dyspnea 3/10        Assessment: Patient tolerated session well today with continued focus on endurance  Patient able to complete uninterrupted 10 minutes on TM with only 3/10 dyspnea and no desaturation from pre-session level, suggesting improving cardiovascular endurance  Progressed STS to 10x with 1x bicep curl for 8 cycles  Patient responded well and maintained saturation levels  Patient will benefit from continued skilled therapy to increase cardiovascular endurance for return to PLOF  Plan: Continue per plan of care          Outcome Measures Initial Eval  22        5xSTS 13 6 sec        DGI  FGA 18/24 18/30        10 meter 1 13 m/s        6MWT 1075 ft        [facility] NT/30                                                          Precautions: HTN  Past Medical History:   Diagnosis Date    Arthritis     BPH (benign prostatic hypertrophy)     Hx post laser TURP    GERD (gastroesophageal reflux disease)     H/O exercise stress test     Hyperlipidemia     Hypertension     Morbid obesity ([address] )     Last Assessed:12/21/2016 ;     Plantar fascial fibromatosis     Onset:1/23/2012    PONV (postoperative nausea and vomiting)     Sleep apnea     does not wear CPAP

## 2022-01-26 ENCOUNTER — OFFICE VISIT (OUTPATIENT)
Dept: PHYSICAL THERAPY | Facility: CLINIC | Age: 77
End: 2022-01-26
Payer: COMMERCIAL

## 2022-01-26 DIAGNOSIS — R53.83 FATIGUE, UNSPECIFIED TYPE: ICD-10-CM

## 2022-01-26 DIAGNOSIS — U09.9 POST COVID-19 CONDITION, UNSPECIFIED: Primary | ICD-10-CM

## 2022-01-26 PROCEDURE — 97112 NEUROMUSCULAR REEDUCATION: CPT

## 2022-01-26 PROCEDURE — 97110 THERAPEUTIC EXERCISES: CPT

## 2022-01-26 NOTE — PROGRESS NOTES
Daily Note   IE 22  POC: 3/31/22        Insurance:  AMA/CMS Eval/ Re-eval POC expires Ashutosh Brand #/ Referral # Total units  Start date  Expiration date Extension  Visit limitation? PT only or  PT+OT? Co-Insurance   Cigna: [facility] 2022 3/31/22  Not required NA NA NA NA 60 PCY combined PT/OT/ST Both $10                                                                  Date 1/6 1/10 1/12 1/18 1/19 1/25 1/26        Visits: 60 visits PCY combined > approx 30 for PT! Used 1         Authed:  Remaining  50 40 76 64 50 84 33             Date               Units:  Used               Authed:  Remaining                           Today's date: 2022  Patient name: Puja Ansari  : 1945  MRN: [id number]  Referring provider: Fernando Arguelles DO  Dx:   Encounter Diagnosis     ICD-10-CM    1  Post covid-19 condition, unspecified  U09 9    2  Fatigue, unspecified type  R53 83                   Subjective: Patient reports L sided back pain since this morning "I don't know if I did something when I was sleeping " It has been improving since this AM  Also notes potential onset of migraine  Patient arrived 5 minutes late to session  Objective: See treatment diary below    Pre vitals: 134/76, 94 bpm, 96%    NMR   - STS with 10# MB > MB toss, 2 min (133 bpm, 93%)  - Four square hurdles, 9", 2 min (124 bpm, 94%)  - Posteriorly resisted (red) fwd/back to cone taps, 2 min     TM Training:   - 4 min @ 2mph (2 min @ 0%, 2 min @ 1%)  SPO2 95%  (stopped due to bathroom needs, not fatigue)         Assessment: Patient tolerated session well today with continued focus on endurance  Session interrupted due to bathroom break but patient was tolerating incline on treadmill well  Plan to continue TM training with incline to maximally challenged pt  Patient was able to clear hurdles with only one near LOB resulting in knocking over winter   Patient challenged with grading eccentric force with backward walking against resistance  Patient will benefit from continued skilled therapy to improve endurance and dynamic balance to return to PLOF  Plan: Continue per plan of care          Outcome Measures Initial Eval  1/6/22        5xSTS 13 6 sec        DGI  FGA 18/24 18/30        10 meter 1 13 m/s        6MWT 1075 ft        SLUMS NT/30                                                          Precautions: HTN  Past Medical History:   Diagnosis Date    Arthritis     BPH (benign prostatic hypertrophy)     Hx post laser TURP    GERD (gastroesophageal reflux disease)     H/O exercise stress test     Hyperlipidemia     Hypertension     Morbid obesity ([address] )     Last Assessed:12/21/2016 ;     Plantar fascial fibromatosis     Onset:1/23/2012    PONV (postoperative nausea and vomiting)     Sleep apnea     does not wear CPAP

## 2022-01-27 ENCOUNTER — APPOINTMENT (OUTPATIENT)
Dept: PHYSICAL THERAPY | Facility: CLINIC | Age: 77
End: 2022-01-27
Payer: COMMERCIAL

## 2022-01-27 ENCOUNTER — APPOINTMENT (OUTPATIENT)
Dept: OCCUPATIONAL THERAPY | Facility: CLINIC | Age: 77
End: 2022-01-27
Payer: COMMERCIAL

## 2022-01-31 ENCOUNTER — APPOINTMENT (OUTPATIENT)
Dept: PHYSICAL THERAPY | Facility: CLINIC | Age: 77
End: 2022-01-31
Payer: COMMERCIAL

## 2022-02-01 ENCOUNTER — APPOINTMENT (OUTPATIENT)
Dept: PHYSICAL THERAPY | Facility: CLINIC | Age: 77
End: 2022-02-01
Payer: COMMERCIAL

## 2022-02-01 ENCOUNTER — APPOINTMENT (OUTPATIENT)
Dept: OCCUPATIONAL THERAPY | Facility: CLINIC | Age: 77
End: 2022-02-01
Payer: COMMERCIAL

## 2022-02-01 ENCOUNTER — OFFICE VISIT (OUTPATIENT)
Dept: OCCUPATIONAL THERAPY | Facility: CLINIC | Age: 77
End: 2022-02-01
Payer: COMMERCIAL

## 2022-02-01 ENCOUNTER — OFFICE VISIT (OUTPATIENT)
Dept: PHYSICAL THERAPY | Facility: CLINIC | Age: 77
End: 2022-02-01
Payer: COMMERCIAL

## 2022-02-01 DIAGNOSIS — R53.83 FATIGUE, UNSPECIFIED TYPE: ICD-10-CM

## 2022-02-01 DIAGNOSIS — R53.83 OTHER FATIGUE: ICD-10-CM

## 2022-02-01 DIAGNOSIS — U09.9 POST COVID-19 CONDITION, UNSPECIFIED: Primary | ICD-10-CM

## 2022-02-01 DIAGNOSIS — U07.1 COVID-19: Primary | ICD-10-CM

## 2022-02-01 PROCEDURE — 97112 NEUROMUSCULAR REEDUCATION: CPT

## 2022-02-01 PROCEDURE — 97530 THERAPEUTIC ACTIVITIES: CPT

## 2022-02-01 PROCEDURE — 97110 THERAPEUTIC EXERCISES: CPT

## 2022-02-01 NOTE — PROGRESS NOTES
Daily Note   IE 22  POC: 3/31/22        Insurance:  AMA/CMS Eval/ Re-eval POC expires Radhika Ramírez #/ Referral # Total units  Start date  Expiration date Extension  Visit limitation? PT only or  PT+OT? Co-Insurance   Cigna: [facility] 2022 3/31/22  Not required NA NA NA NA 60 PCY combined PT/OT/ST Both $10                                                                  Date 1/6 1/10 1/12 1/18 1/19 1/25 1/26        Visits: 60 visits PCY combined > approx 30 for PT! Used         Authed:  Remaining  23 69 45 56 41 00 23             Date               Units:  Used               Authed:  Remaining                           Today's date: 2022  Patient name: Shantel Peres  : 1945  MRN: [id number]  Referring provider: Ramírez Castillo DO  Dx:   Encounter Diagnosis     ICD-10-CM    1  Post covid-19 condition, unspecified  U09 9    2  Fatigue, unspecified type  R53 83                   Subjective: Patient reports that he feels " a little sluggish today"  Objective: See treatment diary below    Pre vitals: 134/76, 94 bpm, 96%    NMR   - STS with 10# MB, 2 min (103 bpm, 94%), dyspnea 4-5  - Step ups on BOSU FWD + B/L 3# ankle weights: 2 mins, HR 99 BPM, SPO2 94%, dyspnea 3  - Step ups on BOSU LAT + B/L 3# ankle weights: 2 mins, HR 98 BPM, SPO2 95%, dyspnea 2  - Side stepping w/ OH Med ball slam 10# x2 mins, , SPO2 93%, dyspnea 3   - Step taps @ TM: 2 mins, , SPO2 93%, dyspnea 3     TM Training:   - 11 min @ 2-2 5mph, 0-2% grade (2 min @ 0%, 2 min @ 1%) HR  SPO2 94-95%  (1 min cool down @ 1 5 mph) , dyspnea 1-2         Assessment: Patient tolerated session well today with continued focus on endurance  Patient able to tolerate increased incline, germán and duration this session with only reports of 1-2 on dyspnea scale  Patient most challenged with SOB when involving UE/LE functional movements (ie STS with OH medball hold)   Should continue to progress circuit training and next session keep B/L 3 lb ankle weights the entire session  Patient will benefit from continued skilled therapy to improve endurance and dynamic balance to return to PLOF  Plan: Continue per plan of care          Outcome Measures Initial Eval  1/6/22        5xSTS 13 6 sec        DGI  FGA 18/24 18/30        10 meter 1 13 m/s        6MWT 1075 ft        SLUMS NT/30                                                          Precautions: HTN  Past Medical History:   Diagnosis Date    Arthritis     BPH (benign prostatic hypertrophy)     Hx post laser TURP    GERD (gastroesophageal reflux disease)     H/O exercise stress test     Hyperlipidemia     Hypertension     Morbid obesity ([address] )     Last Assessed:12/21/2016 ;     Plantar fascial fibromatosis     Onset:1/23/2012    PONV (postoperative nausea and vomiting)     Sleep apnea     does not wear CPAP

## 2022-02-01 NOTE — PROGRESS NOTES
Daily Note     Today's date: 2022  Patient name: Puja Ansari  : 1945  MRN: [id number]  Referring provider: Fernando Arguelles DO  Dx:   Encounter Diagnoses   Name Primary?  COVID-19 Yes    Other fatigue                  Precautions: s/p COVID- poor activity tolerance  Visit 7  PN due 22  POC valid 22    Subjective: pt was 6 minutes late for session    Objective: See treatment below  -performed divided attention task of alphabet between boys/girls names in multimodal environment  Required min VCs for sustained attention secondary to externally distracted, required cues for problem solving required min VCs for recal of alphabet sequence  Performed EF skills task of organizing closet of Jans required moderate VCs for problem solving  Provided HEP for divided attention task       Assessment: Tolerated treatment well  Pt demonstrating difficulty with divided attention and required cues  Pt demonstrates difficulty with alternating attn  Plan: Continued skilled OT per POC

## 2022-02-03 ENCOUNTER — OFFICE VISIT (OUTPATIENT)
Dept: PHYSICAL THERAPY | Facility: CLINIC | Age: 77
End: 2022-02-03
Payer: COMMERCIAL

## 2022-02-03 ENCOUNTER — APPOINTMENT (OUTPATIENT)
Dept: OCCUPATIONAL THERAPY | Facility: CLINIC | Age: 77
End: 2022-02-03
Payer: COMMERCIAL

## 2022-02-03 ENCOUNTER — EVALUATION (OUTPATIENT)
Dept: OCCUPATIONAL THERAPY | Facility: CLINIC | Age: 77
End: 2022-02-03
Payer: COMMERCIAL

## 2022-02-03 ENCOUNTER — APPOINTMENT (OUTPATIENT)
Dept: PHYSICAL THERAPY | Facility: CLINIC | Age: 77
End: 2022-02-03
Payer: COMMERCIAL

## 2022-02-03 DIAGNOSIS — U07.1 COVID-19: Primary | ICD-10-CM

## 2022-02-03 DIAGNOSIS — R53.83 OTHER FATIGUE: ICD-10-CM

## 2022-02-03 DIAGNOSIS — R53.83 FATIGUE, UNSPECIFIED TYPE: ICD-10-CM

## 2022-02-03 DIAGNOSIS — U09.9 POST COVID-19 CONDITION, UNSPECIFIED: Primary | ICD-10-CM

## 2022-02-03 PROCEDURE — 96125 COGNITIVE TEST BY HC PRO: CPT

## 2022-02-03 PROCEDURE — 97530 THERAPEUTIC ACTIVITIES: CPT

## 2022-02-03 PROCEDURE — 97112 NEUROMUSCULAR REEDUCATION: CPT

## 2022-02-03 NOTE — PROGRESS NOTES
Daily Note   IE 22  POC: 3/31/22        Insurance:  AMA/CMS Eval/ Re-eval POC expires Marlen Ortiz #/ Referral # Total units  Start date  Expiration date Extension  Visit limitation? PT only or  PT+OT? Co-Insurance   Cigna: [facility] 2022 3/31/22  Not required NA NA NA NA 60 PCY combined PT/OT/ST Both $10                                                                  Date 1/6 1/10 1/12 1/18 1/20 1/25 1/26 2/1 2/3      Visits: 60 visits PCY combined > approx 30 for PT! Used 1 1 1 1 1       Authed: PT Remaining  58 56 54 53 51 49 47 45 43           Date 1/4 1/6 1/10 1/12 1/20 1/25 2/1 2/3       Units:  Used 1 1 1        Authed: OT Remaining  59 57 55 52 50 48 46 44                  Today's date: 2/3/2022  Patient name: Rupesh Armando  : 1945  MRN: [id number]  Referring provider: Sona Davis DO  Dx:   Encounter Diagnosis     ICD-10-CM    1  Post covid-19 condition, unspecified  U09 9    2  Fatigue, unspecified type  R53 83                   Subjective: Patient reports that he feels " a little sluggish today"  Objective: See treatment diary below    Pre vitals: 134/76, 82 bpm, 96%  Polar Beats:  BPM    NMR (Donned B/L 3lb ankle weights)  - STS with 10# MB, 2 min (108 bpm, 94%), dyspnea 3-4  - Step ups on BOSU FWD : 2 mins,  BPM, SPO2 96%, dyspnea 3  - Step ups on BOSU LAT:  3 mins,  BPM, SPO2 96%, dyspnea 2  - Side stepping w/ OH Med ball slam 10# : 2 mins, , SPO2 96%, dyspnea 3     TM Training:   - 11 min @ 2-2 5 mph, 3% grade  HR  SPO2 94-95%  (1 min cool down @ 1 5 mph) , dyspnea 3-4         Assessment: Patient tolerated session well today with continued focus on endurance  Patient tolerating progression of ankle weights for the entire session including on TM today  Patient reporting dypsnea up to 4, HR ranging from  BPM and self reported RPE of 15/20  Would like to trial reactive balance tasks next session   Patient will benefit from continued skilled therapy to improve endurance and dynamic balance to return to PLOF  Plan: Continue per plan of care          Outcome Measures Initial Eval  1/6/22        5xSTS 13 6 sec        DGI  FGA 18/24 18/30        10 meter 1 13 m/s        6MWT 1075 ft        SLUMS NT/30                                                          Precautions: HTN  Past Medical History:   Diagnosis Date    Arthritis     BPH (benign prostatic hypertrophy)     Hx post laser TURP    GERD (gastroesophageal reflux disease)     H/O exercise stress test     Hyperlipidemia     Hypertension     Morbid obesity ([address] )     Last Assessed:12/21/2016 ;     Plantar fascial fibromatosis     Onset:1/23/2012    PONV (postoperative nausea and vomiting)     Sleep apnea     does not wear CPAP

## 2022-02-03 NOTE — PROGRESS NOTES
OCCUPATIONAL THERAPY RE-EVALUATION:        2/3/22  Yuniel Jimenez  1945  [id number]  Renée Be     Diagnosis ICD-10-CM Associated Orders   1  COVID-19  U07 1 Ambulatory referral to Occupational Therapy   2  Other fatigue  R53 83 Ambulatory referral to Occupational Therapy      Precautions: reports hx B RC tears, poor activity tolerance     Assessment/Plan     SKILLED ANALYSIS:  Pt is seen for OT re-evaluation after 1 month of skilled OT services with focus on functional cognition and UE strengthening  Pt reports improved activity tolerance, memory, and increased independence with IADLs  He returned to work and has been managing without issues per pt  Pt reports continued difficulties with sustained and divided attn, immediate and delayed recall  Pt demonstrating significantly improved cognition on RBANs, improving from the 18th percentile to the 47th percentile  Pt demonstrating significant improvements in L grasp strength and R elbow ext/flexion strength  Pt achieved 4 STGs and is progressing towards his LTGs  Pt's cognitive deficits continue to limit his success in IADLs, and pt would benefit from continued skilled occupational therapy services for an additional 4-8 weeks to maximize functioning and independence with daily routine  Discussed recommendations with pt, and he is in agreement  PLAN OF CARE START:1/4/22  PLAN OF CARE END: 4/2/22  FREQUENCY: 2x/wk     SUBJECTIVE:  Pt's Goal: to improve his memory and attn     Occupational Profile: Pt works FT as a [employer] however has been out of work since having COVID in early December 2021  He reports that he used to enjoy going to the gym, however has not been going since the pandemic started  He states he sometimes enjoys watching hockey, but doesn't have many hobbies  He enjoys traveling   He lives alone and is engaged       PAIN:  At rest  0/10     OBJECTIVE:     CHARLIE   Gross grasp strength:  R: 86lbs  L: 77lbs    MMT:  RUE  Shoulder flexion 4/5  Shoulder abduction 4/5  Elbow flexion 5/5  Elbow extension 5/5     LUE  Shoulder flexion 4-/5  Elbow flexion 4/5  Elbow extension 4/5           Assessments  The Repeatable Battery for the Assessment of Neuropsychological Status (RBANS) is a brief, individually-administered assessment which measures attention, language, visuospatial/constructional abilities, and immediate & delayed memory  The RBANS is intended for use with adolescents to adults, ages 15 to 80 years  The following results were obtained during the administration of the assessment      Form: B     Cognitive Domain/Subtest: Index Score: Percentile Rank: Classification: IE: Status:   IMMEDIATE MEMORY 97  Average 78  IMPROVED        1  List Learning (23/40)            2  Story Memory (18/24)               VISUOSPATIAL/  CONSTRUCTIONAL 131 >75%ile Very Superior 121 IMPROVED        3  Figure Copy (20/20)            4  Line Orientation (20/20)               LANGUAGE 90 51-75%ile Average 75 IMPROVED         5  Picture Naming (10/10)            6  Semantic Fluency (14/40)               ATTENTION 72  Borderline 82 MILD DECLINE         7  Digit Span (6/16)            8  Coding (31/89)               DELAYED MEMORY 107 List recall: 26-50%ile    List Recognition: 51-75%ile Average 95  IMPROVED        9  List Recall (4/10)            10  List Recognition (20/20)            11  Story Recall (6/12)            12  Figure Recall (18/20)                Sum of Index Scores:  497   451     Total Scale:  99    86     Percentile: 47th%ile    18th %ile     Classification: Average    Low average           IE indicates the scores from the initial evaluation (1/4/22)   Form: C     SHORT TERM GOALS 4-6 weeks:      Memory   -Pt will demo G carryover of use of internal/external memory strategy aides for improved recall of daily events, with 75% accuracy PROGRESSING  -Pt will increase immediate recall being able to recall 34/64 items on RBANS in STM  ACHIEVED  -Pt will increase delayed recall being able to recall 41/62 items on RBANs in LTM ACHIEVED     Upper Extremity   -Pt will improve L  strength by 3 lbs in order to complete IADLs  ACHIEVED  -Pt will improve R elbow strength to 4+/5 in order to complete IADLs  ACHIEVED     Attention     ? Pt will maintain attention to task for 20 minutes in semi modal environment for baseline performance,  improved role performance and to improve learning and to simulate return to IADLs  PROGRESSING  ?  Pt will demo ability to participate in dual tasking/divided attention task with 60% accuracy in multimodal environment to simulate return to life roles [address] [place] [address] 8-12 weeks  Memory   -Pt will demo G carryover of use of internal/external memory strategy aides for improved recall of daily events, with 90% accuracy   -Pt will increase immediate recall being able to recall 37/64 items on RBANS in STM  -Pt will increase delayed recall being able to recall 43/62 items on RBANs in LTM      Attention     ? Pt will maintain attention to task for 20 minutes in multi modal environment for baseline performance,  improved role performance and to improve learning and to simulate return to IADLs     ?  Pt will demo ability to participate in dual tasking/divided attention task with 90% accuracy in multimodal environment to simulate return to life roles     Upper Extremity   -Pt will improve L  strength by 6 lbs in order to complete IADLs   -Pt will improve R elbow strength to 5/5 in order to complete IADLs          TIME SPENT  90 min for administration, documentation, interpretation, scoring adn POC development RBANS     PLANNED THERAPY INTERVENTIONS:  Internal and external memory aides  Hypersensitivity strategies education  Multi-modal environment  Sustained/alternating/divided attention  Temporal Awareness: Organize the Hour activities  Memory and mental manipulation  Auditory processing with immediate recall  Memory retention with immediate and delayed recall  Edu on cog/vision apps           Date 1/6 1/10 1/12 1/18 1/20 1/25 1/26 2/1 2/3         Visits: 60 visits PCY combined > approx 30 for PT!  Used 1 1 1 1 1 1 1 1 1         Authed: PT Remaining  58 56 54 53 51 49 47 45 43                 Date 1/4 1/6 1/10 1/12 1/20 1/25 2/1 2/3           Units:  Used 1 1 1 1 1 1 1 1           Authed: OT Remaining  59 57 55 52 50 48 46 44

## 2022-02-07 ENCOUNTER — OFFICE VISIT (OUTPATIENT)
Dept: OCCUPATIONAL THERAPY | Facility: CLINIC | Age: 77
End: 2022-02-07
Payer: COMMERCIAL

## 2022-02-07 ENCOUNTER — OFFICE VISIT (OUTPATIENT)
Dept: PHYSICAL THERAPY | Facility: CLINIC | Age: 77
End: 2022-02-07
Payer: COMMERCIAL

## 2022-02-07 DIAGNOSIS — U07.1 COVID-19: Primary | ICD-10-CM

## 2022-02-07 DIAGNOSIS — U09.9 POST COVID-19 CONDITION, UNSPECIFIED: Primary | ICD-10-CM

## 2022-02-07 DIAGNOSIS — R53.83 FATIGUE, UNSPECIFIED TYPE: ICD-10-CM

## 2022-02-07 DIAGNOSIS — R53.83 OTHER FATIGUE: ICD-10-CM

## 2022-02-07 DIAGNOSIS — U09.9 POST-COVID-19 CONDITION: ICD-10-CM

## 2022-02-07 DIAGNOSIS — R06.2 WHEEZING: ICD-10-CM

## 2022-02-07 PROCEDURE — 97112 NEUROMUSCULAR REEDUCATION: CPT

## 2022-02-07 PROCEDURE — 97112 NEUROMUSCULAR REEDUCATION: CPT | Performed by: PHYSICAL THERAPIST

## 2022-02-07 PROCEDURE — 97530 THERAPEUTIC ACTIVITIES: CPT

## 2022-02-07 RX ORDER — FLUTICASONE PROPIONATE 220 UG/1
AEROSOL, METERED RESPIRATORY (INHALATION)
Qty: 12 G | Refills: 1 | Status: SHIPPED | OUTPATIENT
Start: 2022-02-07

## 2022-02-07 NOTE — PROGRESS NOTES
Daily Note   IE 22  POC: 3/31/22        Insurance:  AMA/CMS Eval/ Re-eval POC expires Hernan Boast #/ Referral # Total units  Start date  Expiration date Extension  Visit limitation? PT only or  PT+OT? Co-Insurance   [employer]ign[employer]: [facility] 2022 3/31/22  Not required NA NA NA NA 60 PCY combined PT/OT/ST Both $10                                                                  Date 1/6 1/10 1/12 1/18 1/20 1/25 1/26 2/1 2/3      Visits: 60 visits PCY combined > approx 30 for PT! Used 1 1 1 1 1 1       Authed: PT Remaining  58 56 54 53 51 49 47 45 43           Date 1/4 1/6 1/10 1/12 1/20 1/25 2/1 2/3 2/7      Units:  Used 1 1 1 1 1 1  1      Authed: OT Remaining  59 57 55 52 50 48 46 44 43                 Today's date: 2022  Patient name: Corry Masters  : 1945  MRN: [id number]  Referring provider: Flakita Cooper DO  Dx:   Encounter Diagnosis     ICD-10-CM    1  Post covid-19 condition, unspecified  U09 9    2  Fatigue, unspecified type  R53 83                   Subjective: Patient reports that he feels " a little sluggish today"  Objective: See treatment diary below    Pre vitals: 88 bpm, 95%  Polar Beats:  BPM    NMR (Donned B/L 3lb ankle weights)  - STS with 10# MB, 2 min, 108 bpm, dyspnea 3-4  - Step ups on BOSU FWD : 2 mins,  BPM, dyspnea 3  - Step ups on BOSU LAT:  3 mins,  BPM, dyspnea 4  - Side stepping w/ squats : 2 mins, , dyspnea 3   - Mod Tandem ambulation: 4 min,  bpm         Assessment: Patient tolerated treatment well  He was unable to perform squats with proper form, despite verbal, visual, and tactile cueing  Patient tolerates 2 minute intervals well, when increasing to 3 minute increases in dyspnea up to 4/10  He requires CGA, gait belt, and UE support to maintain balance and increase confidence when attempting tandem ambulation  Patient required changing to mod tandem ambulation to increase CELESTE   Patient will benefit from continued skilled therapy to improve endurance and dynamic balance to return to PLOF  Plan: Continue per plan of care          Outcome Measures Initial Eval  1/6/22        5xSTS 13 6 sec        DGI  FGA 18/24 18/30        10 meter 1 13 m/s        6MWT 1075 ft        SLUMS NT/30                                                          Precautions: HTN  Past Medical History:   Diagnosis Date    Arthritis     BPH (benign prostatic hypertrophy)     Hx post laser TURP    GERD (gastroesophageal reflux disease)     H/O exercise stress test     Hyperlipidemia     Hypertension     Morbid obesity ([address] )     Last Assessed:12/21/2016 ;     Plantar fascial fibromatosis     Onset:1/23/2012    PONV (postoperative nausea and vomiting)     Sleep apnea     does not wear CPAP

## 2022-02-07 NOTE — PROGRESS NOTES
Daily Note     Today's date: 2022  Patient name: Yadiel Weldon  : 1945  MRN: [id number]  Referring provider: Gaby Alonzo, DO  Dx:   Encounter Diagnoses   Name Primary?  COVID-19 Yes    Other fatigue                   PLAN OF CARE START:22  PLAN OF CARE END: 22  FREQUENCY: 2x/wk       Subjective: "I'm losing track of things already"      Objective: See treatment below   -Short narrative recall with use of info prioritization and external memory strategies to inc recall  5/6 accuracy follow up questions  -Multimatrix activity with transfer of white figure cubes in sequential order to reveal number underneath  Pt then writes a word that starts with correlating letter (ie: 1=A)  Externally distracted in multimodal environment but only requires 1 cue for redirection  Initially requires max cues to recall steps in sequence, but improves to [place] with inc time with repetition   -Part/whole related items recall activity with focus on use of internal memory strategies to inc recall  Able to recall ~2/3 of related items, noted to have significantly more difficulty with items towards end of list, appears to be related to attn in multi modal environment  -Complex short narrative recall activity with focus on recall with use of internal and external memory strategies  Able to recall 6/6 items accurately      Assessment: Tolerated treatment fair  Pt demonstrating decreased memory in multimodal environment, but good use of external and internal memory strategies with cues to implement    Pt would benefit from continued OT services to address functional cognition      Plan: Continued skilled OT per POC

## 2022-02-08 ENCOUNTER — APPOINTMENT (OUTPATIENT)
Dept: OCCUPATIONAL THERAPY | Facility: CLINIC | Age: 77
End: 2022-02-08
Payer: COMMERCIAL

## 2022-02-08 ENCOUNTER — APPOINTMENT (OUTPATIENT)
Dept: PHYSICAL THERAPY | Facility: CLINIC | Age: 77
End: 2022-02-08
Payer: COMMERCIAL

## 2022-02-09 ENCOUNTER — OFFICE VISIT (OUTPATIENT)
Dept: OCCUPATIONAL THERAPY | Facility: CLINIC | Age: 77
End: 2022-02-09
Payer: COMMERCIAL

## 2022-02-09 ENCOUNTER — OFFICE VISIT (OUTPATIENT)
Dept: PHYSICAL THERAPY | Facility: CLINIC | Age: 77
End: 2022-02-09
Payer: COMMERCIAL

## 2022-02-09 DIAGNOSIS — U09.9 POST COVID-19 CONDITION, UNSPECIFIED: Primary | ICD-10-CM

## 2022-02-09 DIAGNOSIS — R53.83 OTHER FATIGUE: ICD-10-CM

## 2022-02-09 DIAGNOSIS — U07.1 COVID-19: Primary | ICD-10-CM

## 2022-02-09 PROCEDURE — 97112 NEUROMUSCULAR REEDUCATION: CPT

## 2022-02-09 PROCEDURE — 97530 THERAPEUTIC ACTIVITIES: CPT

## 2022-02-09 NOTE — PROGRESS NOTES
Daily Note   IE 22  POC: 3/31/22        Insurance:  AMA/CMS Eval/ Re-eval POC expires Hilary Daughters #/ Referral # Total units  Start date  Expiration date Extension  Visit limitation? PT only or  PT+OT? Co-Insurance   [employer]ign[employer]: [facility] 2022 3/31/22  Not required NA NA NA NA 60 PCY combined PT/OT/ST Both $10                                                                  Date 1/6 1/10 1/12 1/18 1/20 1/25 1/26 2/1 2/3      Visits: 60 visits PCY combined > approx 30 for PT! Used 1 1  1 1 1       Authed: PT Remaining  58 56 54 53 51 49 47 45 43           Date 1/4 1/6 1/10 1/12 1/20 1/25 2/1 2/3 2/7      Units:  Used 1 1 1 1 1       Authed: OT Remaining  59 57 55 52 50 48 46 44 43                 Today's date: 2022  Patient name: Puma Hurtado  : 1945  MRN: [id number]  Referring provider: Rian Venegas DO  Dx:   No diagnosis found  Subjective: Patient reports that he feels " OK" today  Objective: See treatment diary below    Pre vitals: 88 bpm, 95% O2  Polar Beats:  BPM    Treadmill- 10 min between 2 0-2 6 mph  Polar Beats: 90- 116 bpm  NMR (Donned B/L 3lb ankle weights)  - STS with 10# MB, 2 min, 104 bpm, dyspnea 3-4  -STS with 10# MB with ball slam, 2min , 118 bpm, dyspnea 4  - Step ups on BOSU FWD : 2 mins,  BPM, dyspnea 3-4  - Step ups over BOSU LAT:  3 mins,  BPM, dyspnea 4  - Side stepping w/ squats : 2 mins, , dyspnea 3-4   - Mod Tandem ambulation: 4 min,  bpm 3 dyspnea         Assessment: Patient tolerated treatment well  Patient required CGA in order to perform lateral step overs on BOSU with verbal cueing to lift eye gaze  He was able to perform squats with proper form  Patient tolerates 2 minute intervals well, when increasing to 3 minute increases in dyspnea up to 4/10  He requires CGA, gait belt, and UE support to maintain balance and increase confidence when attempting tandem ambulation   Patient required changing to mod tandem ambulation to increase CELESTE  Patient will benefit from continued skilled therapy to improve endurance and dynamic balance to return to PLOF  Plan: Continue per plan of care          Outcome Measures Initial Eval  1/6/22        5xSTS 13 6 sec        DGI  FGA 18/24 18/30        10 meter 1 13 m/s        6MWT 1075 ft        SLUMS NT/30                                                          Precautions: HTN  Past Medical History:   Diagnosis Date    Arthritis     BPH (benign prostatic hypertrophy)     Hx post laser TURP    GERD (gastroesophageal reflux disease)     H/O exercise stress test     Hyperlipidemia     Hypertension     Morbid obesity ([address] )     Last Assessed:12/21/2016 ;     Plantar fascial fibromatosis     Onset:1/23/2012    PONV (postoperative nausea and vomiting)     Sleep apnea     does not wear CPAP

## 2022-02-09 NOTE — PROGRESS NOTES
Daily Note     Today's date: 2022  Patient name: Orlando Montague  : 1945  MRN: [id number]  Referring provider: Ilsa Gomez DO  Dx:   Encounter Diagnoses   Name Primary?  COVID-19 Yes    Other fatigue                   PLAN OF CARE START:22  PLAN OF CARE END: 22  FREQUENCY: 2x/wk       Subjective: "that's my attention"      Objective: See treatment below  -performed Q bitz task focusing on EF skills, sustained attention and  skills  Pt was able to complete moderately complex design with increased time with independence  Performed divided atetntion with states x 3 times required 5-6 VCs throughout session for attending to task and pt was externally disctracted required cues for redirection      Assessment: Tolerated treatment fair  Pt demonstrating decreased sustained and divided attention   Mikki Lozano Pt would benefit from continued OT services to address functional cognition      Plan: Continued skilled OT per POC

## 2022-02-10 ENCOUNTER — APPOINTMENT (OUTPATIENT)
Dept: PHYSICAL THERAPY | Facility: CLINIC | Age: 77
End: 2022-02-10
Payer: COMMERCIAL

## 2022-02-10 ENCOUNTER — APPOINTMENT (OUTPATIENT)
Dept: OCCUPATIONAL THERAPY | Facility: CLINIC | Age: 77
End: 2022-02-10
Payer: COMMERCIAL

## 2022-02-15 ENCOUNTER — APPOINTMENT (OUTPATIENT)
Dept: PHYSICAL THERAPY | Facility: CLINIC | Age: 77
End: 2022-02-15
Payer: COMMERCIAL

## 2022-02-15 ENCOUNTER — OFFICE VISIT (OUTPATIENT)
Dept: OCCUPATIONAL THERAPY | Facility: CLINIC | Age: 77
End: 2022-02-15
Payer: COMMERCIAL

## 2022-02-15 ENCOUNTER — EVALUATION (OUTPATIENT)
Dept: PHYSICAL THERAPY | Facility: CLINIC | Age: 77
End: 2022-02-15
Payer: COMMERCIAL

## 2022-02-15 ENCOUNTER — APPOINTMENT (OUTPATIENT)
Dept: OCCUPATIONAL THERAPY | Facility: CLINIC | Age: 77
End: 2022-02-15
Payer: COMMERCIAL

## 2022-02-15 DIAGNOSIS — U09.9 POST COVID-19 CONDITION, UNSPECIFIED: Primary | ICD-10-CM

## 2022-02-15 DIAGNOSIS — R53.83 FATIGUE, UNSPECIFIED TYPE: ICD-10-CM

## 2022-02-15 DIAGNOSIS — R53.83 OTHER FATIGUE: ICD-10-CM

## 2022-02-15 DIAGNOSIS — U07.1 COVID-19: Primary | ICD-10-CM

## 2022-02-15 PROCEDURE — 97530 THERAPEUTIC ACTIVITIES: CPT

## 2022-02-15 PROCEDURE — 97112 NEUROMUSCULAR REEDUCATION: CPT

## 2022-02-15 NOTE — PROGRESS NOTES
PT-Re-Eval          Insurance:  AMA/CMS Eval/ Re-eval POC expires Hernan Boast #/ Referral # Total units  Start date  Expiration date Extension  Visit limitation? PT only or  PT+OT? Co-Insurance   Cigna: [facility] 2022 3/31/22  Not required NA NA NA NA 60 PCY combined PT/OT/ST Both $10    2/15/2022 3/31/22   NA NA NA NA 60 PCY combined PT/OT/ST Both $10                                                  Date 1/6 1/10 1/12 1/18 1/20 1/25 1/26 2/1 2/3 2/7 2/9 2/15   Visits: 60 visits PCY combined > approx 30 for PT! Used 1 1 1 1 1 1 1 1 1 1 1 1   Authed: PT Remaining  58 56 54 53 51 49 47 45 43 41 39 38        Date 1/4 1/6 1/10 1/12 1/20 1/25 2/1 2/3 2/7 2/9 2/15    Units:  Used 1 1 1 1 1 1 1 1 1 1 1    Authed: OT Remaining  59 57 55 52 50 48 46 44 42 40 37                   Today's date: 2/15/2022  Patient name: Corry Masters  : 1945  MRN: [id number]  Referring provider: Flakita Cooper DO  Dx:   Encounter Diagnosis     ICD-10-CM    1  Post covid-19 condition, unspecified  U09 9    2  Fatigue, unspecified type  R53 83          Assessment  Assessment details: Patient is a 68 y o  Male who presents to skilled outpatient PT with fatigue and lethargy following infection due to COVID-19 in 2021  Since acute illness and related hospitalization patient has experienced significant reduction in functional status due to fatigue and shortness of breath  At the time of initial eval patient was unable to work and fully participate in general functional mobility without substantial fatigue  However, approximately one month into therapy patient has re-started occupational duties  Patient demonstrated slight improvements across the board with the following outcome measures includin MWT, 10 MWT, 5x STS and FGA/DGI measurements   Patient presents as a HIGH risk of falls with impaired dynamic balance per FGA and DGI scores of 21/30 and 21/24 respectively  Patient reporting baseline dizziness/unsteadiness and has previously been assessed for vertigo, however patient was not symptomatic during a quick vestibular screening  Would like to assess positional and oculomotor testing in upcoming session  However, patient reporting increased hesitancy to perform prior to work this date  Patient in agreement to switch one of his appointments to a later time to allow for this  Pt will undoubtedly benefit from skilled physical therapy to improve endurance, balance, and functional strength for return to PLOF, work, and health maintenance/exercise activities  Please contact me if you have any questions or recommendations  Thank you for the referral and the opportunity to share in [facility] care  Patient verbalized understanding of POC        Impairments: Activity intolerance, Impaired balance, Lacks appropriate HEP and Safety issue  Understanding of Dx/Px/POC: Good  Prognosis: Good      Cut off score   All date taken from APTA Neuro Section or Rehab Measures      Ramsey64  MDC: 6 pts  Age Norms:  61-76: M - 54   F - 55  70-79: M - 47   F - 53  80-89: M - 48   F - 50 5xSTS: Fransisco et al 2010  MDC: 2 3 sec  Age Norms:  60-69: 11 1 sec  70-79: 12 6 sec  80-89: 14 8 sec   TUG  MDC: 4 14 sec  Cut off score:  >13 5 sec community dwelling adults  >32 2 frail elderly  <20 I for basic transfers  >30 dependent on transfers 10 Meter Walk Test: Brittany Titusr al 2011  20-29: M - 1 35 m   F - 1 34 m  30-39: M - 1 43 m   F - 1 34 m  40-49: M - 1 43 m   F - 1 39 m  50-59: M - 1 43 m   F - 1 31 m  60-69: M - 1 34 m   F - 1 24 m  70-79: M - 1 26 m   F - 1 13 m  80-89: M - 0 97 m   F - 0 94 m   FGA  MCID: 4 pts  Geriatrics/community < 22/30 fall risk  Geriatrics/community < 20/30 unexplained falls    DGI  MDC: vestibular - 4 pts  MDC: geriatric/community - 3 pts  Falls risk <19/24 mCTSIB  Norm: 20-60 yrs  Eyes open firm: norm sway 0 21-0 48  Eyes closed firm: norm sway 0 48-0 99  Eyes open foam: norm sway 0 38-0 71  Eyes closed foam: norm sway 0 70-2 22   6 Minute Walk Test  MDC: 190 98 ft  MCID: 164 ft    Age Norms  61-76: [address] ft (571 80 m)  F - 1765 ft (537 98 m)  70-79: M - 1729 ft (527 00 m)  F - 1545 ft (470 92 m)  80-89: M - 1368 ft (416 97 m)  F - 1286 ft (391 97 m) ABC: Kenney Webber, 2003  <67% increased risk for falls           Goals  Short Term Goals (4-6 weeks):  - Patient will be independent with simple HEP  - Patient will improve with DGI by 3 points per [address] to promote improved safety with dynamic tasks- MET  - Patient will improve FGA score by 4 points per MDC to promote improved safety with dynamic tasks- NOT MET  - Patient will improve 6 Minute Walk Test score by 190 feet from 1075 feet to 1265 feet to promote improved cardiovascular endurance- MET      Long Term Goals (12 weeks):  - Patient will be independent with complex HEP  - Patient will be able to perform 15 minutes of aerobic activity at HR 70% max to facilitate return to sport/normal functional tasks  - Patient will complete work related tasks without exacerbation of symptoms in order to maximize function and promote return to work  - Patient will complete at least 1729 feet in 6 Minute Walk Test to meet age- and gender-matched norm for cardiovascular endurance  - Patient will report going on walks at least 3 days per week to promote independence and improved cardiovascular endurance  - Patient will report ability to return to work without restriction to demonstrate return to [address] \[facility] [place] [facility]\"" details: skilled physical therapy + skilled occupational therapy   Patient would benefit from: Skilled PT and Skilled OT  Planned modality interventions: Biofeedback  Planned therapy interventions: Balance, Breathing training, Body mechanics training, Gait training, HEP, Manual therapy, Neuromuscular re-education, Patient education, Strengthening, Stretching, Therapeutic activities, Therapeutic exercises and Work reintegration  Frequency: 2x/wk  Duration in weeks: 12  Plan of Care beginning date: 1/6/22  Plan of Care expiration date: 12 weeks - 3/31/22  Treatment plan discussed with: patient        Subjective Evaluation    History of Present Illness  Mechanism of injury: Pt presents to therapy following COVID in early December and subsequent UTI which he is following with urologist  Patient was hospitalized 12/13 to 12/17 and has felt significant fatigue since then  Patient frequently feels like "heart is racing fast" with activity and he feels lethargic  Patient also notes "wheezing" in chest and takes medication for cough  Patient is not working due to this condition due to being physically unable to climb several stories in fatigued state; hoping to return to work by end of January  Patient notes that when he had covid he felt withdrawn from others but feels back to normal now  Pt goal for therapy is to "snap out of this condition and get back to work "    Dizziness Subjective  None reported  Pain  Nothing relevant (hx of arthritis)  Wearing B/L knee braces due to arthritis  Social Support  Steps to enter house: 2+1 without railing  Stairs in house: has 2nd floor but does not need to access it  Lives in: house  Lives with: alone    Employment status: [employer], not working currently due to [employer] dominance: R[employer]    Treatments  Previous treatment: Occupational therapy   Current treatment: PT + OT  Diagnostic Testing: COVID testing, urine culture       Objective     Yellow Flag Question:     - Were you in the ICU? No   - Were you on a ventilator? No   (if yes continue with  PTSD, PHQ -9 screenings)     - Do you feel that your voice has changed? No  - Are you having difficulty with swallowing? No   (if yes referral to speech therapy)    - Difficulty with participation in ADL and IADL?  Yes - attending OT   (if yes determine if referral to OT is appropriate)    - Breathing Difficulty?  - Has pulmonary function test been completed? No   (If yes continue with breathing evaluation)    Bowel/Bladder changes: (referral for pelvic therapy)   - Have you had any new onset of urinary or bowel leakage, EVEN JUST A LITTLE BIT, since onset of Covid? Yes - may be related to urinary tract issues  - Have you had any new difficulty with starting a urine stream or a bowel movement since onset of Covid?  No       Objective:      Vitals:   - BP: 138/80   - HR: 90 bpm  - SP02: 94%    LE MMT:  - R Hip Flexion: 5/5  L Hip Flexion: 5/5  - R Hip Extension: 5/5  L Hip Extension: 5/5  - R Hip Abduction: 5/5  L Hip Abduction: 5/5  - R Hip Adduction: 5/5  L Hip Adduction: 5/5  - R Knee Extension: 5/5 L Knee Extension: 5/5  - R Knee Flexion: 5/5  L Knee Flexion: 5/5  - R Ankle DF: 5/5  L Ankle DF: 5/5  - R Ankle PF: 5/5  L Ankle PF: 5/5      Functional Outcome Measures:  - 6 Minute Walk Test: 1075 ft  - Gait Speed : 1 13 m/s  - 5x Sit to stand: 13 6 sec  - FGA  score: 18/30       Breathing evaluation:     Describe breathing: via: room air    Breathing pattern: WNL    Head Thrust L/R: (-) B/L, 1 saccadic beat to correct for R head turn 1x  VOR Cx: (-) Vertical/horizontal (no reports of dizziness)  VOR x1: (-) no reports of increased dizziness and able to track well    Cleared C-Spine prior to testing above   - Normal cervical AROM (hypomobility)  - Alar ligament testing (-)  - Sharp Michael (-)  - Modified VBI (-)      Outcome Measures Initial Eval  1/6/22 PN  2/15/22       5xSTS 13 6 sec 13 27 sec       DGI  FGA 18/24 18/30 21/24 21/30       10 meter 1 13 m/s 1 14 m/s       6MWT 1075 ft 1340 ft                                                                  Precautions: HTN  Past Medical History:   Diagnosis Date    Arthritis     BPH (benign prostatic hypertrophy)     Hx post laser TURP    GERD (gastroesophageal reflux disease)     H/O exercise stress test     Hyperlipidemia     Hypertension     Morbid obesity ([facility] [address] )     Last Assessed:12/21/2016 ;     Plantar fascial fibromatosis     Onset:1/23/2012    PONV (postoperative nausea and vomiting)     Sleep apnea     does not wear CPAP

## 2022-02-15 NOTE — PROGRESS NOTES
Daily Note     Today's date: 2/15/2022  Patient name: Orlando Montague  : 1945  MRN: [id number]  Referring provider: Ilsa Gomez, DO  Dx:   Encounter Diagnoses   Name Primary?  COVID-19 Yes    Other fatigue        Start Time: 0800  Stop Time: 0845  Total time in clinic (min): 45 minutes    PLAN OF CARE START:22  PLAN OF CARE END: 22  FREQUENCY: 2x/wk       Subjective: "I'm doing terrible"      Objective: See treatment below   -change one letter worksheet with focus on problem solving, info organization, working memory and sustained attn in multi modal environment  Requires category cue for 100% of items, and cues for possible options ~50% of items  Poor carry over of problem solving strategies throughout session  Internally distracted during session, at times requiring re-direction  Assessment: Tolerated treatment fair  Pt demonstrating difficulty with sustained attn in multi modal environment, as well as minimal carry over of problem solving strategies  Pt would benefit from continued OT services to address functional cognition      Plan: Continued skilled OT per POC

## 2022-02-16 NOTE — PROGRESS NOTES
Daily Note     Today's date: 2022  Patient name: Earlene Nelson  : 1945  MRN: [id number]  Referring provider: Roney Galeazzi, DO  Dx:   Encounter Diagnosis   Name Primary?  COVID-19 Yes                  PLAN OF CARE START:22  PLAN OF CARE END: 22  FREQUENCY: 2x/wk       Subjective: "Sometimes my memory is good, sometimes it is bad "    Objective: See treatment below  Memory Game #2   Immediate Recall: 10/13  Delayed Recall:     Multimatrix with Figure Ground  Category Naming with Block Color     Attention and Problem Solving Cognitive Worksheet  Required mod vc   Improved performance with second task    Provided with [facility] worksheet for HEP    Assessment: Tolerated treatment fair  Pt demonstrating decreased sustained and divided attention  Exhibited decreased attention during Multimatrix task, required a few re-directions  Difficulty with attention to detail and problem solving during cognitive therapeutic activities  Able to carry over categorization memory strategy  Pt would benefit from continued OT services to address functional cognition      Plan: Continued skilled OT per POC

## 2022-02-17 ENCOUNTER — APPOINTMENT (OUTPATIENT)
Dept: OCCUPATIONAL THERAPY | Facility: CLINIC | Age: 77
End: 2022-02-17
Payer: COMMERCIAL

## 2022-02-17 ENCOUNTER — OFFICE VISIT (OUTPATIENT)
Dept: PHYSICAL THERAPY | Facility: CLINIC | Age: 77
End: 2022-02-17
Payer: COMMERCIAL

## 2022-02-17 ENCOUNTER — APPOINTMENT (OUTPATIENT)
Dept: PHYSICAL THERAPY | Facility: CLINIC | Age: 77
End: 2022-02-17
Payer: COMMERCIAL

## 2022-02-17 ENCOUNTER — OFFICE VISIT (OUTPATIENT)
Dept: OCCUPATIONAL THERAPY | Facility: CLINIC | Age: 77
End: 2022-02-17
Payer: COMMERCIAL

## 2022-02-17 DIAGNOSIS — R53.83 FATIGUE, UNSPECIFIED TYPE: ICD-10-CM

## 2022-02-17 DIAGNOSIS — U09.9 POST COVID-19 CONDITION, UNSPECIFIED: Primary | ICD-10-CM

## 2022-02-17 DIAGNOSIS — U07.1 COVID-19: Primary | ICD-10-CM

## 2022-02-17 PROCEDURE — 97112 NEUROMUSCULAR REEDUCATION: CPT

## 2022-02-17 PROCEDURE — 97530 THERAPEUTIC ACTIVITIES: CPT

## 2022-02-17 PROCEDURE — 97110 THERAPEUTIC EXERCISES: CPT

## 2022-02-17 NOTE — PROGRESS NOTES
Daily Note   PN 2/15/22  POC: 3/31/22          Insurance:  AMA/CMS Eval/ Re-eval POC expires Cate Leal #/ Referral # Total units  Start date  Expiration date Extension  Visit limitation? PT only or  PT+OT? Co-Insurance   Cigna: [facility] 2022 3/31/22  Not required NA NA NA NA 60 PCY combined PT/OT/ST Both $10    2/15/2022 3/31/22   NA NA NA NA 60 PCY combined PT/OT/ST Both $10                                                  Date 1/6 1/10 1/12 1/18 1/20 1/25 1/26 2/1 2/3 2/7 2/9 2/15 2/17   Visits: 60 visits PCY combined > approx 30 for PT! Used 1 1 1 1 1 1 1 1 1 1 1 1 1   Authed: PT Remaining  58 56 54 53 51 49 47 45 43 41 39 38 35        Date 1/4 1/6 1/10 1/12 1/20 1/25 2/1 2/3 2/7 2/9 2/15 2/17   Units:  Used 1 1 1 1 1 1 1 1 1 1 1 1   Authed: OT Remaining  59 57 55 52 50 48 46 44 42 40 37 36                Today's date: 2022  Patient name: Yadiel Weldon  : 1945  MRN: [id number]  Referring provider: Gaby Alonzo DO  Dx:   Encounter Diagnosis     ICD-10-CM    1  Post covid-19 condition, unspecified  U09 9    2  Fatigue, unspecified type  R53 83                   Subjective: Patient reports that he feels tired coming in      Objective: See treatment diary below    TA  Pre vitals: 88 bpm, 94% O2  Polar Beats:  BPM    TE  Treadmill- 6 mins, 2 0-2 6 mph, grade 0-4%, HR 80-92 BPM    NMR   - STS @  step no weight + 20# MB slams, 3 mins,  bpm, dyspnea 3-4  - AMB w/ 26# kettle bell,  BPM, dyspnea 4,   - Sled push (Added 40#) 300 ft x2 laps (+Ramp),  BPM, dyspnea 3-5,   - Speed step on/off 8'' step x2 mins ,   BPM, dyspnea 3-4  - Med ball slams 20# x1 min,  BPM, dyspnea 3-4         Assessment: Patient tolerated treatment fairly  Patient at times becomes distracted and requires verbal instructions to remain on task  Focus of today's session was on dynamic balance and circuit training exercises   Patient illustrated substantial challenge when performing STS from a lowered surface and at times required UE support from stairs  Patient continuing respond well to endurance based activities  Patient will benefit from continued skilled therapy to improve endurance and dynamic balance to return to PLOF  Plan: Continue per plan of care             Outcome Measures Initial Eval  1/6/22 PN  2/15/22       5xSTS 13 6 sec 13 27 sec       DGI  FGA 18/24 18/30 21/24 21/30       10 meter 1 13 m/s 1 14 m/s       6MWT 1075 ft 1340 ft                                                                  Precautions: HTN  Past Medical History:   Diagnosis Date    Arthritis     BPH (benign prostatic hypertrophy)     Hx post laser TURP    GERD (gastroesophageal reflux disease)     H/O exercise stress test     Hyperlipidemia     Hypertension     Morbid obesity ([address] )     Last Assessed:12/21/2016 ;     Plantar fascial fibromatosis     Onset:1/23/2012    PONV (postoperative nausea and vomiting)     Sleep apnea     does not wear CPAP

## 2022-02-21 ENCOUNTER — OFFICE VISIT (OUTPATIENT)
Dept: OCCUPATIONAL THERAPY | Facility: CLINIC | Age: 77
End: 2022-02-21
Payer: COMMERCIAL

## 2022-02-21 ENCOUNTER — OFFICE VISIT (OUTPATIENT)
Dept: PHYSICAL THERAPY | Facility: CLINIC | Age: 77
End: 2022-02-21
Payer: COMMERCIAL

## 2022-02-21 DIAGNOSIS — U07.1 COVID-19: Primary | ICD-10-CM

## 2022-02-21 DIAGNOSIS — R53.83 OTHER FATIGUE: ICD-10-CM

## 2022-02-21 DIAGNOSIS — U09.9 POST COVID-19 CONDITION, UNSPECIFIED: Primary | ICD-10-CM

## 2022-02-21 DIAGNOSIS — R53.83 FATIGUE, UNSPECIFIED TYPE: ICD-10-CM

## 2022-02-21 PROCEDURE — 97530 THERAPEUTIC ACTIVITIES: CPT

## 2022-02-21 PROCEDURE — 97112 NEUROMUSCULAR REEDUCATION: CPT

## 2022-02-21 NOTE — PROGRESS NOTES
Daily Note     Today's date: 2022  Patient name: Suzi Thrasher  : 1945  MRN: [id number]  Referring provider: Angela Mccullough DO  Dx:   Encounter Diagnoses   Name Primary?  COVID-19 Yes    Other fatigue        Start Time: 805  Stop Time: 845  Total time in clinic (min): 40 minutes    PLAN OF CARE START:22  PLAN OF CARE END: 22  FREQUENCY: 2x/wk         Subjective: "its going okay"- pt was 5 minutes late for therapy session  Objective: See treatment below  Performed card sorting of 3 itmes for cognitive warm up for sustained attention required 2 VCs for organizing  Performed card sorting task with pt required to recall 3 items required 2-3 VCs for recall of items and min VCs for organizing focusing on divided attention, EF skills, and memory  Performed EF skills task and sustained attention task of organizing garden plot required mod VCs for problem solving required increased time for processing to complete  Provided with Babita's schedule to complete at home  Assessment: Tolerated treatment well  Pt demonstrating decreased sustained and divided attention as well as EF skills, with repetition and massed practice  Pt demonstrating decreased sustained attention and rquired min VCs for redirection  Pt would benefit from continued OT services to address functional cognition      Plan: Continued skilled OT per POC

## 2022-02-21 NOTE — PROGRESS NOTES
Daily Note   PN 2/15/22  POC: 3/31/22          Insurance:  AMA/CMS Eval/ Re-eval POC expires Hollie Bustamante #/ Referral # Total units  Start date  Expiration date Extension  Visit limitation? PT only or  PT+OT? Co-Insurance   Cigna: [facility] 2022 3/31/22  Not required NA NA NA NA 60 PCY combined PT/OT/ST Both $10    2/15/2022 3/31/22   NA NA NA NA 60 PCY combined PT/OT/ST Both $10                                                  Date 1/6 1/10 1/12 1/18 1/20 1/25 1/26 2/1 2/3 2/7 2/9 2/15 2/17 2/21   Visits: 60 visits PCY combined > approx 30 for PT! Used 1 1 1 1 1 1 1 1 1 1 1 1 1 1   Authed: PT Remaining  58 56 54 53 51 49 47 45 43 41 39 38 35 33        Date 1/4 1/6 1/10 1/12 1/20 1/25 2/1 2/3 2/7 2/9 2/15 2/17 2/21   Units:  Used 1 1 1 1 1 1 1 1 1 1 1 1 1   Authed: OT Remaining  59 57 55 52 50 48 46 44 42 40 37 36 34                Today's date: 2022  Patient name: Fabien Goldman  : 1945  MRN: [id number]  Referring provider: Cynthia Crawford DO  Dx:   Encounter Diagnosis     ICD-10-CM    1  Post covid-19 condition, unspecified  U09 9    2  Fatigue, unspecified type  R53 83                   Subjective: Patient reports from OT without change in status or complaint  Objective: See treatment diary below    TA  Pre vitals: 92 bpm, 94% O2  Polar Beats:  BPM    TE  Treadmill- 10 mins, 2 0, grade 0-4%, HR  BPM    NMR   - STS @ standard chair w/ 10# MB 3x + 10# MB slams 3x, 3 mins, 122 bpm, dyspnea 4-5  - Farmer's carry w/ 26# kettle bell,  BPM, dyspnea 4  - Speed step on/off 8'' step x 3 mins ,   BPM, dyspnea 3-4  - Med ball slams 10# x1 min, x 2 min (2nd set),  BPM, dyspnea 3-4       Assessment: Patient tolerated treatment well today  Patient able to complete exercises as directed with overall better attention to task  Noted increase in dyspnea following STS w/ MB slams likely attributable to longer duration of interval  MCCABE decreased following seated rest break   Attempted to perform STS from 8" step but patient unable without significant UE assist so used standard chair instead  Patient will benefit from continued skilled therapy to improve endurance and dynamic balance to return to PLOF  Plan: Continue per plan of care             Outcome Measures Initial Eval  1/6/22 PN  2/15/22       5xSTS 13 6 sec 13 27 sec       DGI  FGA 18/24 18/30 21/24 21/30       10 meter 1 13 m/s 1 14 m/s       6MWT 1075 ft 1340 ft                                                                  Precautions: HTN  Past Medical History:   Diagnosis Date    Arthritis     BPH (benign prostatic hypertrophy)     Hx post laser TURP    GERD (gastroesophageal reflux disease)     H/O exercise stress test     Hyperlipidemia     Hypertension     Morbid obesity ([address] )     Last Assessed:12/21/2016 ;     Plantar fascial fibromatosis     Onset:1/23/2012    PONV (postoperative nausea and vomiting)     Sleep apnea     does not wear CPAP

## 2022-02-23 ENCOUNTER — OFFICE VISIT (OUTPATIENT)
Dept: OCCUPATIONAL THERAPY | Facility: CLINIC | Age: 77
End: 2022-02-23
Payer: COMMERCIAL

## 2022-02-23 ENCOUNTER — OFFICE VISIT (OUTPATIENT)
Dept: PHYSICAL THERAPY | Facility: CLINIC | Age: 77
End: 2022-02-23
Payer: COMMERCIAL

## 2022-02-23 DIAGNOSIS — U09.9 POST COVID-19 CONDITION, UNSPECIFIED: Primary | ICD-10-CM

## 2022-02-23 DIAGNOSIS — U07.1 COVID-19: Primary | ICD-10-CM

## 2022-02-23 DIAGNOSIS — R53.83 OTHER FATIGUE: ICD-10-CM

## 2022-02-23 DIAGNOSIS — R53.83 FATIGUE, UNSPECIFIED TYPE: ICD-10-CM

## 2022-02-23 PROCEDURE — 97112 NEUROMUSCULAR REEDUCATION: CPT | Performed by: PHYSICAL THERAPIST

## 2022-02-23 PROCEDURE — 97530 THERAPEUTIC ACTIVITIES: CPT

## 2022-02-23 PROCEDURE — 97110 THERAPEUTIC EXERCISES: CPT | Performed by: PHYSICAL THERAPIST

## 2022-02-23 NOTE — PROGRESS NOTES
Daily Note     Today's date: 2022  Patient name: Jose L Keating  : 1945  MRN: [id number]  Referring provider: Santos Lara, DO  Dx:   Encounter Diagnoses   Name Primary?  COVID-19 Yes    Other fatigue        Start Time: 0800  Stop Time: 0840  Total time in clinic (min): 40 minutes    PLAN OF CARE START:22  PLAN OF CARE END: 22  FREQUENCY: 2x/wk         Subjective: "I am a very     Objective: See treatment below  Performed memory and mental manipulation of size- pt demonstrating increased difficulty with 4 word recall and required moderate VCs for memory   Performed honey bee task with recall of commands to give canine companions facility dog while stating  Animals of the alphabet focusing on memory, sustained attention in semi modal environment  Required moderate VCs for problem solivng and min VCs for recall of commands      Assessment: Tolerated treatment well  Pt demonstrating decreased sustained and divided attention as well as EF skills, with repetition and massed practice  Pt demonstrating decreased sustained attention and rquired min VCs for redirection  Pt would benefit from continued OT services to address functional cognition      Plan: Continued skilled OT per POC

## 2022-02-23 NOTE — PROGRESS NOTES
Daily Note   PN 2/15/22  POC: 3/31/22          Insurance:  AMA/CMS Eval/ Re-eval POC expires Uzma Turk #/ Referral # Total units  Start date  Expiration date Extension  Visit limitation? PT only or  PT+OT? Co-Insurance   Cigna: [facility] 2022 3/31/22  Not required NA NA NA NA 60 PCY combined PT/OT/ST Both $10    2/15/2022 3/31/22   NA NA NA NA 60 PCY combined PT/OT/ST Both $10                                                  Date                 Visits: 60 visits PCY combined > approx 30 for PT! Used 1                Authed: PT Remaining  32                     Date                Units:  Used 1               Authed: OT Remaining  31                            Today's date: 2022  Patient name: Madhuri Meehan  : 1945  MRN: [id number]  Referring provider: Katie Ho DO  Dx:   Encounter Diagnosis     ICD-10-CM    1  Post covid-19 condition, unspecified  U09 9    2  Fatigue, unspecified type  R53 83                   Subjective: Patient reports from OT with new right shoulder pain that he woke up with this morning, tender with upper trap palpation      Objective: See treatment diary below    TA  Pre vitals: 88 bpm, 96% O2  Polar Beats:  BPM    TE  Treadmill- 10 mins, 2 0, grade 0-4%, HR  BPM  - Gait w/ posterior resistance: 7 laps x 50 ft, 132 bpm    NMR   - STS @ standard chair 3 mins, 106 bpm, dyspnea 4/10  - Speed step on/off 8'' step x 3 mins ,   BPM, dyspnea 3-4  - Sidestepping w/ mini-squats: 3 min,  bpm       Assessment: Patient tolerated treatment well today  He reports right shoulder soreness that he woke up with this morning, therefore UE weights were held today  During treatment, patient HR ranged from  bpm and SpO2 from 91-97%  Patient demonstrates slight foot drag during treadmill training, but with resisted gait he displays improved hip/knee flexion for foot clearance  No LOB during todays treatment   Patient will benefit from continued skilled therapy to improve endurance and dynamic balance to return to PLOF  Plan: Continue per plan of care             Outcome Measures Initial Eval  1/6/22 PN  2/15/22       5xSTS 13 6 sec 13 27 sec       DGI  FGA 18/24 18/30 21/24 21/30       10 meter 1 13 m/s 1 14 m/s       6MWT 1075 ft 1340 ft                                                                  Precautions: HTN  Past Medical History:   Diagnosis Date    Arthritis     BPH (benign prostatic hypertrophy)     Hx post laser TURP    GERD (gastroesophageal reflux disease)     H/O exercise stress test     Hyperlipidemia     Hypertension     Morbid obesity ([address] )     Last Assessed:12/21/2016 ;     Plantar fascial fibromatosis     Onset:1/23/2012    PONV (postoperative nausea and vomiting)     Sleep apnea     does not wear CPAP

## 2022-02-28 ENCOUNTER — APPOINTMENT (OUTPATIENT)
Dept: OCCUPATIONAL THERAPY | Facility: CLINIC | Age: 77
End: 2022-02-28
Payer: COMMERCIAL

## 2022-03-01 ENCOUNTER — OFFICE VISIT (OUTPATIENT)
Dept: OCCUPATIONAL THERAPY | Facility: CLINIC | Age: 77
End: 2022-03-01
Payer: COMMERCIAL

## 2022-03-01 ENCOUNTER — OFFICE VISIT (OUTPATIENT)
Dept: PHYSICAL THERAPY | Facility: CLINIC | Age: 77
End: 2022-03-01
Payer: COMMERCIAL

## 2022-03-01 DIAGNOSIS — R53.83 FATIGUE, UNSPECIFIED TYPE: ICD-10-CM

## 2022-03-01 DIAGNOSIS — U09.9 POST COVID-19 CONDITION, UNSPECIFIED: Primary | ICD-10-CM

## 2022-03-01 DIAGNOSIS — U07.1 COVID-19: ICD-10-CM

## 2022-03-01 DIAGNOSIS — R53.83 OTHER FATIGUE: Primary | ICD-10-CM

## 2022-03-01 PROCEDURE — 97530 THERAPEUTIC ACTIVITIES: CPT

## 2022-03-01 PROCEDURE — 97112 NEUROMUSCULAR REEDUCATION: CPT

## 2022-03-01 PROCEDURE — 97110 THERAPEUTIC EXERCISES: CPT

## 2022-03-01 NOTE — PROGRESS NOTES
Daily Note   PN 2/15/22  POC: 3/31/22          Insurance:  AMA/CMS Eval/ Re-eval POC expires Hortencia Coffman #/ Referral # Total units  Start date  Expiration date Extension  Visit limitation? PT only or  PT+OT? Co-Insurance   Cigna: [facility] 2022 3/31/22  Not required NA NA NA NA 60 PCY combined PT/OT/ST Both $10    2/15/2022 3/31/22   NA NA NA NA 60 PCY combined PT/OT/ST Both $10                                                  Date                 Visits: 60 visits PCY combined > approx 30 for PT! Used 1                Authed: PT Remaining  32                     Date                Units:  Used 1               Authed: OT Remaining  31                            Today's date: 3/1/2022  Patient name: Mary Kate Arroyo  : 1945  MRN: [id number]  Referring provider: Richy Serrano DO  Dx:   Encounter Diagnosis     ICD-10-CM    1  Post covid-19 condition, unspecified  U09 9    2  Fatigue, unspecified type  R53 83                   Subjective: Patient reports from OT with no new complaints  Objective: See treatment diary below    TA  Pre vitals: 88 bpm, 96% O2, 124/76 mmHg  Polar Beats:  BPM    TE  Treadmill- 10 mins, 2 0, grade 0-4%, HR  BPM      NMR   - STS @ standard chair + 20 lb med ball 3 mins, 107 bpm, dyspnea 4/10  - Speed step on/off 8'' step + 20 lb MB x 3 mins ,   BPM, dyspnea 3-4  - Sidestepping w/ 20# MB slams: 3 min,  bpm  - Gait w/ posterior resistance: 7 laps x 50 ft, 132 bpm     Assessment: Patient tolerated treatment fair today  He reports right shoulder soreness that he woke up with this morning, therefore UE weights were held today  During treatment, patient HR ranged from  bpm and SpO2 from 91-97%  Patient challenged at times by foot clearance on TM however in overground ambulation illustrates improved clearance  No LOB during todays treatment   Patient will benefit from continued skilled therapy to improve endurance and dynamic balance to return to PLOF        Plan: Continue per plan of care             Outcome Measures Initial Eval  1/6/22 PN  2/15/22       5xSTS 13 6 sec 13 27 sec       DGI  FGA 18/24 18/30 21/24 21/30       10 meter 1 13 m/s 1 14 m/s       6MWT 1075 ft 1340 ft                                                                  Precautions: HTN  Past Medical History:   Diagnosis Date    Arthritis     BPH (benign prostatic hypertrophy)     Hx post laser TURP    GERD (gastroesophageal reflux disease)     H/O exercise stress test     Hyperlipidemia     Hypertension     Morbid obesity ([address] )     Last Assessed:12/21/2016 ;     Plantar fascial fibromatosis     Onset:1/23/2012    PONV (postoperative nausea and vomiting)     Sleep apnea     does not wear CPAP

## 2022-03-01 NOTE — PROGRESS NOTES
Daily Note     Today's date: 3/1/2022  Patient name: Earlene Nelson  : 1945  MRN: [id number]  Referring provider: Roney Galeazzi, DO  Dx:   Encounter Diagnoses   Name Primary?  Other fatigue Yes    COVID-19        Start Time: 818  Stop Time: 45  Total time in clinic (min): 27 minutes    PLAN OF CARE START:22  PLAN OF CARE END: 22  FREQUENCY: 2x/wk         Subjective: Pt was 18 min late because he needed to stop and get gas    Objective: See treatment below     -Park map [place] with focus on planning, problem solving, info organization and direction following  Requires min-mod cues for problem solving and info organization strategies throughout activity  Requires inc time for processing     -Symbol substitution worksheet with focus on problem solving and info organization  Requires max cues initially, then able to complete 1 item appropriately with significantly inc time  Sent remainder home as HEP    Assessment: Tolerated treatment well  Requires cues for problem solving and info organization throughout  Pt would benefit from continued OT services to address functional cognition      Plan: Continued skilled OT per POC

## 2022-03-02 ENCOUNTER — APPOINTMENT (OUTPATIENT)
Dept: OCCUPATIONAL THERAPY | Facility: CLINIC | Age: 77
End: 2022-03-02
Payer: COMMERCIAL

## 2022-03-03 ENCOUNTER — OFFICE VISIT (OUTPATIENT)
Dept: PHYSICAL THERAPY | Facility: CLINIC | Age: 77
End: 2022-03-03
Payer: COMMERCIAL

## 2022-03-03 ENCOUNTER — OFFICE VISIT (OUTPATIENT)
Dept: OCCUPATIONAL THERAPY | Facility: CLINIC | Age: 77
End: 2022-03-03
Payer: COMMERCIAL

## 2022-03-03 DIAGNOSIS — R53.83 OTHER FATIGUE: ICD-10-CM

## 2022-03-03 DIAGNOSIS — U07.1 COVID-19: Primary | ICD-10-CM

## 2022-03-03 DIAGNOSIS — R53.83 FATIGUE, UNSPECIFIED TYPE: ICD-10-CM

## 2022-03-03 DIAGNOSIS — U09.9 POST COVID-19 CONDITION, UNSPECIFIED: Primary | ICD-10-CM

## 2022-03-03 PROCEDURE — 97112 NEUROMUSCULAR REEDUCATION: CPT

## 2022-03-03 PROCEDURE — 97530 THERAPEUTIC ACTIVITIES: CPT

## 2022-03-03 PROCEDURE — 97110 THERAPEUTIC EXERCISES: CPT

## 2022-03-03 NOTE — PROGRESS NOTES
Daily Note   PN 2/15/22  POC: 3/31/22          Insurance:  AMA/CMS Eval/ Re-eval POC expires Matilde Armando #/ Referral # Total units  Start date  Expiration date Extension  Visit limitation? PT only or  PT+OT? Co-Insurance   Cigna: [facility] 2022 3/31/22  Not required NA NA NA NA 60 PCY combined PT/OT/ST Both $10    2/15/2022 3/31/22   NA NA NA NA 60 PCY combined PT/OT/ST Both $10                                                  Date 2/23 3/01 3/3              Visits: 60 visits PCY combined > approx 30 for PT! Used 1 1 1              Authed: PT Remaining  32 30 27                   Date 2/23 3/01 3/3             Units:  Used 1 1 1             Authed: OT Remaining  31 29 28                          Today's date: 3/3/2022  Patient name: Brissa Huerta  : 1945  MRN: [id number]  Referring provider: Cristobal Flower DO  Dx:   Encounter Diagnosis     ICD-10-CM    1  Post covid-19 condition, unspecified  U09 9    2  Fatigue, unspecified type  R53 83                   Subjective: Patient reports from OT with no new complaints  Objective: See treatment diary below    TA  Pre vitals: 88 bpm, 96% O2, 124/76 mmHg  Polar Beats:  BPM    TE  Treadmill- 10 mins, 2 2, grade 3-5%, HR  BPM    NMR    Circuit 1 (2 5 mins, 1 round) B/L 4 lb ankle weights  - Opp hand to knee Peak HR: 111 BPM, dyspnea  - STS @ [place] step + foam, Peak HR: 113 bpm, dyspnea 4/10  - Step up @ Trainer step (top step) from foam + high knee drives, Peak HR: 830 BPM, dyspnea 2/10    Circuit 2 (2 mins, 1 round) B/L 4 lb ankle weights  - Step taps 6'' step, Peak  BPM, dyspnea 3-4  - Sidestepping w/ 20# MB slams: Peak  bpm  - Speed walking w/ opp UE swing + 5lb DB: Peak  bpm     Assessment: Patient tolerated treatment fair today  Patient progressed with TM training and able to tolerate increased progressions from circuit training  Patient still requires occasional standing rest break to combat LE fatigue   Education regarding plan to DC within the next 1-2 weeks  Discussed possibility of bringing patient to the gym and establishing a good exercise routine to enable him to maintain the gains made with PT  Patient will benefit from continued skilled therapy to improve endurance and dynamic balance to return to PLOF  Plan: Continue per plan of care  Plan to DC within the next two weeks 3/3/22             Outcome Measures Initial Eval  1/6/22 PN  2/15/22       5xSTS 13 6 sec 13 27 sec       DGI  FGA 18/24 18/30 21/24 21/30       10 meter 1 13 m/s 1 14 m/s       6MWT 1075 ft 1340 ft                                                                  Precautions: HTN  Past Medical History:   Diagnosis Date    Arthritis     BPH (benign prostatic hypertrophy)     Hx post laser TURP    GERD (gastroesophageal reflux disease)     H/O exercise stress test     Hyperlipidemia     Hypertension     Morbid obesity ([address] )     Last Assessed:12/21/2016 ;     Plantar fascial fibromatosis     Onset:1/23/2012    PONV (postoperative nausea and vomiting)     Sleep apnea     does not wear CPAP

## 2022-03-03 NOTE — PROGRESS NOTES
Daily Note     Today's date: 3/3/2022  Patient name: Yuniel Jimenez  : 1945  MRN: [id number]  Referring provider: Renée Lr DO  Dx:   Encounter Diagnoses   Name Primary?  COVID-19 Yes    Other fatigue        Start Time:   Stop Time: 845  Total time in clinic (min): 38 minutes    PLAN OF CARE START:22  PLAN OF CARE END: 22  FREQUENCY: 2x/wk         Subjective: Pt was 7 min late today  Couldn't find his mask, dropped his keys and phone between the seats in his car  Objective: See treatment below     -[employer] EF worksheet with focus on info organization and prioritization, working memory, sustained attn in [employer] modal environment, problem solving and inferential reasoning  Requires mod cues throughout for problem solving  Externally distracted throughout activity    Assessment: Tolerated treatment well  Requires cues for problem solving and info organization throughout  Difficulty with sustained attn in multi modal environment  Pt would benefit from continued OT services to address functional cognition      Plan: Continued skilled OT per POC

## 2022-03-07 ENCOUNTER — OFFICE VISIT (OUTPATIENT)
Dept: PHYSICAL THERAPY | Facility: CLINIC | Age: 77
End: 2022-03-07
Payer: COMMERCIAL

## 2022-03-07 ENCOUNTER — OFFICE VISIT (OUTPATIENT)
Dept: OCCUPATIONAL THERAPY | Facility: CLINIC | Age: 77
End: 2022-03-07
Payer: COMMERCIAL

## 2022-03-07 DIAGNOSIS — R53.83 OTHER FATIGUE: Primary | ICD-10-CM

## 2022-03-07 DIAGNOSIS — U07.1 COVID-19: ICD-10-CM

## 2022-03-07 DIAGNOSIS — R53.83 FATIGUE, UNSPECIFIED TYPE: ICD-10-CM

## 2022-03-07 DIAGNOSIS — U09.9 POST COVID-19 CONDITION, UNSPECIFIED: Primary | ICD-10-CM

## 2022-03-07 PROCEDURE — 97110 THERAPEUTIC EXERCISES: CPT

## 2022-03-07 PROCEDURE — 97112 NEUROMUSCULAR REEDUCATION: CPT

## 2022-03-07 NOTE — PROGRESS NOTES
Daily Note     Today's date: 3/7/2022  Patient name: Steven Cage  : 1945  MRN: [id number]  Referring provider: Isabella Shanks DO  Dx:   Encounter Diagnoses   Name Primary?  Other fatigue Yes    COVID-19                   PLAN OF CARE START:22  PLAN OF CARE END: 22  FREQUENCY: 2x/wk         Subjective: Pt was 7 min late today  Denies changes since last session    Objective: See treatment below  -Garden spacing worksheet with focus on planning, direction following and problem solving  Completes with mildly inc time, no cues    -IQ car puzzle with focus on problem solving,  and sustained attn in multi modal environment  Requires 1 repetition of directions and inc time for processing  Assessment: Tolerated treatment well  Demonstrating improvements in sustained attn in semi modal (small room with door open and doubled with another pt), and problem solving and planning skills  Recommend continued participation in skilled occupational therapy to maximize functional cognition and participation in IADLs  Plan: Continued skilled OT per POC

## 2022-03-07 NOTE — PROGRESS NOTES
Daily Note   PN 2/15/22  POC: 3/31/22          Insurance:  AMA/CMS Eval/ Re-eval POC expires Hilary Daughters #/ Referral # Total units  Start date  Expiration date Extension  Visit limitation? PT only or  PT+OT? Co-Insurance   Cigna: [facility] 2022 3/31/22  Not required NA NA NA NA 60 PCY combined PT/OT/ST Both $10    2/15/2022 3/31/22   NA NA NA NA 60 PCY combined PT/OT/ST Both $10                                                  Date 2/23 3/01 3/3 3/7             Visits: 60 visits PCY combined > approx 30 for PT! Used 1 1 1 1             Authed: PT Remaining  32 30 27 26                  Date 2/23 3/01 3/3             Units:  Used 1 1 1             Authed: OT Remaining  31 29 28                          Today's date: 3/7/2022  Patient name: Puma Hurtado  : 1945  MRN: [id number]  Referring provider: Rian Venegas DO  Dx:   Encounter Diagnosis     ICD-10-CM    1  Post covid-19 condition, unspecified  U09 9    2  Fatigue, unspecified type  R53 83                   Subjective: Patient reports from OT with no new complaints denies current dizziness, but reports history of vertigo      Objective: See treatment diary below  PT verbal cues for technique and progression  TA  Pre vitals: 92 bpm, 95% O2, 150/80 mmHg stand  Polar Beats/ HR:  BPM    TE  Treadmill- 10 mins, 2 2, grade 3-5%, HR  BPM 4-5/10 RPE, 96% post    NMR    Circuit 1 (2 5 mins, 1 round) B/L 4 lb ankle weights  - Opp hand to knee Peak HR: 111 BPM, dyspnea  - STS @ [place] step + foam, Peak HR: 113 bpm, dyspnea 4/10 5/10 RPE  - Step up @ Trainer step (top step) from foam + high knee drives, Peak HR: 839 BPM, dyspnea 2/10    Circuit 2 (2 mins, 1 round) B/L 4 lb ankle weights  - Step taps 6'' step, Peak  BPM, dyspnea 3-4  - Sidestepping w/ 20# MB slams: Peak  bpm O2 sats 93%, 7/10 RPE   - Speed walking w/ opp UE swing + 5lb DB: Peak  bpm, 5/10 RPE, 5/10 dyspnea     Assessment: Patient tolerated treatment fair today  Patient progressed with TM training and able to tolerate increased progressions from circuit training  Patient still requires occasional standing rest break to combat LE fatigue  Spoke to patient re: vertigo- he reports no symptoms since in inpatient rehab, described as spinning/ nausea with positional change  He reports having treatment for this at "[address] [place] [address]" for "crystals" and no symptoms since  Prior vertigo possible resolved BPPV- patient instructed to let us know if it returns  Education regarding plan to DC within the next 1-2 weeks  Discussed post discharge medically based fitness- patient agreeable    Patient will benefit from continued skilled therapy to improve endurance and dynamic balance to return to PLOF  Plan: Continue per plan of care  Plan to DC within the next two weeks 3/3/22             Outcome Measures Initial Eval  1/6/22 PN  2/15/22       5xSTS 13 6 sec 13 27 sec       DGI  FGA 18/24 18/30 21/24 21/30       10 meter 1 13 m/s 1 14 m/s       6MWT 1075 ft 1340 ft                                                                  Precautions: HTN  Past Medical History:   Diagnosis Date    Arthritis     BPH (benign prostatic hypertrophy)     Hx post laser TURP    GERD (gastroesophageal reflux disease)     H/O exercise stress test     Hyperlipidemia     Hypertension     [address] [address] )     Last Assessed:12/21/2016 ;     Plantar fascial fibromatosis     Onset:1/23/2012    PONV (postoperative nausea and vomiting)     Sleep apnea     does not wear CPAP

## 2022-03-09 ENCOUNTER — OFFICE VISIT (OUTPATIENT)
Dept: PHYSICAL THERAPY | Facility: CLINIC | Age: 77
End: 2022-03-09
Payer: COMMERCIAL

## 2022-03-09 ENCOUNTER — OFFICE VISIT (OUTPATIENT)
Dept: OCCUPATIONAL THERAPY | Facility: CLINIC | Age: 77
End: 2022-03-09
Payer: COMMERCIAL

## 2022-03-09 DIAGNOSIS — U09.9 POST COVID-19 CONDITION, UNSPECIFIED: Primary | ICD-10-CM

## 2022-03-09 DIAGNOSIS — R53.83 FATIGUE, UNSPECIFIED TYPE: ICD-10-CM

## 2022-03-09 DIAGNOSIS — U07.1 COVID-19: Primary | ICD-10-CM

## 2022-03-09 PROCEDURE — 97112 NEUROMUSCULAR REEDUCATION: CPT

## 2022-03-09 PROCEDURE — 97530 THERAPEUTIC ACTIVITIES: CPT | Performed by: OCCUPATIONAL THERAPIST

## 2022-03-09 PROCEDURE — 97110 THERAPEUTIC EXERCISES: CPT

## 2022-03-09 NOTE — PROGRESS NOTES
Daily Note     Today's date: 3/9/2022  Patient name: Jacob Gómez  : 1945  MRN: [id number]  Referring provider: Allison Calvert,   Dx:   Encounter Diagnosis   Name Primary?  COVID-19 Yes       Start Time: 815  Stop Time: 845  Total time in clinic (min): 30 minutes    PLAN OF CARE START:22  PLAN OF CARE END: 22  FREQUENCY: 2x/wk  Visit: 10 of 20       Subjective: Pt was 15 min late today  Denies changes since last session    Objective: See treatment below     -Deductive reasoning worksheet completed focusing on EF  Requires Alexys and significantly increased time to complete   -Started anagrams worksheet during session focusing on mental manipulation/flexibility and problem solving  Completed 3 items with min prompts and rest provided for HEP  Assessment: Tolerated treatment well  Demonstrated difficulty with deductive reasoning  Recommend continued participation in skilled occupational therapy to maximize functional cognition and participation in IADLs  Plan: Continued skilled OT per POC

## 2022-03-09 NOTE — PROGRESS NOTES
Daily Note   PN 2/15/22  POC: 3/31/22          Insurance:  AMA/CMS Eval/ Re-eval POC expires Vanessa Orozco #/ Referral # Total units  Start date  Expiration date Extension  Visit limitation? PT only or  PT+OT? Co-Insurance   Cigna: [facility] 2022 3/31/22  Not required NA NA NA NA 60 PCY combined PT/OT/ST Both $10    2/15/2022 3/31/22   NA NA NA NA 60 PCY combined PT/OT/ST Both $10                                                  Date 2/23 3/01 3/3 3/7 3/9            Visits: 60 visits PCY combined > approx 30 for PT! Used 1 1 1 1             Authed: PT Remaining  32 30 27 25 23                 Date 2/23 3/01 3/3 3/7 3/9           Units:  Used 1 1  1 1           Authed: OT Remaining  31 29 28 26 24                        Today's date: 3/9/2022  Patient name: Ayah Ridley  : 1945  MRN: [id number]  Referring provider: Juan Antonio Potts DO  Dx:   Encounter Diagnosis     ICD-10-CM    1  Post covid-19 condition, unspecified  U09 9    2  Fatigue, unspecified type  R53 83                   Subjective: Patient reports from OT with no new complaints  Objective: See treatment diary below  PT verbal cues for technique and progression  Pre vitals: 88 bpm, 96% O2, 146/96 mmHg stand    TE  Treadmill- 10 mins, 2 2, grade 3-4%, HR  BPM 4-5/10 RPE, 96% post    NMR    Circuit (2 mins, 1 round) B/L 4 lb ankle weights  - Step taps, 6" step,  BPM  SpO2 96%  - STS > MB toss (10#),  BPM, SpO2 93%  - Step up @ 6" step + high knee drive,  BPM, SpO2 92%  - Lat step up/over 6" step,  BPM, SpO2 97%      Assessment: Patient tolerated treatment well today  Patient displayed limited fatigue throughout session and appropriate HR and SpO2 response as outlined above  Discussed plan to complete re-evaluation at next visit with likely discharge following that  Patient in agreement with plan  Patient will benefit from continued skilled therapy to improve endurance and dynamic balance to return to OF  Plan: Continue per plan of care  Plan to DC within the next two weeks 3/3/22             Outcome Measures Initial Eval  1/6/22 PN  2/15/22       5xSTS 13 6 sec 13 27 sec       DGI  FGA 18/24 18/30 21/24 21/30       10 meter 1 13 m/s 1 14 m/s       6MWT 1075 ft 1340 ft                                                                  Precautions: HTN  Past Medical History:   Diagnosis Date    Arthritis     BPH (benign prostatic hypertrophy)     Hx post laser TURP    GERD (gastroesophageal reflux disease)     H/O exercise stress test     Hyperlipidemia     Hypertension     Morbid obesity ([address] )     Last Assessed:12/21/2016 ;     Plantar fascial fibromatosis     Onset:1/23/2012    PONV (postoperative nausea and vomiting)     Sleep apnea     does not wear CPAP

## 2022-03-14 ENCOUNTER — APPOINTMENT (OUTPATIENT)
Dept: OCCUPATIONAL THERAPY | Facility: CLINIC | Age: 77
End: 2022-03-14
Payer: COMMERCIAL

## 2022-03-15 ENCOUNTER — EVALUATION (OUTPATIENT)
Dept: PHYSICAL THERAPY | Facility: CLINIC | Age: 77
End: 2022-03-15
Payer: COMMERCIAL

## 2022-03-15 ENCOUNTER — EVALUATION (OUTPATIENT)
Dept: OCCUPATIONAL THERAPY | Facility: CLINIC | Age: 77
End: 2022-03-15
Payer: COMMERCIAL

## 2022-03-15 DIAGNOSIS — U09.9 POST COVID-19 CONDITION, UNSPECIFIED: Primary | ICD-10-CM

## 2022-03-15 DIAGNOSIS — U07.1 COVID-19: Primary | ICD-10-CM

## 2022-03-15 DIAGNOSIS — R53.83 FATIGUE, UNSPECIFIED TYPE: ICD-10-CM

## 2022-03-15 PROCEDURE — 97530 THERAPEUTIC ACTIVITIES: CPT

## 2022-03-15 PROCEDURE — 96125 COGNITIVE TEST BY HC PRO: CPT | Performed by: OCCUPATIONAL THERAPIST

## 2022-03-15 NOTE — PROGRESS NOTES
OCCUPATIONAL THERAPY RE-EVALUATION:        3/15/22  Suzi Thrasher  1945  [id number]  Angela HoustonkarenDO    Diagnosis ICD-10-CM Associated Orders   1  COVID-19  U07 1 Ambulatory referral to Occupational Therapy   2  Other fatigue  R53 83 Ambulatory referral to Occupational Therapy      Precautions: reports hx B RC tears, poor activity tolerance     Assessment/Plan     SKILLED ANALYSIS:  Pt is seen for OT re-evaluation after 2 months of skilled OT services with focus on functional cognition and UE strengthening  Pt demonstrates overall improvement in cognitive function since initial evaluation with current RBANS score in the 39th percentile, and in the average range  Pt demonstrated slight decline in scores for immediate and delayed recall, attention, and language, and maintained his score for visuospatial/constructional skills  He achieved 2 STGs for attention in semimodal and multimodal environments  Based on results of OT re-eval plan for 1 more appointment to ensure understanding of HEP and then d/c  Pt indicated understanding  PLAN OF CARE START:1/4/22  PLAN OF CARE END: 4/2/22  FREQUENCY: 2x/wk     SUBJECTIVE: Pt reports decreased fatigue since starting therapy  He states that some days are better than others in regards to his cognitive function  Pt's Goal: to improve his memory and attn     Occupational Profile: Pt works FT as a [employer] however has been out of work since having COVID in early December 2021  He reports that he used to enjoy going to the gym, however has not been going since the pandemic started  He states he sometimes enjoys watching hockey, but doesn't have many hobbies  He enjoys traveling   He lives alone and is engaged       PAIN:  At rest  0/10     OBJECTIVE:     ASSESS NEXT SESSION: CHARLIE   Gross grasp strength:  R: 86lbs  L: 77lbs      ASSESS NEXT SESSION: MMT:  RUE  Shoulder flexion 4/5  Shoulder abduction 4/5  Elbow flexion 5/5  Elbow extension 5/5     LUE  Shoulder flexion 4-/5  Elbow flexion 4/5  Elbow extension 4/5           Assessments  The Repeatable Battery for the Assessment of Neuropsychological Status (RBANS) is a brief, individually-administered assessment which measures attention, language, visuospatial/constructional abilities, and immediate & delayed memory  The RBANS is intended for use with adolescents to adults, ages 15 to 80 years  The following results were obtained during the administration of the assessment      Form: D     Cognitive Domain/Subtest: Index Score: Percentile Rank: Classification: IE: Status:   IMMEDIATE MEMORY 94  Average 78 Slight decline        1  List Learning (21/40)            2  Story Memory (17/24)               VISUOSPATIAL/  CONSTRUCTIONAL 131 >75%ile Very Superior 121 Maintained        3  Figure Copy (20/20)            4  Line Orientation (20/20)               LANGUAGE 85 51-75%ile Average 75 Slight decline        5  Picture Naming (10/10)            6  Semantic Fluency (11/40)               ATTENTION 85  Borderline 82 MILD DECLINE         7  Digit Span (8/16)            8  Coding (34/89)               DELAYED MEMORY 94 List recall: 26-50%ile    List Recognition: 51-75%ile Average 95  declined        9  List Recall (5/10)            10  List Recognition (18/20)            11  Story Recall (5/12)            12  Figure Recall (20/20)                Sum of Index Scores:  497   451     Total Scale:  99    86     Percentile: 47th%ile    18th %ile     Classification: Average    Low average           IE indicates the scores from the initial evaluation (1/4/22)  Form: D     SHORT TERM GOALS 4-6 weeks:      Memory   -Pt will demo G carryover of use of internal/external memory strategy aides for improved recall of daily events, with 75% accuracy PROGRESSING  -Pt will increase immediate recall being able to recall 34/64 items on RBANS in STM   ACHIEVED  -Pt will increase delayed recall being able to recall 41/62 items on RBANs in LTM ACHIEVED     Upper Extremity   -Pt will improve L  strength by 3 lbs in order to complete IADLs  ACHIEVED  -Pt will improve R elbow strength to 4+/5 in order to complete IADLs  ACHIEVED     Attention     ? Pt will maintain attention to task for 20 minutes in semi modal environment for baseline performance,  improved role performance and to improve learning and to simulate return to IADLs  ACHIEVED  ?  Pt will demo ability to participate in dual tasking/divided attention task with 60% accuracy in multimodal environment to simulate return to life roles [address] 8-12 weeks  Memory   -Pt will demo G carryover of use of internal/external memory strategy aides for improved recall of daily events, with 90% accuracy   -Pt will increase immediate recall being able to recall 37/64 items on RBANS in STM  ACHIEVED  -Pt will increase delayed recall being able to recall 43/62 items on RBANs in LTM ACHIEVED     Attention     ? Pt will maintain attention to task for 20 minutes in multi modal environment for baseline performance,  improved role performance and to improve learning and to simulate return to IADLs  NOT ACHIEVED  ?  Pt will demo ability to participate in dual tasking/divided attention task with 90% accuracy in multimodal environment to simulate return to life roles NOT ACHIEVED     Upper Extremity   -Pt will improve L  strength by 6 lbs in order to complete IADLs   -Pt will improve R elbow strength to 5/5 in order to complete IADLs          TIME SPENT  90 min for administration, documentation, interpretation, scoring adn POC development RBANS     PLANNED THERAPY INTERVENTIONS:  Internal and external memory aides  Hypersensitivity strategies education  Multi-modal environment  Sustained/alternating/divided attention  Temporal Awareness: Organize the Hour activities  Memory and mental manipulation  Auditory processing with immediate recall  Memory retention with immediate and delayed recall  Edu on cog/vision apps           Date 1/6 1/10 1/12 1/18 1/20 1/25 1/26 2/1 2/3         Visits: 60 visits PCY combined > approx 30 for PT!  Used 1 1 1 1 1 1 1 1 1         Authed: PT Remaining  58 56 54 53 51 49 47 45 43                 Date 1/4 1/6 1/10 1/12 1/20 1/25 2/1 2/3           Units:  Used 1 1 1 1 1 1 1 1           Authed: OT Remaining  59 57 55 52 50 48 46 44

## 2022-03-15 NOTE — PROGRESS NOTES
PT-Re-Eval/Discharge Note          Insurance:  AMA/CMS Eval/ Re-eval POC expires Erasmo Fernandez #/ Referral # Total units  Start date  Expiration date Extension  Visit limitation? PT only or  PT+OT? Co-Insurance   [employer]: [facility] 2022 3/31/22  Not required NA NA NA NA 60 PCY combined PT/OT/ST Both $10    2/15/2022 3/31/22   NA NA NA NA 60 PCY combined PT/OT/ST Both $10                                                  Date 2/23 3/01 3/3 3/7 3/9 3/15           Visits: 60 visits PCY combined > approx 30 for PT! Used 1 1            Authed: PT Remaining  32 30 27 25 23 21                Date 2/23 3/01 3/3 3/7 3/9 3/15          Units:  Used 1 1  1          Authed: OT Remaining  31 29 28 26 24 22                       Today's date: 3/15/2022  Patient name: Silke Cunha  : 1945  MRN: [id number]  Referring provider: Margaret Truong DO  Dx:   Encounter Diagnosis     ICD-10-CM    1  Post covid-19 condition, unspecified  U09 9    2  Fatigue, unspecified type  R53 83          Assessment  Assessment details: Patient is a 68 y o  Male who has been attending skilled OPPT since 2021 due to COVID-19 infection in 2021 which resulted in decrease in function due to impaired endurance, shortness of breath, and fatigue which in turn prevented him from working  Over the past two months patient has demonstrated improvements in endurance and balance which have allowed return to work  Since initial evaluation, patient has demonstrated improvement in cardiovascular endurance per 450 increase in 6MWT  Additionally, patient increased self-selected gait speed by 0 26 m/s to 1 39 m/s which allows unlimited community ambulation, including safely crossing a street  Patient has demonstrated 4-point improvement in FGA and 2-point improvement in DGI, suggesting impaired dynamic balance  Both scores are above the cutoff for fall risk at this time  Patient has met all short term goals and 4/6 long term goals at this time  Referred patient to gym program to continue to maintain and improve fitness gains  Due to objective and functional progress since beginning therapy, patient may be discharged from skilled PT at this time  Please contact me if you have any questions or recommendations  Thank you for the referral and the opportunity to share in [address]  care  Patient verbalized understanding of POC        Impairments: Activity intolerance, Impaired balance, Lacks appropriate HEP and Safety issue  Understanding of Dx/Px/POC: Good  Prognosis: Good      Cut off score   All date taken from APTA Neuro Section or Rehab Measures      [place]/64  MDC: 6 pts  Age Norms:  61-76: M - 54   F - 55  70-79: M - 47   F - 53  80-89: M - 48   F - 50 5xSTS: Fransisco et al 2010  MDC: 2 3 sec  Age Norms:  60-69: 11 1 sec  70-79: 12 6 sec  80-89: 14 8 sec   TUG  MDC: 4 14 sec  Cut off score:  >13 5 sec community dwelling adults  >32 2 frail elderly  <20 I for basic transfers  >30 dependent on transfers 10 Meter Walk Test: Franci Marcos and An lin 2011  20-29: M - 1 35 m   F - 1 34 m  30-39: M - 1 43 m   F - 1 34 m  40-49: M - 1 43 m   F - 1 39 m  50-59: M - 1 43 m   F - 1 31 m  60-69: M - 1 34 m   F - 1 24 m  70-79: M - 1 26 m   F - 1 13 m  80-89: M - 0 97 m   F - 0 94 m   FGA  MCID: 4 pts  Geriatrics/community < 22/30 fall risk  Geriatrics/community < 20/30 unexplained falls    DGI  MDC: vestibular - 4 pts  MDC: geriatric/community - 3 pts  Falls risk <19/24 mCTSIB  Norm: 20-60 yrs  Eyes open firm: norm sway 0 21-0 48  Eyes closed firm: norm sway 0 48-0 99  Eyes open foam: norm sway 0 38-0 71  Eyes closed foam: norm sway 0 70-2 22   6 Minute Walk Test  MDC: 190 98 ft  MCID: 164 ft    Age Norms  61-76: M - 1876 ft (571 80 m)  F - 1765 ft (537 98 m)  70-79: M - 1729 ft (527 00 m)  F - 1545 ft (470 92 m)  80-89: M - 1368 ft (416 97 m)  F - 1286 ft (391 97 m) ABC: Myrtle Marshall & Inell Button, 2003  <67% increased risk for falls           Goals  Short Term Goals (4-6 weeks):  - Patient will be independent with simple HEP - MET  - Patient will improve with DGI by 3 points per MDC to promote improved safety with dynamic tasks- MET  - Patient will improve FGA score by 4 points per MDC to promote improved safety with dynamic tasks- MET  - Patient will improve 6 Minute Walk Test score by 190 feet from 1075 feet to 1265 feet to promote improved cardiovascular endurance- MET      Long Term Goals (12 weeks):  - Patient will be independent with complex HEP - MET (referred to gym)  - Patient will be able to perform 15 minutes of aerobic activity at HR 70% max to facilitate return to sport/normal functional tasks - MET  - Patient will complete work related tasks without exacerbation of symptoms in order to maximize function and promote return to work - MET  - Patient will complete at least 1729 feet in 6 Minute Walk Test to meet age- and gender-matched norm for cardiovascular endurance - NOT MET  - Patient will report going on walks at least 3 days per week to promote independence and improved cardiovascular endurance - NOT MET, progressing  - Patient will report ability to return to work without restriction to demonstrate return to PLOF - MET        Plan  Plan details: Discharge from skilled PT     Patient would benefit from: Skilled PT and Skilled OT  Planned modality interventions: Biofeedback  Planned therapy interventions: Balance, Breathing training, Body mechanics training, Gait training, HEP, Manual therapy, Neuromuscular re-education, Patient education, Strengthening, Stretching, Therapeutic activities, Therapeutic exercises and Work reintegration  Frequency: 2x/wk  Duration in weeks: 12  Plan of Care beginning date: 1/6/22  Plan of Care expiration date: 12 weeks - 3/31/22  Treatment plan discussed with: patient        Subjective Evaluation    History of Present Illness  Mechanism of injury: Pt presents to therapy following COVID in early December and subsequent UTI which he is following with urologist  Patient was hospitalized 12/13 to 12/17 and has felt significant fatigue since then  Patient frequently feels like "heart is racing fast" with activity and he feels lethargic  Patient also notes "wheezing" in chest and takes medication for cough  Patient is not working due to this condition due to being physically unable to climb several stories in fatigued state; hoping to return to work by end of January  Patient notes that when he had covid he felt withdrawn from others but feels back to normal now  Pt goal for therapy is to "snap out of this condition and get back to work "    Update 3/15/22: Patient reports that he is "without a doubt" stronger; he still has "fog" which is focus in OT  He is back to work without restriction  Dizziness Subjective  None reported  Pain  Nothing relevant (hx of arthritis)  Wearing B/L knee braces due to arthritis  Social Support  Steps to enter house: 2+1 without railing  Stairs in house: has 2nd floor but does not need to access it  Lives in: house  Lives with: alone    Employment status: [employer], not working currently due to [employer] dominance: [employer]    Treatments  Previous treatment: Occupational therapy   Current treatment: PT + OT  Diagnostic Testing: COVID testing, urine culture       Objective     [place] Question:     - Were you in the ICU? No   - Were you on a ventilator? No   (if yes continue with  PTSD, PHQ -9 screenings)     - Do you feel that your voice has changed? No  - Are you having difficulty with swallowing? No   (if yes referral to speech therapy)    - Difficulty with participation in ADL and IADL? Yes - attending OT   (if yes determine if referral to OT is appropriate)    - Breathing Difficulty?  - Has pulmonary function test been completed?  No   (If yes continue with breathing evaluation)    Bowel/Bladder changes: (referral for pelvic therapy)   - Have you had any new onset of urinary or bowel leakage, EVEN JUST A LITTLE BIT, since onset of Covid? Yes - may be related to urinary tract issues  - Have you had any new difficulty with starting a urine stream or a bowel movement since onset of Covid?  No       Objective:      Vitals:   - BP: 138/80   - HR: 90 bpm  - SP02: 94%    LE MMT:  - R Hip Flexion: 5/5  L Hip Flexion: 5/5  - R Hip Extension: 5/5  L Hip Extension: 5/5  - R Hip Abduction: 5/5  L Hip Abduction: 5/5  - R Hip Adduction: 5/5  L Hip Adduction: 5/5  - R Knee Extension: 5/5 L Knee Extension: 5/5  - R Knee Flexion: 5/5  L Knee Flexion: 5/5  - R Ankle DF: 5/5  L Ankle DF: 5/5  - R Ankle PF: 5/5  L Ankle PF: 5/5      Functional Outcome Measures:  - 6 Minute Walk Test: 1075 ft  - Gait Speed : 1 13 m/s  - 5x Sit to stand: 13 6 sec  - FGA  score: 18/30       Breathing evaluation:     Describe breathing: via: room air    Breathing pattern: WNL    Head Thrust L/R: (-) B/L, 1 saccadic beat to correct for R head turn 1x  VOR Cx: (-) Vertical/horizontal (no reports of dizziness)  VOR x1: (-) no reports of increased dizziness and able to track well    Cleared C-Spine prior to testing above   - Normal cervical AROM (hypomobility)  - Alar ligament testing (-)  - Sharp Michael (-)  - Modified VBI (-)      Outcome Measures Initial Eval  1/6/22 PN  2/15/22 RE  3/15/22      5xSTS 13 6 sec 13 27 sec 7 5 sec       DGI  FGA 18/24 18/30 21/24 21/30 23/24 25/30      10 meter 1 13 m/s 1 14 m/s 1 39 m/s      6MWT 1075 ft 1340 ft 1525 feet                                                                 Precautions: HTN  Past Medical History:   Diagnosis Date    Arthritis     BPH (benign prostatic hypertrophy)     Hx post laser TURP    GERD (gastroesophageal reflux disease)     H/O exercise stress test     Hyperlipidemia     Hypertension     Morbid obesity ([address] )     Last Assessed:12/21/2016 ;     Plantar fascial fibromatosis     Onset:1/23/2012    PONV (postoperative nausea and vomiting)     Sleep apnea     does not wear CPAP

## 2022-03-16 ENCOUNTER — APPOINTMENT (OUTPATIENT)
Dept: OCCUPATIONAL THERAPY | Facility: CLINIC | Age: 77
End: 2022-03-16
Payer: COMMERCIAL

## 2022-03-17 ENCOUNTER — APPOINTMENT (OUTPATIENT)
Dept: PHYSICAL THERAPY | Facility: CLINIC | Age: 77
End: 2022-03-17
Payer: COMMERCIAL

## 2022-03-17 ENCOUNTER — OFFICE VISIT (OUTPATIENT)
Dept: OCCUPATIONAL THERAPY | Facility: CLINIC | Age: 77
End: 2022-03-17
Payer: COMMERCIAL

## 2022-03-17 DIAGNOSIS — U07.1 COVID-19: Primary | ICD-10-CM

## 2022-03-17 DIAGNOSIS — R53.83 OTHER FATIGUE: ICD-10-CM

## 2022-03-17 PROCEDURE — 97530 THERAPEUTIC ACTIVITIES: CPT

## 2022-03-17 NOTE — PROGRESS NOTES
Daily Note     Today's date: 3/17/2022  Patient name: Fernanda Santiago  : 1945  MRN: [id number]  Referring provider: Santiago Avila, DO  Dx:   Encounter Diagnoses   Name Primary?  COVID-19 Yes    Other fatigue        Start Time: 0800  Stop Time: 8270  Total time in clinic (min): 55 minutes    PLAN OF CARE START:22  PLAN OF CARE END: 22  FREQUENCY: 2x/wk  Visit:      Subjective: "This morning I was going to do something and then I completely forgot what I was doing "    Objective: See treatment below  Continued Outcome Measures from last re-evaluation:     MMT:  RUE  Shoulder flexion 4/5  Shoulder abduction 4/5  Elbow flexion 5/5  Elbow extension 5/5     LUE  Shoulder flexion 4-/5  Elbow flexion 4/5  Elbow extension 4/5    Therapeutic Activity    IQ Puzzler 1 Dimension  Able to complete puzzle independently, slowed performance     Reviewed Compensatory Memory Strategies  Word Unscramble   Immediate Recall: 6/10    Sustained Attention  Spot it   Slowed visual perception performance   Completed 100% of deck in 15 minutes    Executive Functioning  Go in Reverse and Split up the Words    Assessment: Tolerated treatment well  Pt session focused on sustained attention and executive functioning  Pt continues to demonstrate difficulty with sustained attention, exhibited during Spot it and executive functioning tasks  Reviewed compensatory memory strategies, however, demonstrated difficulty with carry over into work unscramble  Recommend continued participation in skilled occupational therapy to maximize functional cognition and participation in IADLs  Plan: Continued skilled OT per POC

## 2022-03-21 ENCOUNTER — OFFICE VISIT (OUTPATIENT)
Dept: OCCUPATIONAL THERAPY | Facility: CLINIC | Age: 77
End: 2022-03-21
Payer: COMMERCIAL

## 2022-03-21 ENCOUNTER — APPOINTMENT (OUTPATIENT)
Dept: PHYSICAL THERAPY | Facility: CLINIC | Age: 77
End: 2022-03-21
Payer: COMMERCIAL

## 2022-03-21 DIAGNOSIS — R53.83 OTHER FATIGUE: ICD-10-CM

## 2022-03-21 DIAGNOSIS — U07.1 COVID-19: Primary | ICD-10-CM

## 2022-03-21 PROCEDURE — 97530 THERAPEUTIC ACTIVITIES: CPT

## 2022-03-23 ENCOUNTER — APPOINTMENT (OUTPATIENT)
Dept: OCCUPATIONAL THERAPY | Facility: CLINIC | Age: 77
End: 2022-03-23
Payer: COMMERCIAL

## 2022-03-23 ENCOUNTER — APPOINTMENT (OUTPATIENT)
Dept: PHYSICAL THERAPY | Facility: CLINIC | Age: 77
End: 2022-03-23
Payer: COMMERCIAL

## 2022-03-28 ENCOUNTER — APPOINTMENT (OUTPATIENT)
Dept: OCCUPATIONAL THERAPY | Facility: CLINIC | Age: 77
End: 2022-03-28
Payer: COMMERCIAL

## 2022-03-29 ENCOUNTER — APPOINTMENT (OUTPATIENT)
Dept: PHYSICAL THERAPY | Facility: CLINIC | Age: 77
End: 2022-03-29
Payer: COMMERCIAL

## 2022-03-29 ENCOUNTER — APPOINTMENT (OUTPATIENT)
Dept: OCCUPATIONAL THERAPY | Facility: CLINIC | Age: 77
End: 2022-03-29
Payer: COMMERCIAL

## 2022-03-30 ENCOUNTER — APPOINTMENT (OUTPATIENT)
Dept: OCCUPATIONAL THERAPY | Facility: CLINIC | Age: 77
End: 2022-03-30
Payer: COMMERCIAL

## 2022-03-31 ENCOUNTER — APPOINTMENT (OUTPATIENT)
Dept: OCCUPATIONAL THERAPY | Facility: CLINIC | Age: 77
End: 2022-03-31
Payer: COMMERCIAL

## 2022-03-31 ENCOUNTER — APPOINTMENT (OUTPATIENT)
Dept: PHYSICAL THERAPY | Facility: CLINIC | Age: 77
End: 2022-03-31
Payer: COMMERCIAL

## 2022-04-22 RX ORDER — MELOXICAM 15 MG/1
15 TABLET ORAL DAILY
OUTPATIENT
Start: 2022-04-22

## 2022-04-25 DIAGNOSIS — M15.9 PRIMARY OSTEOARTHRITIS INVOLVING MULTIPLE JOINTS: Primary | ICD-10-CM

## 2022-04-25 RX ORDER — MELOXICAM 15 MG/1
15 TABLET ORAL DAILY PRN
Qty: 90 TABLET | Refills: 1 | Status: SHIPPED | OUTPATIENT
Start: 2022-04-25

## 2022-04-25 NOTE — TELEPHONE ENCOUNTER
Can we ask him who's name is on the bottle of meloxicam?  Our records show it was from "historical provider" so maybe not in [facility], so it was not refilled

## 2022-04-25 NOTE — TELEPHONE ENCOUNTER
Does patient need an apt?   He wondering why and why no one called him on Friday when he called it in

## 2022-06-02 ENCOUNTER — TELEPHONE (OUTPATIENT)
Dept: PULMONOLOGY | Facility: CLINIC | Age: 77
End: 2022-06-02

## 2022-06-02 NOTE — TELEPHONE ENCOUNTER
2nd attempt, MB is full  I lm on daughter's vm  Armand Abernathy: please send letter to pt regular mail to notify him we have to reschedule his appt

## 2022-10-24 DIAGNOSIS — M15.9 PRIMARY OSTEOARTHRITIS INVOLVING MULTIPLE JOINTS: ICD-10-CM

## 2022-10-25 RX ORDER — MELOXICAM 15 MG/1
TABLET ORAL
Qty: 30 TABLET | Refills: 0 | Status: SHIPPED | OUTPATIENT
Start: 2022-10-25

## 2022-12-17 PROBLEM — I71.43 INFRARENAL ABDOMINAL AORTIC ANEURYSM (AAA) WITHOUT RUPTURE: Status: ACTIVE | Noted: 2019-04-23

## 2022-12-21 ENCOUNTER — OFFICE VISIT (OUTPATIENT)
Dept: FAMILY MEDICINE CLINIC | Facility: CLINIC | Age: 77
End: 2022-12-21

## 2022-12-21 VITALS
HEIGHT: 69 IN | BODY MASS INDEX: 33.95 KG/M2 | TEMPERATURE: 97.2 F | RESPIRATION RATE: 18 BRPM | OXYGEN SATURATION: 97 % | WEIGHT: 229.2 LBS | SYSTOLIC BLOOD PRESSURE: 122 MMHG | DIASTOLIC BLOOD PRESSURE: 80 MMHG | HEART RATE: 79 BPM

## 2022-12-21 DIAGNOSIS — M17.11 PRIMARY LOCALIZED OSTEOARTHRITIS OF RIGHT KNEE: Primary | ICD-10-CM

## 2022-12-21 DIAGNOSIS — E66.9 OBESITY (BMI 30-39.9): ICD-10-CM

## 2022-12-21 DIAGNOSIS — Z13.83 SCREENING FOR CARDIOVASCULAR, RESPIRATORY, AND GENITOURINARY DISEASES: ICD-10-CM

## 2022-12-21 DIAGNOSIS — Z13.89 SCREENING FOR CARDIOVASCULAR, RESPIRATORY, AND GENITOURINARY DISEASES: ICD-10-CM

## 2022-12-21 DIAGNOSIS — I71.43 INFRARENAL ABDOMINAL AORTIC ANEURYSM (AAA) WITHOUT RUPTURE: ICD-10-CM

## 2022-12-21 DIAGNOSIS — M15.9 PRIMARY OSTEOARTHRITIS INVOLVING MULTIPLE JOINTS: ICD-10-CM

## 2022-12-21 DIAGNOSIS — Z13.1 SCREENING FOR DIABETES MELLITUS (DM): ICD-10-CM

## 2022-12-21 DIAGNOSIS — Z13.6 SCREENING FOR CARDIOVASCULAR, RESPIRATORY, AND GENITOURINARY DISEASES: ICD-10-CM

## 2022-12-21 DIAGNOSIS — M25.50 ARTHRALGIA, UNSPECIFIED JOINT: ICD-10-CM

## 2022-12-21 RX ORDER — MELOXICAM 15 MG/1
15 TABLET ORAL DAILY PRN
Qty: 90 TABLET | Refills: 1 | Status: SHIPPED | OUTPATIENT
Start: 2022-12-21

## 2022-12-21 NOTE — PROGRESS NOTES
Assessment/Plan:    No problem-specific Assessment & Plan notes found for this encounter  AAA f/u advised    athralgias better on mobic, nsaid risks aware    Wt loss plan aware, bmi 35, girlfriend on phone aware     Diagnoses and all orders for this visit:    Primary localized osteoarthritis of right knee    Obesity (BMI 30-39  9)    Infrarenal abdominal aortic aneurysm (AAA) without rupture  -     US abdominal aorta; Future    Primary osteoarthritis involving multiple joints  -     meloxicam (MOBIC) 15 mg tablet; Take 1 tablet (15 mg total) by mouth daily as needed for moderate pain    Arthralgia, unspecified joint    Screening for diabetes mellitus (DM)  -     Comprehensive metabolic panel; Future    Screening for cardiovascular, respiratory, and genitourinary diseases  -     Lipid Panel with Direct LDL reflex; Future        Return in about 6 months (around 6/21/2023) for Recheck  Subjective:      Patient ID: Priscila Dotson is a 68 y o  male  Chief Complaint   Patient presents with   • Follow-up     Blood sugar readings  jmcma       HPI  Taking mobic 15mg/d, tolerates well, nsaids risks aware    Notes shakey and sweaty when he gets hungry  Occurs in morning  Never checked his sugar    Following no diet  Started exercising    Not counting calories    The following portions of the patient's history were reviewed and updated as appropriate: allergies, current medications, past family history, past medical history, past social history, past surgical history and problem list     Review of Systems   Constitutional: Negative for fever  Musculoskeletal: Positive for arthralgias  Neurological: Negative for syncope           Current Outpatient Medications   Medication Sig Dispense Refill   • meloxicam (MOBIC) 15 mg tablet Take 1 tablet (15 mg total) by mouth daily as needed for moderate pain 90 tablet 1   • loratadine (CLARITIN) 10 mg tablet Take 1 tablet (10 mg total) by mouth daily (Patient not taking: Reported on 12/21/2022) 20 tablet 0     No current facility-administered medications for this visit  Objective:    /80   Pulse 79   Temp (!) 97 2 °F (36 2 °C)   Resp 18   Ht 5' 9" (1 753 m)   Wt 104 kg (229 lb 3 2 oz)   SpO2 97%   BMI 33 85 kg/m²        Physical Exam  Vitals and nursing note reviewed  Constitutional:       General: He is not in acute distress  Appearance: He is well-developed  He is obese  He is not ill-appearing  HENT:      Head: Normocephalic  Right Ear: Tympanic membrane normal       Left Ear: Tympanic membrane normal    Eyes:      General: No scleral icterus  Conjunctiva/sclera: Conjunctivae normal    Cardiovascular:      Rate and Rhythm: Normal rate and regular rhythm  Pulmonary:      Effort: Pulmonary effort is normal  No respiratory distress  Breath sounds: No wheezing  Abdominal:      Palpations: Abdomen is soft  Tenderness: There is no abdominal tenderness  Musculoskeletal:         General: No deformity  Cervical back: Neck supple  Right lower leg: No edema  Left lower leg: No edema  Skin:     General: Skin is warm and dry  Coloration: Skin is not pale  Neurological:      Mental Status: He is alert  Motor: No weakness  Gait: Gait normal    Psychiatric:         Behavior: Behavior normal          Thought Content:  Thought content normal                 Abigail Griffin DO

## 2022-12-23 LAB
ALBUMIN SERPL-MCNC: 4.3 G/DL (ref 3.7–4.7)
ALBUMIN/GLOB SERPL: 1.9 {RATIO} (ref 1.2–2.2)
ALP SERPL-CCNC: 65 IU/L (ref 44–121)
ALT SERPL-CCNC: 14 IU/L (ref 0–44)
AST SERPL-CCNC: 17 IU/L (ref 0–40)
BILIRUB SERPL-MCNC: 0.6 MG/DL (ref 0–1.2)
BUN SERPL-MCNC: 21 MG/DL (ref 8–27)
BUN/CREAT SERPL: 21 (ref 10–24)
CALCIUM SERPL-MCNC: 9.4 MG/DL (ref 8.6–10.2)
CHLORIDE SERPL-SCNC: 102 MMOL/L (ref 96–106)
CHOLEST SERPL-MCNC: 164 MG/DL (ref 100–199)
CO2 SERPL-SCNC: 27 MMOL/L (ref 20–29)
CREAT SERPL-MCNC: 1.02 MG/DL (ref 0.76–1.27)
EGFR: 76 ML/MIN/1.73
GLOBULIN SER-MCNC: 2.3 G/DL (ref 1.5–4.5)
GLUCOSE SERPL-MCNC: 103 MG/DL (ref 70–99)
HDLC SERPL-MCNC: 44 MG/DL
LDLC SERPL CALC-MCNC: 93 MG/DL (ref 0–99)
MICRODELETION SYND BLD/T FISH: NORMAL
POTASSIUM SERPL-SCNC: 4.3 MMOL/L (ref 3.5–5.2)
PROT SERPL-MCNC: 6.6 G/DL (ref 6–8.5)
SODIUM SERPL-SCNC: 139 MMOL/L (ref 134–144)
TRIGL SERPL-MCNC: 157 MG/DL (ref 0–149)

## 2022-12-28 ENCOUNTER — HOSPITAL ENCOUNTER (OUTPATIENT)
Dept: RADIOLOGY | Facility: HOSPITAL | Age: 77
Discharge: HOME | End: 2022-12-28
Attending: FAMILY MEDICINE

## 2022-12-28 DIAGNOSIS — I71.43 INFRARENAL ABDOMINAL AORTIC ANEURYSM (AAA) WITHOUT RUPTURE: ICD-10-CM

## 2023-01-06 ENCOUNTER — ESTABLISHED COMPREHENSIVE EXAM (OUTPATIENT)
Dept: URBAN - METROPOLITAN AREA CLINIC 6 | Facility: CLINIC | Age: 78
End: 2023-01-06

## 2023-01-06 DIAGNOSIS — H25.813: ICD-10-CM

## 2023-01-06 DIAGNOSIS — H43.812: ICD-10-CM

## 2023-01-06 DIAGNOSIS — H35.363: ICD-10-CM

## 2023-01-06 DIAGNOSIS — H47.321: ICD-10-CM

## 2023-01-06 PROCEDURE — 92134 CPTRZ OPH DX IMG PST SGM RTA: CPT

## 2023-01-06 PROCEDURE — 92014 COMPRE OPH EXAM EST PT 1/>: CPT

## 2023-01-06 ASSESSMENT — VISUAL ACUITY
OS_SC: 20/25
OD_SC: 20/20

## 2023-01-06 ASSESSMENT — TONOMETRY
OS_IOP_MMHG: 8
OD_IOP_MMHG: 8

## 2023-06-23 DIAGNOSIS — M15.9 PRIMARY OSTEOARTHRITIS INVOLVING MULTIPLE JOINTS: ICD-10-CM

## 2023-06-23 NOTE — TELEPHONE ENCOUNTER
Requested medication(s) are due for refill today: Yes  Patient has already received a courtesy refill: No  Other reason request has been forwarded to provider:  failed protocol

## 2023-06-24 RX ORDER — MELOXICAM 15 MG/1
TABLET ORAL
Qty: 90 TABLET | Refills: 0 | Status: SHIPPED | OUTPATIENT
Start: 2023-06-24

## 2023-07-03 ENCOUNTER — TELEPHONE (OUTPATIENT)
Dept: FAMILY MEDICINE CLINIC | Facility: CLINIC | Age: 78
End: 2023-07-03

## 2023-07-03 NOTE — TELEPHONE ENCOUNTER
Patient left a voicemail requesting a refill on selenium sulfide (SELSUN) 2.5 % shampoo. He would like this sent to the [facility] in [place].  This is not on the patients current medication list.     Lizette Quezada LPN

## 2023-07-04 DIAGNOSIS — B35.6 TINEA CRURIS: Primary | ICD-10-CM

## 2023-07-04 RX ORDER — LIDOCAINE 36 MG/1
PATCH TOPICAL
COMMUNITY
Start: 2023-06-23

## 2023-07-04 RX ORDER — DICLOFENAC SODIUM 30 MG/G
GEL TOPICAL
COMMUNITY
Start: 2023-06-23

## 2023-07-12 ENCOUNTER — TELEPHONE (OUTPATIENT)
Dept: FAMILY MEDICINE CLINIC | Facility: CLINIC | Age: 78
End: 2023-07-12

## 2023-07-13 ENCOUNTER — TELEPHONE (OUTPATIENT)
Dept: FAMILY MEDICINE CLINIC | Facility: CLINIC | Age: 78
End: 2023-07-13

## 2023-07-13 DIAGNOSIS — B35.6 TINEA CRURIS: Primary | ICD-10-CM

## 2023-07-13 RX ORDER — CLOTRIMAZOLE AND BETAMETHASONE DIPROPIONATE 10; .64 MG/G; MG/G
CREAM TOPICAL 2 TIMES DAILY
Qty: 45 G | Refills: 1 | Status: SHIPPED | OUTPATIENT
Start: 2023-07-13

## 2023-07-13 NOTE — TELEPHONE ENCOUNTER
Larry Bustos left a message, stating that he has tried for 2 weeks now to get his fungal cream and has not heard back from anyone.  We need to call him to let him know Dr Star Bustamante sent it    July 13, 2023  Sally Gómez,          7/13/23 12:38 AM  Note      Cream sent  If looking for something else, then appt needed        [facility]

## 2023-07-29 ENCOUNTER — HOSPITAL ENCOUNTER (OUTPATIENT)
Dept: RADIOLOGY | Facility: HOSPITAL | Age: 78
Discharge: HOME | End: 2023-07-29
Payer: COMMERCIAL

## 2023-07-29 DIAGNOSIS — M25.562 ARTHRALGIA OF BOTH LOWER LEGS: ICD-10-CM

## 2023-07-29 DIAGNOSIS — M25.512 PAIN OF BOTH SHOULDER JOINTS: ICD-10-CM

## 2023-07-29 DIAGNOSIS — M25.511 PAIN OF BOTH SHOULDER JOINTS: ICD-10-CM

## 2023-07-29 DIAGNOSIS — M25.561 ARTHRALGIA OF BOTH LOWER LEGS: ICD-10-CM

## 2023-07-29 DIAGNOSIS — M54.2 NECK PAIN: ICD-10-CM

## 2023-07-29 DIAGNOSIS — M54.50 LOW BACK PAIN, UNSPECIFIED BACK PAIN LATERALITY, UNSPECIFIED CHRONICITY, UNSPECIFIED WHETHER SCIATICA PRESENT: ICD-10-CM

## 2023-07-29 PROCEDURE — 73562 X-RAY EXAM OF KNEE 3: CPT

## 2023-07-29 PROCEDURE — 72100 X-RAY EXAM L-S SPINE 2/3 VWS: CPT

## 2023-07-29 PROCEDURE — 73030 X-RAY EXAM OF SHOULDER: CPT

## 2023-07-29 PROCEDURE — 72040 X-RAY EXAM NECK SPINE 2-3 VW: CPT

## 2023-07-29 RX ORDER — SELENIUM SULFIDE 2.5 MG/100ML
LOTION TOPICAL
Qty: 180 ML | Refills: 1 | Status: SHIPPED | OUTPATIENT
Start: 2023-07-29

## 2023-08-27 PROBLEM — Z01.818 PRE-OP EXAMINATION: Status: RESOLVED | Noted: 2018-02-16 | Resolved: 2023-08-27

## 2023-08-27 PROBLEM — U07.1 COVID-19: Status: RESOLVED | Noted: 2021-12-13 | Resolved: 2023-08-27

## 2023-08-27 PROBLEM — R53.83 OTHER FATIGUE: Status: RESOLVED | Noted: 2021-12-22 | Resolved: 2023-08-27

## 2023-08-27 PROBLEM — K43.9 VENTRAL HERNIA: Status: RESOLVED | Noted: 2018-01-03 | Resolved: 2023-08-27

## 2023-08-30 ENCOUNTER — OFFICE VISIT (OUTPATIENT)
Dept: FAMILY MEDICINE CLINIC | Facility: CLINIC | Age: 78
End: 2023-08-30
Payer: COMMERCIAL

## 2023-08-30 VITALS
WEIGHT: 234.4 LBS | RESPIRATION RATE: 18 BRPM | TEMPERATURE: 97.7 F | BODY MASS INDEX: 34.72 KG/M2 | OXYGEN SATURATION: 95 % | HEART RATE: 80 BPM | DIASTOLIC BLOOD PRESSURE: 80 MMHG | SYSTOLIC BLOOD PRESSURE: 132 MMHG | HEIGHT: 69 IN

## 2023-08-30 DIAGNOSIS — Z13.1 SCREENING FOR DIABETES MELLITUS (DM): ICD-10-CM

## 2023-08-30 DIAGNOSIS — E66.9 OBESITY (BMI 30-39.9): ICD-10-CM

## 2023-08-30 DIAGNOSIS — G47.33 OBSTRUCTIVE SLEEP APNEA: ICD-10-CM

## 2023-08-30 DIAGNOSIS — I71.43 INFRARENAL ABDOMINAL AORTIC ANEURYSM (AAA) WITHOUT RUPTURE (HCC): Primary | ICD-10-CM

## 2023-08-30 DIAGNOSIS — R73.03 PREDIABETES: ICD-10-CM

## 2023-08-30 DIAGNOSIS — Z13.83 SCREENING FOR CARDIOVASCULAR, RESPIRATORY, AND GENITOURINARY DISEASES: ICD-10-CM

## 2023-08-30 DIAGNOSIS — M15.9 PRIMARY OSTEOARTHRITIS INVOLVING MULTIPLE JOINTS: ICD-10-CM

## 2023-08-30 DIAGNOSIS — Z13.6 SCREENING FOR CARDIOVASCULAR, RESPIRATORY, AND GENITOURINARY DISEASES: ICD-10-CM

## 2023-08-30 DIAGNOSIS — Z13.89 SCREENING FOR CARDIOVASCULAR, RESPIRATORY, AND GENITOURINARY DISEASES: ICD-10-CM

## 2023-08-30 PROCEDURE — 99214 OFFICE O/P EST MOD 30 MIN: CPT | Performed by: FAMILY MEDICINE

## 2023-08-30 RX ORDER — MELOXICAM 15 MG/1
15 TABLET ORAL DAILY PRN
Qty: 90 TABLET | Refills: 1 | Status: SHIPPED | OUTPATIENT
Start: 2023-08-30

## 2023-08-30 NOTE — PROGRESS NOTES
Assessment/Plan:    No problem-specific Assessment & Plan notes found for this encounter. Obesity  Risks aware  Wt loss suggested  htn in past    AAA  Yearly US advised  Monitor for any abdominal pain  Advised vascular surgeon if has criteria of size/change    Osteoarthritis stable  mobic helps  nsaids risks advised  May consider ortho if worse in future    Prediabetes advised  Low carb diet and wt loss suggested    CATHY, continue tx, f/u pulm     Diagnoses and all orders for this visit:    Infrarenal abdominal aortic aneurysm (AAA) without rupture ([address])  -     US abdominal aorta; Future    Screening for cardiovascular, respiratory, and genitourinary diseases  -     Lipid Panel with Direct LDL reflex; Future    Screening for diabetes mellitus (DM)  -     Comprehensive metabolic panel; Future    Prediabetes  -     Hemoglobin A1C; Future    Obstructive sleep apnea    Primary osteoarthritis involving multiple joints  -     meloxicam (MOBIC) 15 mg tablet; Take 1 tablet (15 mg total) by mouth daily as needed for moderate pain    Obesity (BMI 30-39. 9)        Return in about 6 months (around 2/29/2024) for Recheck. Subjective:      Patient ID: Arianna Rodriguez is a 68 y.o. male. Chief Complaint   Patient presents with   • Follow-up     6 month follow up  NILSA Wang/KONSTANTIN       HPI  Wt is up  [address] last pm  Exercise 0  Can do better with diet, wife notes wt gain    The following portions of the patient's history were reviewed and updated as appropriate: allergies, current medications, past family history, past medical history, past social history, past surgical history and problem list.    Review of Systems   Constitutional: Negative for chills and fever.          Current Outpatient Medications   Medication Sig Dispense Refill   • Diclofenac Sodium 3 % GEL      • meloxicam (MOBIC) 15 mg tablet Take 1 tablet (15 mg total) by mouth daily as needed for moderate pain 90 tablet 1   • selenium sulfide (SELSUN) 2.5 % shampoo Lather whole body for 10 minutes daily for 7 days as needed 180 mL 1   • ZTlido 1.8 % PTCH      • clotrimazole-betamethasone (LOTRISONE) 1-0.05 % cream Apply topically 2 (two) times a day Short term, sparingly to area: groin (Patient not taking: Reported on 8/30/2023) 45 g 1     No current facility-administered medications for this visit. Objective:    /80   Pulse 80   Temp 97.7 °F (36.5 °C) (Temporal)   Resp 18   Ht 5' 9" (1.753 m)   Wt 106 kg (234 lb 6.4 oz)   SpO2 95%   BMI 34.61 kg/m²        Physical Exam  Vitals and nursing note reviewed. Constitutional:       Appearance: He is well-developed. He is obese. He is not ill-appearing. HENT:      Head: Normocephalic. Right Ear: Tympanic membrane normal.      Left Ear: Tympanic membrane normal.   Eyes:      General: No scleral icterus. Conjunctiva/sclera: Conjunctivae normal.   Cardiovascular:      Rate and Rhythm: Normal rate and regular rhythm. Heart sounds: No murmur heard. Pulmonary:      Effort: Pulmonary effort is normal. No respiratory distress. Breath sounds: No wheezing or rales. Abdominal:      Palpations: Abdomen is soft. Tenderness: There is no abdominal tenderness. Musculoskeletal:         General: No deformity. Cervical back: Neck supple. Right lower leg: No edema. Left lower leg: No edema. Skin:     General: Skin is warm and dry. Coloration: Skin is not pale. Neurological:      Mental Status: He is alert. Motor: No weakness. Gait: Gait normal.   Psychiatric:         Mood and Affect: Mood normal.         Behavior: Behavior normal.         Thought Content: Thought content normal.         BMI Counseling: Body mass index is 34.61 kg/m². The BMI is above normal. Nutrition recommendations include decreasing portion sizes and moderation in carbohydrate intake. Exercise recommendations include exercising 3-5 times per week. No pharmacotherapy was ordered.  Rationale for BMI follow-up plan is due to patient being overweight or obese. Depression Screening and Follow-up Plan: Patient was screened for depression during today's encounter. They screened negative with a PHQ-2 score of 0.            Elvis Oglesyb DO

## 2023-09-02 LAB
ALBUMIN SERPL-MCNC: 4 G/DL (ref 3.8–4.8)
ALBUMIN/GLOB SERPL: 1.6 {RATIO} (ref 1.2–2.2)
ALP SERPL-CCNC: 72 IU/L (ref 44–121)
ALT SERPL-CCNC: 12 IU/L (ref 0–44)
AST SERPL-CCNC: 17 IU/L (ref 0–40)
BILIRUB SERPL-MCNC: 0.7 MG/DL (ref 0–1.2)
BUN SERPL-MCNC: 15 MG/DL (ref 8–27)
BUN/CREAT SERPL: 16 (ref 10–24)
CALCIUM SERPL-MCNC: 9.1 MG/DL (ref 8.6–10.2)
CHLORIDE SERPL-SCNC: 104 MMOL/L (ref 96–106)
CHOLEST SERPL-MCNC: 140 MG/DL (ref 100–199)
CO2 SERPL-SCNC: 22 MMOL/L (ref 20–29)
CREAT SERPL-MCNC: 0.96 MG/DL (ref 0.76–1.27)
EGFR: 81 ML/MIN/1.73
GLOBULIN SER-MCNC: 2.5 G/DL (ref 1.5–4.5)
GLUCOSE SERPL-MCNC: 95 MG/DL (ref 70–99)
HBA1C MFR BLD: 5.5 % (ref 4.8–5.6)
HDLC SERPL-MCNC: 41 MG/DL
LDLC SERPL CALC-MCNC: 79 MG/DL (ref 0–99)
MICRODELETION SYND BLD/T FISH: NORMAL
POTASSIUM SERPL-SCNC: 4.5 MMOL/L (ref 3.5–5.2)
PROT SERPL-MCNC: 6.5 G/DL (ref 6–8.5)
SODIUM SERPL-SCNC: 142 MMOL/L (ref 134–144)
TRIGL SERPL-MCNC: 106 MG/DL (ref 0–149)

## 2023-12-28 ENCOUNTER — HOSPITAL ENCOUNTER (OUTPATIENT)
Dept: RADIOLOGY | Facility: HOSPITAL | Age: 78
Discharge: HOME | End: 2023-12-28
Attending: FAMILY MEDICINE
Payer: COMMERCIAL

## 2023-12-28 DIAGNOSIS — I71.43 INFRARENAL ABDOMINAL AORTIC ANEURYSM (AAA) WITHOUT RUPTURE (HCC): ICD-10-CM

## 2023-12-28 PROCEDURE — 76775 US EXAM ABDO BACK WALL LIM: CPT

## 2024-01-09 ENCOUNTER — ESTABLISHED COMPREHENSIVE EXAM (OUTPATIENT)
Dept: URBAN - METROPOLITAN AREA CLINIC 6 | Facility: CLINIC | Age: 79
End: 2024-01-09

## 2024-01-09 DIAGNOSIS — H25.813: ICD-10-CM

## 2024-01-09 DIAGNOSIS — H35.363: ICD-10-CM

## 2024-01-09 PROCEDURE — 92014 COMPRE OPH EXAM EST PT 1/>: CPT

## 2024-01-09 PROCEDURE — 92134 CPTRZ OPH DX IMG PST SGM RTA: CPT

## 2024-01-09 ASSESSMENT — TONOMETRY
OS_IOP_MMHG: 9
OD_IOP_MMHG: 8

## 2024-01-09 ASSESSMENT — VISUAL ACUITY
OS_SC: 20/30
OS_SC: J2
OD_SC: J2
OD_SC: 20/20-2

## 2024-01-29 ENCOUNTER — OFFICE VISIT (OUTPATIENT)
Dept: FAMILY MEDICINE CLINIC | Facility: CLINIC | Age: 79
End: 2024-01-29
Payer: COMMERCIAL

## 2024-01-29 VITALS
DIASTOLIC BLOOD PRESSURE: 78 MMHG | TEMPERATURE: 97.3 F | RESPIRATION RATE: 18 BRPM | WEIGHT: 238.6 LBS | HEART RATE: 80 BPM | BODY MASS INDEX: 35.24 KG/M2 | SYSTOLIC BLOOD PRESSURE: 120 MMHG

## 2024-01-29 DIAGNOSIS — J20.9 ACUTE BRONCHITIS, UNSPECIFIED ORGANISM: Primary | ICD-10-CM

## 2024-01-29 LAB
SL AMB POCT RAPID FLU A: NEGATIVE
SL AMB POCT RAPID FLU B: NEGATIVE

## 2024-01-29 PROCEDURE — 99213 OFFICE O/P EST LOW 20 MIN: CPT | Performed by: NURSE PRACTITIONER

## 2024-01-29 PROCEDURE — 1160F RVW MEDS BY RX/DR IN RCRD: CPT | Performed by: NURSE PRACTITIONER

## 2024-01-29 PROCEDURE — 87804 INFLUENZA ASSAY W/OPTIC: CPT | Performed by: NURSE PRACTITIONER

## 2024-01-29 PROCEDURE — 1159F MED LIST DOCD IN RCRD: CPT | Performed by: NURSE PRACTITIONER

## 2024-01-29 RX ORDER — AZITHROMYCIN 250 MG/1
TABLET, FILM COATED ORAL
Qty: 6 TABLET | Refills: 0 | Status: SHIPPED | OUTPATIENT
Start: 2024-01-29 | End: 2024-02-03

## 2024-01-29 RX ORDER — METHYLPREDNISOLONE 4 MG/1
TABLET ORAL
Qty: 21 EACH | Refills: 0 | Status: SHIPPED | OUTPATIENT
Start: 2024-01-29

## 2024-01-29 NOTE — PATIENT INSTRUCTIONS
Azithromycin (By mouth)   Azithromycin (tq-[id number]-[id number]-sin)  Treats infections. This medicine is a macrolide antibiotic.   Brand Name(s): Zithromax, Zithromax Tri-Albaro, Zithromax Z-Albaro, Zmax   There may be other brand names for this medicine.  When This Medicine Should Not Be Used:   This medicine is not right for everyone. Do not use it if you had an allergic reaction to azithromycin, erythromycin, or similar medicines, or if you have a history of liver problems caused by azithromycin.  How to Use This Medicine:   Capsule, Liquid, Packet, Powder, Tablet  Your doctor will tell you how much medicine to use. Do not use more than directed.  Take all of the medicine in your prescription to clear up your infection, even if you feel better after the first few doses.  Multiple dose (Zithromax® oral liquid or tablets):   You may take this medicine with or without food.  Shake the bottle well before you measure the medicine. Measure the oral liquid medicine with a marked measuring spoon, oral syringe, or medicine cup.  Single dose (Zmax® extended-release oral liquid or powder):   Liquid:   Take this medicine on an empty stomach at least 1 hour before you eat, or 2 hours after you eat.  Call your doctor right away if you vomit within 1 hour after you take the medicine.  You must take the liquid within 12 hours after the pharmacist gives it to you.  Shake the bottle well before you measure the medicine. Measure your dose with a marked measuring spoon, cup, or syringe.  Powder:   Open 1 packet and pour all of the medicine into a glass with about 2 ounces (¼ cup) of water. Mix well and drink the medicine right away. Pour another 2 ounces of water into the same glass, and drink the remaining medicine.  Read and follow the patient instructions that come with this medicine. Talk to your doctor or pharmacist if you have any questions.  Missed dose: If you are taking multiple doses, take the dose as soon as you remember. If it is  almost time for your next dose, wait until then to take a regular dose. Do not use extra medicine to make up for a missed dose.  Store the medicine in a closed container at room temperature, away from heat, moisture, and direct light.  Extended-release oral liquid: Do not refrigerate or freeze.  Oral liquid for 1 dose only: Store at room temperature. Do not store in the refrigerator or allow the medicine to freeze.  Oral liquid for multiple doses: Store at room temperature or in the refrigerator. Use it within 10 days of filling the prescription.  Drugs and Foods to Avoid:   Ask your doctor or pharmacist before using any other medicine, including over-the-counter medicines, vitamins, and herbal products.  Some medicines can affect how azithromycin works. Tell your doctor if you are also using any of the following:  Colchicine, cyclosporine, digoxin, nelfinavir, phenytoin  Blood thinner (including warfarin)  Ergot medicine  Medicine for a heart rhythm problem (including amiodarone, dofetilide, procainamide, quinidine, sotalol)  Zithromax® for multiple doses: Do not take an antacid that contains magnesium or aluminum at the same time you take Zithromax®. An antacid will affect how the medicine works. Antacids will not affect Zmax® for single dose.  Warnings While Using This Medicine:   Tell your doctor if you are pregnant or breastfeeding, or if you have kidney disease, liver disease, heart disease, heart rhythm problems, heart failure, or myasthenia gravis. Tell your doctor if anyone in your family has heart rhythm problems.  This medicine may cause the following problems:   Serious skin reactions, including Weston-Sebastian syndrome, acute generalized exanthematous pustulosis, toxic epidermal necrolysis, and drug reaction with eosinophilia and systemic symptoms (DRESS)  Liver problems  Infantile hypertrophic pyloric stenosis  Heart rhythm problems, including QT prolongation  Increased risk of serious heart or blood  vessel problems  This medicine can cause diarrhea. Call your doctor if the diarrhea becomes severe, does not stop, or is bloody. Do not take any medicine to stop diarrhea until you have talked to your doctor. Diarrhea can occur 2 months or more after you stop taking this medicine. It may occur 2 months or more after you stop using this medicine.  Call your doctor if your symptoms do not improve or if they get worse.  Your doctor will do lab tests at regular visits to check on the effects of this medicine. Keep all appointments.  Keep all medicine out of the reach of children. Never share your medicine with anyone.  Possible Side Effects While Using This Medicine:   Call your doctor right away if you notice any of these side effects:  Allergic reaction: Itching or hives, swelling in your face or hands, swelling or tingling in your mouth or throat, chest tightness, trouble breathing  Blistering, peeling, red skin rash  Dark urine, pale stools, nausea, vomiting, loss of appetite, stomach pain, yellow skin or eyes  Fainting, dizziness, lightheadedness  Fast, pounding, or uneven heartbeat, blurred vision, chest pain, trouble breathing, tiredness or weakness  Feeling irritable or vomits after feeding (in babies)  Severe diarrhea that may contain blood, stomach cramps, fever  If you notice these less serious side effects, talk with your doctor:   Mild diarrhea, nausea, vomiting, stomach pain  If you notice other side effects that you think are caused by this medicine, tell your doctor.   Call your doctor for medical advice about side effects. You may report side effects to FDA at 4-069-FDA-1692  © Copyright [employer] 2023 Information is for End User's use only and may not be sold, redistributed or otherwise used for commercial purposes.  The above information is an  only. It is not intended as medical advice for individual conditions or treatments. Talk to your doctor, nurse or pharmacist before following any  medical regimen to see if it is safe and effective for you.  Methylprednisolone (By mouth)   Methylprednisolone (meth-il-pred-NIS-oh-lone)  Treats inflammation, severe allergies, flare-ups of ongoing illnesses, and many other medical problems. May also be used to decrease some symptoms of cancer. This medicine is a corticosteroid.   Brand Name(s): Medrol, Medrol Dosepak, Methylpred-DP   There may be other brand names for this medicine.  When This Medicine Should Not Be Used:   This medicine is not right for everyone. Do not use it if you had an allergic reaction to methylprednisolone, or if you have a fungus infection.  How to Use This Medicine:   Tablet  Take your medicine as directed. Your dose may need to be changed several times to find what works best for you.  If you are using this medicine for an ongoing illness, your dose may need to be changed occasionally. Some people take this medicine only every other day, which helps to decrease side effects.  Take your medicine in the morning, unless your doctor tells you otherwise.  It is best to take this medicine with food or milk.  Missed dose: Take a dose as soon as you remember. If it is almost time for your next dose, wait until then and take a regular dose. Do not take extra medicine to make up for a missed dose.  Store the medicine in a closed container at room temperature, away from heat, moisture, and direct light.  Drugs and Foods to Avoid:   Ask your doctor or pharmacist before using any other medicine, including over-the-counter medicines, vitamins, and herbal products.  Some medicines can affect how methylprednisolone works. Tell your doctor if you are using any of the following:  Cyclosporine, ketoconazole, phenobarbital, phenytoin, rifampin, troleandomycin  Aspirin, especially in high doses  Blood thinner (including warfarin)  Diabetes medicine  This medicine may interfere with vaccines. Ask your doctor before you get a flu shot or any other  vaccines.  Warnings While Using This Medicine:   Tell your doctor if you are pregnant or breastfeeding, or if you have kidney disease, liver disease (including cirrhosis), adrenal gland problems, heart failure, high blood pressure, diabetes, osteoporosis, blood clotting problems, thyroid problems, mental health problems (including depression), myasthenia gravis, or stomach or bowel problems (including ulcer or diverticulitis). Tell your doctor if you have an infection (including herpes eye infection, tuberculosis, or threadworm). Also tell your doctor if you have a recent exposure to chickenpox or measles.  This medicine may cause the following problems:  Increased risk of infection  Changes in mood or behavior  High blood pressure  Adrenal gland problems  Eye problems or changes in vision (including cataracts or glaucoma)  Bone problems (including osteoporosis)  Slow growth in children  Increased risk for cancer (including Kaposi's sarcoma)  If you use this medicine for a long time, tell your doctor about any extra stress or anxiety in your life, including other health concerns and emotional stress. Your dose might need to be changed for a short time while you have extra stress.  Do not stop using this medicine suddenly. Your doctor will need to slowly decrease your dose before you stop it completely.  Tell any doctor or dentist who treats you that you are using this medicine. This medicine may affect certain medical test results.  Your doctor will do lab tests at regular visits to check on the effects of this medicine. Keep all appointments.  Keep all medicine out of the reach of children. Never share your medicine with anyone.  Possible Side Effects While Using This Medicine:   Call your doctor right away if you notice any of these side effects:  Allergic reaction: Itching or hives, swelling in your face or hands, swelling or tingling in your mouth or throat, chest tightness, trouble breathing  Bone pain,  decrease in height  Dark freckles, skin color changes, coldness, weakness, tiredness, weight loss  Depression, unusual thoughts, feelings, or behaviors, trouble sleeping  Fever, chills, cough, sore throat, body aches  Severe stomach pain, nausea, vomiting, or red or black stools  Skin changes or growths  Swelling in your hands, ankles, or feet, rapid weight gain  Trouble seeing, blurred vision or other changes in vision, eye pain, headache  Unusual bleeding or bruising  If you notice these less serious side effects, talk with your doctor:   Increased appetite  Round, puffy face  Weight gain around your neck, upper back, breast, face, or waist  If you notice other side effects that you think are caused by this medicine, tell your doctor.   Call your doctor for medical advice about side effects. You may report side effects to FDA at 7-230-FDA-8599  © Copyright [employer]ative 2023 Information is for End User's use only and may not be sold, redistributed or otherwise used for commercial purposes.  The above information is an  only. It is not intended as medical advice for individual conditions or treatments. Talk to your doctor, nurse or pharmacist before following any medical regimen to see if it is safe and effective for you.

## 2024-01-29 NOTE — PROGRESS NOTES
"Assessment/Plan:    1. Acute bronchitis, unspecified organism  -     POCT rapid flu A and B  -     azithromycin (ZITHROMAX) 250 mg tablet; Take 500mg on day 1, 250mg on days 2-5  -     methylPREDNISolone 4 MG tablet therapy pack; Use as directed on package          Patient Instructions:  Take medication with food.  It is important that you take the entire course of antibiotics prescribed.  May also take a probiotic of your choice to maintain healthy GI abran.    Can take some probiotic and yogurt with the medication.  Take prednisone with food in morning and do not take any NSAID's while taking prednisone.  Supportive care discussed and advised.  Advised to RTO for any worsening and no improvement.   Follow up for no improvement and worsening of conditions.  Patient advised and educated when to see immediate medical care.    Return if symptoms worsen or fail to improve.      Future Appointments   Date Time Provider Department Center   3/1/2024 10:30 AM DO KERRIE Bajwa [facility]           Subjective:      Patient ID: Carson Whitt is a 78 y.o. male.    Chief Complaint   Patient presents with   • sick visit     Chills, body aches, chest congestion and  \"light fever\" started a few days ago.      CMA          Vitals:  /78   Pulse 80   Temp (!) 97.3 °F (36.3 °C)   Resp 18   Wt 108 kg (238 lb 9.6 oz)   BMI 35.24 kg/m²     HPI  Patient started with bodyaches, chest congestion and chills couple of days ago more than 5 days and progressed to wheezing. Denies any fever, and sob currently      The following portions of the patient's history were reviewed and updated as appropriate: allergies, current medications, past family history, past medical history, past social history, past surgical history and problem list.      Review of Systems   Constitutional:  Negative for chills, diaphoresis, fatigue, fever and unexpected weight change.   HENT:  Positive for congestion. Negative for dental problem, " drooling, ear discharge, ear pain, facial swelling, hearing loss, mouth sores, nosebleeds, postnasal drip, rhinorrhea, sinus pressure, sinus pain, sneezing, sore throat, tinnitus, trouble swallowing and voice change.    Respiratory:  Positive for cough and wheezing. Negative for chest tightness and shortness of breath.    Cardiovascular: Negative.    Gastrointestinal:  Negative for abdominal pain, constipation, diarrhea, nausea and vomiting.   Musculoskeletal: Negative.    Skin: Negative.    Neurological:  Negative for dizziness, light-headedness and headaches.         Objective:    Social History     Tobacco Use   Smoking Status Never   Smokeless Tobacco Never       Allergies:   Allergies   Allergen Reactions   • Sulfa Antibiotics Hives     Reaction Date: 14Jan2005;          Current Outpatient Medications   Medication Sig Dispense Refill   • azithromycin (ZITHROMAX) 250 mg tablet Take 500mg on day 1, 250mg on days 2-5 6 tablet 0   • meloxicam (MOBIC) 15 mg tablet Take 1 tablet (15 mg total) by mouth daily as needed for moderate pain 90 tablet 1   • methylPREDNISolone 4 MG tablet therapy pack Use as directed on package 21 each 0   • clotrimazole-betamethasone (LOTRISONE) 1-0.05 % cream Apply topically 2 (two) times a day Short term, sparingly to area: groin (Patient not taking: Reported on 8/30/2023) 45 g 1   • Diclofenac Sodium 3 % GEL      • selenium sulfide (SELSUN) 2.5 % shampoo Lather whole body for 10 minutes daily for 7 days as needed (Patient not taking: Reported on 1/29/2024) 180 mL 1   • ZTlido 1.8 % PTCH        No current facility-administered medications for this visit.          Physical Exam  Vitals reviewed.   Constitutional:       Appearance: Normal appearance. He is well-developed.   HENT:      Head: Normocephalic.      Right Ear: Tympanic membrane, ear canal and external ear normal.      Left Ear: Tympanic membrane, ear canal and external ear normal.      Nose: Nose normal.      Right Sinus: No  maxillary sinus tenderness or frontal sinus tenderness.      Left Sinus: No maxillary sinus tenderness or frontal sinus tenderness.      Mouth/Throat:      Mouth: No oral lesions.      Pharynx: No oropharyngeal exudate or posterior oropharyngeal erythema.   Cardiovascular:      Rate and Rhythm: Normal rate and regular rhythm.      Heart sounds: Normal heart sounds.   Pulmonary:      Effort: Pulmonary effort is normal.      Breath sounds: Wheezing present.   Musculoskeletal:         General: Normal range of motion.      Cervical back: Neck supple.   Lymphadenopathy:      Cervical:      Right cervical: No superficial or posterior cervical adenopathy.     Left cervical: No superficial or posterior cervical adenopathy.   Skin:     General: Skin is warm and dry.   Neurological:      Mental Status: He is alert and oriented to person, place, and time.   Psychiatric:         Behavior: Behavior normal.         Thought Content: Thought content normal.         Judgment: Judgment normal.                     JHONATAN Christopher

## 2024-02-21 PROBLEM — Z13.6 SCREENING FOR CARDIOVASCULAR, RESPIRATORY, AND GENITOURINARY DISEASES: Status: RESOLVED | Noted: 2019-03-13 | Resolved: 2024-02-21

## 2024-02-21 PROBLEM — Z00.00 HEALTHCARE MAINTENANCE: Status: RESOLVED | Noted: 2018-02-15 | Resolved: 2024-02-21

## 2024-02-21 PROBLEM — Z13.83 SCREENING FOR CARDIOVASCULAR, RESPIRATORY, AND GENITOURINARY DISEASES: Status: RESOLVED | Noted: 2019-03-13 | Resolved: 2024-02-21

## 2024-02-21 PROBLEM — Z00.00 MEDICARE ANNUAL WELLNESS VISIT, SUBSEQUENT: Status: RESOLVED | Noted: 2019-03-13 | Resolved: 2024-02-21

## 2024-02-21 PROBLEM — Z13.1 SCREENING FOR DIABETES MELLITUS (DM): Status: RESOLVED | Noted: 2019-03-13 | Resolved: 2024-02-21

## 2024-02-21 PROBLEM — Z13.89 SCREENING FOR CARDIOVASCULAR, RESPIRATORY, AND GENITOURINARY DISEASES: Status: RESOLVED | Noted: 2019-03-13 | Resolved: 2024-02-21

## 2024-03-11 ENCOUNTER — OFFICE VISIT (OUTPATIENT)
Dept: FAMILY MEDICINE CLINIC | Facility: CLINIC | Age: 79
End: 2024-03-11
Payer: COMMERCIAL

## 2024-03-11 VITALS
HEART RATE: 80 BPM | RESPIRATION RATE: 18 BRPM | DIASTOLIC BLOOD PRESSURE: 90 MMHG | WEIGHT: 241 LBS | BODY MASS INDEX: 35.59 KG/M2 | TEMPERATURE: 99.7 F | SYSTOLIC BLOOD PRESSURE: 142 MMHG

## 2024-03-11 DIAGNOSIS — I71.43 INFRARENAL ABDOMINAL AORTIC ANEURYSM (AAA) WITHOUT RUPTURE (HCC): ICD-10-CM

## 2024-03-11 DIAGNOSIS — Z91.89 FRAMINGHAM CARDIAC RISK >20% IN NEXT 10 YEARS: Primary | ICD-10-CM

## 2024-03-11 DIAGNOSIS — R03.0 ELEVATED BP WITHOUT DIAGNOSIS OF HYPERTENSION: ICD-10-CM

## 2024-03-11 DIAGNOSIS — E66.9 OBESITY (BMI 30-39.9): ICD-10-CM

## 2024-03-11 DIAGNOSIS — M17.11 PRIMARY LOCALIZED OSTEOARTHRITIS OF RIGHT KNEE: ICD-10-CM

## 2024-03-11 PROBLEM — E66.01 OBESITY, MORBID (HCC): Status: RESOLVED | Noted: 2024-03-11 | Resolved: 2024-03-11

## 2024-03-11 PROBLEM — E66.01 OBESITY, MORBID (HCC): Status: ACTIVE | Noted: 2024-03-11

## 2024-03-11 PROBLEM — R73.03 PREDIABETES: Status: RESOLVED | Noted: 2023-08-30 | Resolved: 2024-03-11

## 2024-03-11 PROCEDURE — 99215 OFFICE O/P EST HI 40 MIN: CPT | Performed by: FAMILY MEDICINE

## 2024-03-11 NOTE — PROGRESS NOTES
Assessment/Plan:    No problem-specific Assessment & Plan notes found for this encounter.    Fram score elevated, he will consider statin after risks/benefits advised    Elevated bp  TLC advised  F/u 1-3m for recheck and dx  Hx of ramipril in past but lost wt so was able to stop    Right knee DJD  Continue mobic  Can also take tylenol  Xrays reviewed with pt and wife  Ortho when ready for possible CSI or TKR    BMI reviewed in detail  Asked him to lost 10% of current wt    AAA monitoring aware    Discussed all of the above with pt and wife and all questions were answered     Diagnoses and all orders for this visit:    Lucas cardiac risk >20% in next 10 years    Elevated BP without diagnosis of hypertension    Obesity (BMI 30-39.9)  -     Ambulatory referral to Weight Management; Future    Infrarenal abdominal aortic aneurysm (AAA) without rupture (HCC)    Primary localized osteoarthritis of right knee        Return in about 3 months (around 6/11/2024) for Recheck.    Subjective:      Patient ID: Carson Whitt is a 78 y.o. male.    Chief Complaint   Patient presents with    Follow-up    Aneurysm     Sas/cma       HPI  Has gained wt  Got down to 206 at one time  Right knee pain ongoing, becoming more limiting  Has djd on xr in past, R>L  AAA aware  No abdominal pain    The following portions of the patient's history were reviewed and updated as appropriate: allergies, current medications, past family history, past medical history, past social history, past surgical history and problem list.    Review of Systems   Constitutional:  Positive for unexpected weight change. Negative for fever.   Cardiovascular:  Negative for chest pain.   Gastrointestinal:  Negative for abdominal pain.         Current Outpatient Medications   Medication Sig Dispense Refill    meloxicam (MOBIC) 15 mg tablet Take 1 tablet (15 mg total) by mouth daily as needed for moderate pain 90 tablet 1     No current facility-administered  medications for this visit.       Objective:    /90   Pulse 80   Temp 99.7 °F (37.6 °C)   Resp 18   Wt 109 kg (241 lb)   BMI 35.59 kg/m²        Physical Exam  Vitals and nursing note reviewed.   Constitutional:       General: He is not in acute distress.     Appearance: He is well-developed. He is obese. He is not ill-appearing.   HENT:      Head: Normocephalic.      Right Ear: Tympanic membrane and ear canal normal.      Left Ear: Tympanic membrane and ear canal normal.   Eyes:      General: No scleral icterus.     Conjunctiva/sclera: Conjunctivae normal.   Neck:      Vascular: No carotid bruit.   Cardiovascular:      Rate and Rhythm: Normal rate and regular rhythm.      Heart sounds: No murmur heard.  Pulmonary:      Effort: Pulmonary effort is normal. No respiratory distress.      Breath sounds: No wheezing.   Abdominal:      General: There is no distension.      Palpations: Abdomen is soft.      Tenderness: There is no abdominal tenderness.   Musculoskeletal:         General: No deformity.      Cervical back: Neck supple.      Right lower leg: No edema.      Left lower leg: No edema.   Skin:     General: Skin is warm and dry.      Coloration: Skin is not jaundiced or pale.   Neurological:      Mental Status: He is alert.      Motor: No weakness.      Gait: Gait normal.   Psychiatric:         Mood and Affect: Mood normal.         Behavior: Behavior normal.         Thought Content: Thought content normal.         The 10-year ASCVD risk score (Roman DK, et al., 2019) is: 33.3%    Values used to calculate the score:      Age: 78 years      Sex: Male      Is Non- : No      Diabetic: No      Tobacco smoker: No      Systolic Blood Pressure: 142 mmHg      Is BP treated: No      HDL Cholesterol: 41 mg/dL      Total Cholesterol: 140 mg/dL    41 minutes spent with patient and with chart review and documentation completion.      Lior Rodriguez DO

## 2024-03-11 NOTE — PATIENT INSTRUCTIONS
"Starting cholesterol medications (\"statins\") could be discussed to reduce your 10 year cardiac/stroke risk if you are willing.    Medication such lipitor or crestor could be started if you choose.  The risk score 33%.    When you are ready to see an orthopedic surgeon, let me know.    "

## 2024-03-21 ENCOUNTER — TELEPHONE (OUTPATIENT)
Age: 79
End: 2024-03-21

## 2024-03-21 NOTE — TELEPHONE ENCOUNTER
Sw pt  PT not helping after several months  DJD on prior xrays, multiple areas  Suggest rheumatology vs ortho vs px mgmt  Pt agreeable, he will start with rheum

## 2024-03-21 NOTE — TELEPHONE ENCOUNTER
Patient called in to post converstation with Corinne Snellr /. Patient described his arthritis pain throughout his body;  decreased ROM, ambulation problems. Alis suggested a 3-way conversation with PCP, patient, and herself for 3/27 at 3:30 PM. Patient is going to PT and feels he is not improving. Please follow up with patient to assist in coordinating 3-way conference call.

## 2024-04-10 ENCOUNTER — OFFICE VISIT (OUTPATIENT)
Dept: BARIATRICS | Facility: CLINIC | Age: 79
End: 2024-04-10
Payer: COMMERCIAL

## 2024-04-10 VITALS
DIASTOLIC BLOOD PRESSURE: 80 MMHG | HEIGHT: 67 IN | SYSTOLIC BLOOD PRESSURE: 160 MMHG | HEART RATE: 74 BPM | WEIGHT: 241.8 LBS | BODY MASS INDEX: 37.95 KG/M2

## 2024-04-10 DIAGNOSIS — E66.9 OBESITY, CLASS II, BMI 35-39.9: Primary | ICD-10-CM

## 2024-04-10 DIAGNOSIS — E66.9 OBESITY (BMI 30-39.9): ICD-10-CM

## 2024-04-10 PROBLEM — E66.812 OBESITY, CLASS II, BMI 35-39.9: Status: ACTIVE | Noted: 2018-05-11

## 2024-04-10 PROCEDURE — 99204 OFFICE O/P NEW MOD 45 MIN: CPT | Performed by: INTERNAL MEDICINE

## 2024-04-10 NOTE — ASSESSMENT & PLAN NOTE
-Reviewed diet recall with the patient.  Discussed switching out convenience based processed foods and sweets for healthier options.  Suggested he work with RD to balance his nutrition.  Patient reports that he knows what to do but he is not been able to do it.    -Encouraged increased hydration, encouraged cutting back on soda use  -He also reports that he plans to join the gym on the way back from work and resume working with a  he used to before.  -Recommended patient attempt CPAP; patient reports he is just not able to tolerate it  -Reviewed antiobesity medications at length.  Reviewed low availability of some of the injectable medicines such as Wegovy and Saxenda for starting doses; also discussed Zepbound.  Patient would need to inquire with his insurance regarding coverage.  However patient reports that he wants to not use antiobesity medications at this point  -Given age would not use phentermine or Qsymia-also blood pressure is very elevated today at 160/100.  Patient would need to start blood pressure medicine initiated by primary care.  -Patient has a history of kidney stones and is also a , so Topamax may not be a good option  -Have ordered a full set of labs including a fasting insulin to see if he is a good candidate for metformin  -Patient reports that he is going to get some labs through rheumatology who he plans to see for arthralgias  -Other options are not bupropion and naltrexone.  Would not use any combination meds in the 78-year-old male to be able to better assess for side effects

## 2024-04-10 NOTE — PROGRESS NOTES
Assessment/Plan     Carson Whitt is 78 y.o. year old male  who comes in for consultation for assistance with weight management.     - Discussed options of HealthyCORE-Intensive Lifestyle Intervention Program, Very Low Calorie Diet-VLCD, and Conservative Program and the role of weight loss medications.  - Patient is interested in pursuing Conservative Program    Obesity, Class II, BMI 35-39.9  -Reviewed diet recall with the patient.  Discussed switching out convenience based processed foods and sweets for healthier options.  Suggested he work with RD to balance his nutrition.  Patient reports that he knows what to do but he is not been able to do it.    -Encouraged increased hydration, encouraged cutting back on soda use  -He also reports that he plans to join the gym on the way back from work and resume working with a  he used to before.  -Recommended patient attempt CPAP; patient reports he is just not able to tolerate it  -Reviewed antiobesity medications at length.  Reviewed low availability of some of the injectable medicines such as Wegovy and Saxenda for starting doses; also discussed Zepbound.  Patient would need to inquire with his insurance regarding coverage.  However patient reports that he wants to not use antiobesity medications at this point  -Given age would not use phentermine or Qsymia-also blood pressure is very elevated today at 160/100.  Patient would need to start blood pressure medicine initiated by primary care.  -Patient has a history of kidney stones and is also a , so Topamax may not be a good option  -Have ordered a full set of labs including a fasting insulin to see if he is a good candidate for metformin  -Patient reports that he is going to get some labs through rheumatology who he plans to see for arthralgias  -Other options are not bupropion and naltrexone.  Would not use any combination meds in the 78-year-old male to be able to better  "assess for side effects    Carson was seen today for consult.    Diagnoses and all orders for this visit:    Obesity, Class II, BMI 35-39.9    Obesity (BMI 30-39.9)  -     Ambulatory referral to Weight Management  -     CBC and differential; Future  -     Insulin, fasting; Future  -     Lipid panel; Future  -     Magnesium; Future  -     T3, free; Future  -     TSH, 3rd generation with Free T4 reflex; Future  -     Uric acid; Future  -     Vitamin D 25 hydroxy; Future          -In addition, please follow general recommendations below.          Return visit:  6-8 weeks         General Lifestyle recommendations:    Nutrition   -Avoid skipping meals. Avoid sugary beverages. At least 64oz of water daily.  Limit processed food, refined sugars and grain. Encourage  healthy choices for meals and snacks   -Focus on protein goals and non starchy fiber rich vegetables for satiety effect and to help support a calorie deficit.   - Emphasize portion control, well balanced macronutrient's (protein, carbohydrate, fat using MyPlate method )and adequate protein with each meal/snacks and distributing calories equally throughout the day along with.   -Advise starting the day with a protein breakfast   Behavioral/Stress   Food log via ria or provided paper log (ria options include www.AccuVein.com, sparkpeople.com, loseit.com, calorieking.com, [employer]). Encouraged mindful eating. Be sure to set aside time to eat, eat slowly, and savor your food. Consider meditation apps and/or taking a few minutes of mindfulness every AM. Understand the role of regarding the role of stress hormone cortisol in promoting weight gain and visceral fat accumulation. Weigh daily or atleast 2-3 times/ week  Physical Activity   Increase physical activity by 10 minutes daily. Gradually increase physical activity to a goal of 5 days per week for 30 minutes of MODERATE intensity ( should be able to pass the \"talk test\" but should not be able to sing. Target " 150-300 minutes  PLUS 2 days per week of FULL BODY resistance training. Progression will be addressed at follow up visits. Encouraged contemplation regarding establishing a daily physical activity routine  - Resistance training along with increase protein intake is important to maintain and enhance metabolism  Sleep   Encourage sleep hygiene and importance of having adequate sleep duration at least > 6 hours to support response in weight loss efforts    Handouts provided :  THRIVE program at [facility]  MyPlate and food quality  Food log resources, phone ria or paper journal  Antiobesity medications options     - Discussed at length and the role of weight loss medications and medication options   - Explained the importance of making lifestyle changes in addition to starting any anti-obesity medications if the  patient chooses.  -  Initial weight loss goal of 5-10% weight loss for improved health  - Weight loss can improve patient's co-morbid conditions and/or prevent weight-related complications.  - Weight is not at goal and patient has been unable to achieve a meaningful weight loss above 5% using various programs and tools for more than 6 months    Carson was seen today for consult.    Diagnoses and all orders for this visit:    Obesity, Class II, BMI 35-39.9    Obesity (BMI 30-39.9)  -     Ambulatory referral to Weight Management  -     CBC and differential; Future  -     Insulin, fasting; Future  -     Lipid panel; Future  -     Magnesium; Future  -     T3, free; Future  -     TSH, 3rd generation with Free T4 reflex; Future  -     Uric acid; Future  -     Vitamin D 25 hydroxy; Future                      Total time spent reviewing chart, interviewing patient, examining patient, discussing plan, answering all questions, and documentin min, with >50% face-to-face time spent counseling patient on nonsurgical interventions for the treatment of excess weight. Discussed in detail nonsurgical options including  "intensive lifestyle intervention program, very low-calorie diet program and conservative program.  Discussed the role of weight loss medications.  Counseled patient on diet behavior and exercise modification for weight loss.            Lifestyle questionnaire       Diet recall:  B: Verdugo and cheese on whole-wheat toast  L: Meat loaf mashed potatoes or chicken  D: Sometimes skips sometimes salad  S: Corn chips or potato chips, cookies and cake  Eating out vs cooking at home-eats out every day    Beverages  Water--1 bottle 16 ounce    caffeine/tea-- 20 oz     SSB-regular Pepsi 12-16 oz    Alcohol: 1 beer a week  Smoking: none  Drug use: none    Physical Activity -- havent been doing much; walking to the machine and back and climbing up to the machine and down  Sleep -- STOP- BANG-  has h/o CATHY and doesn't want to wear CPA for years.  Around 6 to 7 hours    Occupation-longshoreman (full-time) operates equipment  Psycho social-lives with wife        Weight history     Start date: 04/10/24  Current weight : 241 lbs  Current BMI: 38.44 kg/m2  Obesity Class: 35.0-39.9- Obesity Class II  Goal weight: 206 lbs    Onset--was 206 a few years ago  Food behaviors\"head\" hunger/cravings  Fam hx of obesity-none  Weight loss medications tried: none    Patient reports and other history-  Referred by PCP  Wt Readings from Last 20 Encounters:   04/10/24 110 kg (241 lb 12.8 oz)   03/11/24 109 kg (241 lb)   01/29/24 108 kg (238 lb 9.6 oz)   08/30/23 106 kg (234 lb 6.4 oz)   12/21/22 104 kg (229 lb 3.2 oz)   01/10/22 103 kg (226 lb)   12/30/21 102 kg (224 lb)   12/23/21 97.5 kg (215 lb)   12/22/21 97.5 kg (215 lb)   12/14/21 102 kg (225 lb 1.4 oz)   12/08/21 107 kg (235 lb)   11/08/21 108 kg (239 lb)   02/04/21 107 kg (235 lb)   07/24/20 100 kg (220 lb 6.4 oz)   03/11/20 96.6 kg (213 lb)   02/10/20 102 kg (225 lb 9.6 oz)   12/24/19 99.3 kg (219 lb)   09/09/19 101 kg (222 lb)   08/14/19 98 kg (216 lb)   07/01/19 96.6 kg (213 lb) "           Medication considerations/contraindications     -Patient denies personal history of pancreatitis. Patient also denies personal and family history of medullary thyroid cancer and multiple endocrine neoplasia type 2 (MEN 2 tumor). -Patient denies any history of +kidney stones, seizures, or glaucoma, diabetic retinopathy, gall bladder disease, hyperthyroidism.  -Denies Hx of CAD, PAD, palpitations, arrhythmia.   -Denies uncontrolled anxiety or depression, suicidal behavior or thinking , insomnia or sleep disturbance.         Past medical history/past surgical history       Previous notes and records have been reviewed.    The following portions of the patient's history were reviewed and updated as appropriate: allergies, current medications, past family history, past medical history, past social history, past surgical history, and problem list.    Past Medical History:   Diagnosis Date    Arthritis     BPH (benign prostatic hypertrophy)     Hx post laser TURP    GERD (gastroesophageal reflux disease)     H/O exercise stress test     Hyperlipidemia     Hypertension     Morbid obesity (HCC)     Last Assessed:12/21/2016 ;     Plantar fascial fibromatosis     Onset:1/23/2012    PONV (postoperative nausea and vomiting)     Sleep apnea     does not wear CPAP         Past Surgical History:   Procedure Laterality Date    COLONOSCOPY      COLONOSCOPY N/A 2/22/2017    Procedure: COLONOSCOPY;  Surgeon: Shahana Colunga MD;  Location: [facility] GI LAB;  Service:     ESOPHAGOGASTRODUODENOSCOPY N/A 2/22/2017    Procedure: ESOPHAGOGASTRODUODENOSCOPY (EGD);  Surgeon: Shahana Colunga MD;  Location: [facility] GI LAB;  Service:     HERNIA REPAIR  2012    umbilical    KNEE ARTHROSCOPY Left     TONSILLECTOMY      TRANSURETHRAL RESECTION OF PROSTATE               Family History   Problem Relation Age of Onset    Diabetes Mother     Arthritis Mother     Hypertension Mother     Liver disease Mother     No Known Problems Daughter     No Known  "Problems Son     No Known Problems Son             Objective     /80 (BP Location: Left arm, Patient Position: Sitting, Cuff Size: Large)   Pulse 74   Ht 5' 6.5\" (1.689 m)   Wt 110 kg (241 lb 12.8 oz)   BMI 38.44 kg/m²       Review of Systems   Constitutional:  Negative for fatigue.   HENT:  Negative for sore throat.    Respiratory:  Negative for cough and shortness of breath.    Cardiovascular:  Negative for chest pain, palpitations and leg swelling.   Gastrointestinal:  Negative for abdominal pain, constipation, diarrhea and nausea.   Genitourinary:  Negative for dysuria.   Musculoskeletal:  Negative for arthralgias and back pain.   Skin:  Negative for rash.   Neurological:  Negative for headaches.   Psychiatric/Behavioral:  Negative for dysphoric mood. The patient is not nervous/anxious.        Physical Exam  Vitals and nursing note reviewed.   Constitutional:       Appearance: Normal appearance.   HENT:      Head: Normocephalic.   Neck:      Thyroid: No thyroid mass, thyromegaly or thyroid tenderness.   Cardiovascular:      Rate and Rhythm: Normal rate and regular rhythm.      Pulses: Normal pulses.      Heart sounds: Normal heart sounds.   Pulmonary:      Effort: Pulmonary effort is normal.      Breath sounds: Normal breath sounds.   Abdominal:      General: Abdomen is flat.      Palpations: Abdomen is soft.   Musculoskeletal:      Cervical back: Normal range of motion and neck supple. No tenderness.   Skin:     General: Skin is warm and dry.   Neurological:      General: No focal deficit present.      Mental Status: He is alert and oriented to person, place, and time.   Psychiatric:         Mood and Affect: Mood normal.         Behavior: Behavior normal.         Thought Content: Thought content normal.         Judgment: Judgment normal.              Screening       Colonoscopy: UTD        Medications       Current Outpatient Medications:     meloxicam (MOBIC) 15 mg tablet, Take 1 tablet (15 mg total) " by mouth daily as needed for moderate pain, Disp: 90 tablet, Rfl: 1           Labs and imaging     Recent labs and imaging have been personally reviewed.  Lab Results   Component Value Date    WBC 4.15 (L) 12/16/2021    HGB 15.7 12/16/2021    HCT 47.1 12/16/2021    MCV 91 12/16/2021     (L) 12/16/2021     Lab Results   Component Value Date     01/13/2018    SODIUM 142 09/01/2023    K 4.5 09/01/2023     09/01/2023    CO2 22 09/01/2023    AGAP 9 12/16/2021    BUN 15 09/01/2023    CREATININE 0.96 09/01/2023    GLUC 95 09/01/2023    CALCIUM 8.2 (L) 12/16/2021    AST 17 09/01/2023    ALT 12 09/01/2023    ALKPHOS 52 12/16/2021    PROT 7.6 01/13/2018    TP 6.5 09/01/2023    BILITOT 0.7 01/13/2018    TBILI 0.7 09/01/2023    EGFR 81 09/01/2023     Lab Results   Component Value Date    HGBA1C 5.5 09/01/2023     Lab Results   Component Value Date    KEC1ETMHVARW 1.40 06/11/2016     Lab Results   Component Value Date    CHOLESTEROL 140 09/01/2023     Lab Results   Component Value Date    HDL 41 09/01/2023     Lab Results   Component Value Date    TRIG 106 09/01/2023     Lab Results   Component Value Date    LDLCALC 79 09/01/2023

## 2024-04-21 NOTE — PROGRESS NOTES
"Weight Management Medical Nutrition Assessment  Carson is here today for menu planning. Current weight 237.5#. Patient has struggled with his weight for many years and expressed many barriers to weight loss. He was successful with [place] diet program that was 600 kcal but was unable to maintain loss without severe restriction. Patient's wife encouraged him to join [facility]WM program and patient voiced that he struggles with personal motivators. Feels he has strong cravings for certain foods and has difficulty overcoming these cravings. Explained  visit and encouraged patient to schedule with SW. Discussed benefits of interval eating, adequate protein intake, and mindful eating. Utilized open ended questions to discuss importance of change and explore ambivalence to change. Encouraged patient to use his knowledge of why soda may not be the best option for him daily and work to increase free water intake. Patient has 2-3 meals away from home daily and does not cook. Will consider adding protein shake from breakfast or preparing simple meals at home, such as tuna salad or egg + cheese on an english muffin. Emphasized the importance of balance and tuning into hunger/fullness cues. Low-calorie meal plan reviewed. Patient would like to return to activity but due to time constraints were unable to make goal for activity. RD card provided and options for follow up reviewed. Patient will f/u x 1 month.     Likes: eggs, nuts, cheese, peanut butter, hummus, tuna     Patient seen by Medical Provider in past 6 months:  yes  Requested to schedule appointment with Medical Provider: No    Anthropometric Measurements  Provider Start Weight (#): 241.8# (4/10/24)  Current Weight (#): 237.5#  TBW % Change from start weight: 1.8%  Ideal Body Weight (#): 145# (56.5\")  Goal Weight (#): 206#  Highest Weight (#): 271#  Lowest Weight (#): 206# (2-3 years ago)     Weight Loss History  Previous weight loss attempts: Counseling with MD  Exercise, High " Protein/Low CHO diets (Atkins, Victor, etc.)  Self Created Diets (Portion Control, Healthy Food Choices, etc.)    Food and Nutrition Related History  Wake up: 5 am (for work) or 7 am (when off)    Bed Time: 11 pm-12 am   Sleep quality: Averages 6-7 hours, CATHY but unable to tolerate CPAP    Dietary Recall  Breakfast (8-10 am): (diner or drive thru) pork chop w/ gravy + non-starchy vegetables Or bagel with butter Or today was [employer] (quinones, egg wrap with hash browns)  Snack: --  Lunch (1-3 pm): (diner or drive thru) meat loaf w/ mashed potatoes   Snack: --  Dinner: -- Or protein/vegetable/carb (diner)  Snack: chips Or cookies Or cake Or cereal    Beverages: water, 20 oz coffee, 12-16 oz pepsi, alcohol (1 beer/week)  Volume of beverage intake: 16-32 oz water    Weekends: Same  Cravings: varies - pizza, fast food  Trouble area of day: not assessed     Frequency of Eating out: daily (diner)   Food restrictions: NKA   Cooking: self but rarely cooks  Food Shopping: self     Occupation: longshoreman (full-time) operates equipment    Physical Activity  Activity: No formal routine  Frequency: rarely  Physical limitations/barriers to exercise: none    Estimated Needs  [facility] Energy Needs (needs at 241.8#)  BMR: 1,767 kcal  Maintenance calories (sedentary): 2,121 kcal  1-2#/week loss (sedentary): 1,121-1,621 kcal [0.5#/week: 1,871 kcal]  1-2#/week loss (light activity): 1,430-1,930 kcal    Protein: 79-99 grams (1.2-1.5g/kg IBW)  Fluid: 77 ounces (35mL/kg IBW)    Nutrition Diagnosis  Yes;    Overweight/obesity related to Excess energy intake as evidenced by BMI more than normative standard for age and sex (obesity-grade II 35-39.9)     Nutrition Intervention    Nutrition Prescription  Calories: 1,400-1,600 kcal  Protein: 79-99 g  Fluid: 77 ounces    Meal Plan (Pascual/Pro)  Breakfast: 350/15-30  Snack: 100-150/5+  Lunch: 400/30  Snack: --  Dinner: 500/35  Snack: 100-150/5+    Nutrition Education  Calorie  controlled menu  Lean protein food choices  Healthy snack options  Food journaling tips    Nutrition Counseling  Strategies: meal planning, portion sizes, healthy snack choices, hydration, fiber intake, protein intake, exercise, food logging    Monitoring and Evaluation:    Evaluation criteria  Energy Intake  Meet protein needs  Maintain adequate hydration  Monitor weekly weight  Meal planning/preparation  Food journal   Decreased portions at mealtimes and snacks  Physical activity     Barriers to change:emotional  Readiness to change: Contemplation:  (Acknowledging that there is a problem but not yet ready or sure of wanting to make a change) and Preparation:  (Getting ready to change)   Comprehension: good  Expected Compliance: good

## 2024-04-22 ENCOUNTER — CLINICAL SUPPORT (OUTPATIENT)
Dept: BARIATRICS | Facility: CLINIC | Age: 79
End: 2024-04-22

## 2024-04-22 VITALS — BODY MASS INDEX: 37.28 KG/M2 | HEIGHT: 67 IN | WEIGHT: 237.5 LBS

## 2024-04-22 DIAGNOSIS — R63.5 ABNORMAL WEIGHT GAIN: Primary | ICD-10-CM

## 2024-04-22 PROCEDURE — WMDI30

## 2024-04-22 PROCEDURE — RECHECK

## 2024-05-02 DIAGNOSIS — M15.9 PRIMARY OSTEOARTHRITIS INVOLVING MULTIPLE JOINTS: ICD-10-CM

## 2024-05-03 RX ORDER — MELOXICAM 15 MG/1
15 TABLET ORAL DAILY PRN
Qty: 90 TABLET | Refills: 1 | Status: SHIPPED | OUTPATIENT
Start: 2024-05-03

## 2024-05-21 ENCOUNTER — TELEPHONE (OUTPATIENT)
Age: 79
End: 2024-05-21

## 2024-05-21 NOTE — TELEPHONE ENCOUNTER
Pt called in to r/s appt with Sasha. Appt was cancelled and pt was told will receive a call back from the practice to r/s.

## 2024-05-22 ENCOUNTER — TELEPHONE (OUTPATIENT)
Dept: BARIATRICS | Facility: CLINIC | Age: 79
End: 2024-05-22

## 2024-06-11 ENCOUNTER — OFFICE VISIT (OUTPATIENT)
Dept: FAMILY MEDICINE CLINIC | Facility: CLINIC | Age: 79
End: 2024-06-11
Payer: COMMERCIAL

## 2024-06-11 VITALS
RESPIRATION RATE: 17 BRPM | TEMPERATURE: 99.2 F | WEIGHT: 242.6 LBS | BODY MASS INDEX: 38.08 KG/M2 | SYSTOLIC BLOOD PRESSURE: 132 MMHG | HEIGHT: 67 IN | HEART RATE: 72 BPM | DIASTOLIC BLOOD PRESSURE: 70 MMHG

## 2024-06-11 DIAGNOSIS — G47.33 OBSTRUCTIVE SLEEP APNEA: ICD-10-CM

## 2024-06-11 DIAGNOSIS — Z13.1 SCREENING FOR DIABETES MELLITUS (DM): ICD-10-CM

## 2024-06-11 DIAGNOSIS — I71.43 INFRARENAL ABDOMINAL AORTIC ANEURYSM (AAA) WITHOUT RUPTURE (HCC): ICD-10-CM

## 2024-06-11 DIAGNOSIS — J01.00 ACUTE NON-RECURRENT MAXILLARY SINUSITIS: Primary | ICD-10-CM

## 2024-06-11 DIAGNOSIS — Z13.220 SCREENING FOR LIPID DISORDERS: ICD-10-CM

## 2024-06-11 DIAGNOSIS — E66.9 OBESITY, CLASS II, BMI 35-39.9: ICD-10-CM

## 2024-06-11 PROBLEM — R03.0 ELEVATED BP WITHOUT DIAGNOSIS OF HYPERTENSION: Status: RESOLVED | Noted: 2024-03-11 | Resolved: 2024-06-11

## 2024-06-11 PROCEDURE — 99214 OFFICE O/P EST MOD 30 MIN: CPT | Performed by: FAMILY MEDICINE

## 2024-06-11 RX ORDER — CEFDINIR 300 MG/1
600 CAPSULE ORAL DAILY
Qty: 20 CAPSULE | Refills: 0 | Status: SHIPPED | OUTPATIENT
Start: 2024-06-11 | End: 2024-06-21

## 2024-06-11 NOTE — PROGRESS NOTES
Assessment/Plan:    No problem-specific Assessment & Plan notes found for this encounter.    Sinusitis new  Abx  Otc  F/u if no better    AAA  F/u us ordered  Aware of last result and planned f/u    CATHY  Suggest cpap compliance    Bmi 38  Obesity aware  Suggest continued wt loss to prevent recurrent htn     Diagnoses and all orders for this visit:    Acute non-recurrent maxillary sinusitis  -     cefdinir (OMNICEF) 300 mg capsule; Take 2 capsules (600 mg total) by mouth daily for 10 days (May take 2 pills together once a day)    Infrarenal abdominal aortic aneurysm (AAA) without rupture (HCC)  -     US abdominal aorta; Future    Screening for diabetes mellitus (DM)  -     Comprehensive metabolic panel; Future    Screening for lipid disorders  -     Lipid Panel with Direct LDL reflex; Future    Obstructive sleep apnea    Obesity, Class II, BMI 35-39.9        Return in about 5 months (around 11/4/2024) for Annual physical.    Subjective:      Patient ID: Carson Whitt is a 78 y.o. male.    Chief Complaint   Patient presents with    Cough     Started about 2 weeks ago Kaylie Sotelo LPN      Earache    Nasal Congestion    chest congestion    Fatigue       HPI  2 weeks  As above  Yellow phlegm  Chest congestion and wheezing  Feels off balance not vertigo  No fever  Lots of cough  Ears itchy  Wife is not sick  No otc tried    Seeing rheumatologist in [place] area    The following portions of the patient's history were reviewed and updated as appropriate: allergies, current medications, past family history, past medical history, past social history, past surgical history and problem list.    Review of Systems   Respiratory:  Negative for shortness of breath.    Cardiovascular:  Negative for chest pain.         Current Outpatient Medications   Medication Sig Dispense Refill    cefdinir (OMNICEF) 300 mg capsule Take 2 capsules (600 mg total) by mouth daily for 10 days (May take 2 pills together once a day) 20 capsule 0  "   meloxicam (MOBIC) 15 mg tablet TAKE 1 TABLET BY MOUTH DAILY AS NEEDED FOR MODERATE PAIN. 90 tablet 1     No current facility-administered medications for this visit.       Objective:    /70   Pulse 72   Temp 99.2 °F (37.3 °C)   Resp 17   Ht 5' 6.5\" (1.689 m)   Wt 110 kg (242 lb 9.6 oz)   BMI 38.57 kg/m²        Physical Exam  Vitals and nursing note reviewed.   Constitutional:       Appearance: He is well-developed. He is obese. He is not ill-appearing.   HENT:      Head: Normocephalic.      Right Ear: Tympanic membrane normal.      Left Ear: Tympanic membrane normal.      Nose: Congestion present.   Eyes:      General: No scleral icterus.     Conjunctiva/sclera: Conjunctivae normal.   Cardiovascular:      Rate and Rhythm: Normal rate and regular rhythm.   Pulmonary:      Effort: Pulmonary effort is normal. No respiratory distress.      Breath sounds: No wheezing or rales.   Abdominal:      Palpations: Abdomen is soft.   Musculoskeletal:         General: No deformity.      Cervical back: Neck supple.   Skin:     General: Skin is warm and dry.      Coloration: Skin is not pale.   Neurological:      Mental Status: He is alert.      Motor: No weakness.      Gait: Gait normal.   Psychiatric:         Mood and Affect: Mood normal.         Behavior: Behavior normal.         Thought Content: Thought content normal.                Lior Rodriguez DO    "

## 2024-06-13 ENCOUNTER — TELEPHONE (OUTPATIENT)
Age: 79
End: 2024-06-13

## 2024-06-13 NOTE — TELEPHONE ENCOUNTER
Patient called because he would like copies of his recent blood work results mailed to his address in file.

## 2024-11-05 NOTE — PROGRESS NOTES
Daily Note     Today's date: 3/21/2022  Patient name: Corry Masters  : 1945  MRN: [id number]  Referring provider: Flakita Cooper, DO  Dx:   Encounter Diagnoses   Name Primary?  COVID-19 Yes    Other fatigue                   PLAN OF CARE START:22  PLAN OF CARE END: 22  FREQUENCY: 2x/wk  Visit: 15 of 20       ASSESSENT:  Educated on results of RBANs and pt demonstrating platue of therapy- educated on results and  Recommendation for discharge at this time  Pt reports he feels his memory isn't the same and discussed strategies and maintaining cognitive lifestyle  Subjective: "I can't see it " pt was 5 minutes late for therapy session    Objective: See treatment below  Performed word wheel focusing on sustained attention, EF skills-- required moderate VCs for problem solving     Performed tomi's schedule and required moderate VCs for problem solving task for EF skills  provided for HEP      Assessment: Tolerated treatment well  Pt session focused on sustained attention and executive functioning  Pt demonstrating decreased mental manipulation  Recommend to d/c from OT services with pt in understanding and agreement  Recommending to f/u with OT services in 3-4 months if pt feels there is a cognitive decline  Plan: Continued skilled OT per POC  0 (no pain/absence of nonverbal indicators of pain)

## 2024-11-06 ENCOUNTER — OFFICE VISIT (OUTPATIENT)
Dept: FAMILY MEDICINE CLINIC | Facility: CLINIC | Age: 79
End: 2024-11-06
Payer: COMMERCIAL

## 2024-11-06 VITALS
BODY MASS INDEX: 39.39 KG/M2 | WEIGHT: 251 LBS | OXYGEN SATURATION: 98 % | HEIGHT: 67 IN | HEART RATE: 95 BPM | TEMPERATURE: 98.5 F | RESPIRATION RATE: 16 BRPM | SYSTOLIC BLOOD PRESSURE: 136 MMHG | DIASTOLIC BLOOD PRESSURE: 80 MMHG

## 2024-11-06 DIAGNOSIS — Z13.220 SCREENING FOR LIPID DISORDERS: ICD-10-CM

## 2024-11-06 DIAGNOSIS — Z00.00 HEALTHCARE MAINTENANCE: Primary | ICD-10-CM

## 2024-11-06 DIAGNOSIS — I71.43 INFRARENAL ABDOMINAL AORTIC ANEURYSM (AAA) WITHOUT RUPTURE (HCC): ICD-10-CM

## 2024-11-06 DIAGNOSIS — M25.511 CHRONIC PAIN OF BOTH SHOULDERS: ICD-10-CM

## 2024-11-06 DIAGNOSIS — G89.29 CHRONIC PAIN OF BOTH SHOULDERS: ICD-10-CM

## 2024-11-06 DIAGNOSIS — M20.60 DEFORMITY OF TOE, UNSPECIFIED LATERALITY: ICD-10-CM

## 2024-11-06 DIAGNOSIS — Z13.1 SCREENING FOR DIABETES MELLITUS (DM): ICD-10-CM

## 2024-11-06 DIAGNOSIS — M25.512 CHRONIC PAIN OF BOTH SHOULDERS: ICD-10-CM

## 2024-11-06 PROBLEM — M23.203 OLD PERIPHERAL TEAR OF MEDIAL MENISCUS OF RIGHT KNEE: Status: RESOLVED | Noted: 2017-04-28 | Resolved: 2024-11-06

## 2024-11-06 PROBLEM — M17.11 PRIMARY LOCALIZED OSTEOARTHRITIS OF RIGHT KNEE: Status: RESOLVED | Noted: 2017-04-28 | Resolved: 2024-11-06

## 2024-11-06 PROCEDURE — 99397 PER PM REEVAL EST PAT 65+ YR: CPT | Performed by: FAMILY MEDICINE

## 2024-11-06 NOTE — PROGRESS NOTES
"Assessment/Plan:    No problem-specific Assessment & Plan notes found for this encounter.    Cpe  Wt loss advised  Orthopedics for chronic shoulder pain/RTC  B/l great toe discoloration, offer podiatry eddy Thak[facility] due 12/2024, pending     Diagnoses and all orders for this visit:    Healthcare maintenance    Screening for diabetes mellitus (DM)  -     Comprehensive metabolic panel; Future  -     Hemoglobin A1C; Future  -     Comprehensive metabolic panel  -     Hemoglobin A1C    Screening for lipid disorders  -     Lipid Panel with Direct LDL reflex; Future  -     Lipid Panel with Direct LDL reflex    Chronic pain of both shoulders  -     Ambulatory referral to Orthopedic Surgery; Future    Deformity of toe, unspecified laterality  -     Ambulatory referral to Podiatry; Future        Return if symptoms worsen or fail to improve.    Subjective:      Patient ID: Carson Whitt is a 78 y.o. male.    Chief Complaint   Patient presents with    Physical Exam     Cma         HPI  Has gained 20# in past year  Not on diet  No exercise  1 beer/d  Some water    Urine/stool ok  No blood seen    Other issues  Left great toe, dark area  No injury    Urologist Dr [place]    Still working    The following portions of the patient's history were reviewed and updated as appropriate: allergies, current medications, past family history, past medical history, past social history, past surgical history and problem list.    Review of Systems   Constitutional:  Positive for unexpected weight change. Negative for chills and fever.         Current Outpatient Medications   Medication Sig Dispense Refill    meloxicam (MOBIC) 15 mg tablet TAKE 1 TABLET BY MOUTH DAILY AS NEEDED FOR MODERATE PAIN. 90 tablet 1     No current facility-administered medications for this visit.       Objective:    /80   Pulse 95   Temp 98.5 °F (36.9 °C) (Tympanic)   Resp 16   Ht 5' 6.5\" (1.689 m)   Wt 114 kg (251 lb)   SpO2 98%   BMI " 39.91 kg/m²        Physical Exam  Vitals and nursing note reviewed.   Constitutional:       General: He is not in acute distress.     Appearance: He is well-developed. He is obese. He is not ill-appearing.   HENT:      Head: Normocephalic.      Right Ear: Tympanic membrane normal.      Left Ear: Tympanic membrane normal.   Eyes:      General: No scleral icterus.     Conjunctiva/sclera: Conjunctivae normal.   Neck:      Vascular: No carotid bruit.   Cardiovascular:      Rate and Rhythm: Normal rate and regular rhythm.      Heart sounds: No murmur heard.  Pulmonary:      Effort: Pulmonary effort is normal. No respiratory distress.      Breath sounds: No wheezing.   Abdominal:      Palpations: Abdomen is soft.      Tenderness: There is no abdominal tenderness.   Musculoskeletal:         General: No deformity.      Cervical back: Neck supple.      Right lower leg: No edema.      Left lower leg: No edema.   Skin:     General: Skin is warm and dry.      Coloration: Skin is not pale.      Comments: Great toe dark discoloration of nail   Neurological:      Mental Status: He is alert.      Motor: No weakness.      Gait: Gait normal.   Psychiatric:         Mood and Affect: Mood normal.         Behavior: Behavior normal.         Thought Content: Thought content normal.                Lior Rodriguez DO

## 2024-11-07 LAB
ALBUMIN SERPL-MCNC: 4 G/DL (ref 3.8–4.8)
ALP SERPL-CCNC: 72 IU/L (ref 44–121)
ALT SERPL-CCNC: 13 IU/L (ref 0–44)
AST SERPL-CCNC: 15 IU/L (ref 0–40)
BILIRUB SERPL-MCNC: 0.8 MG/DL (ref 0–1.2)
BUN SERPL-MCNC: 16 MG/DL (ref 8–27)
BUN/CREAT SERPL: 16 (ref 10–24)
CALCIUM SERPL-MCNC: 9.1 MG/DL (ref 8.6–10.2)
CHLORIDE SERPL-SCNC: 100 MMOL/L (ref 96–106)
CHOLEST SERPL-MCNC: 163 MG/DL (ref 100–199)
CO2 SERPL-SCNC: 23 MMOL/L (ref 20–29)
CREAT SERPL-MCNC: 0.98 MG/DL (ref 0.76–1.27)
EGFR: 79 ML/MIN/1.73
EST. AVERAGE GLUCOSE BLD GHB EST-MCNC: 123 MG/DL
GLOBULIN SER-MCNC: 2.5 G/DL (ref 1.5–4.5)
GLUCOSE SERPL-MCNC: 112 MG/DL (ref 70–99)
HBA1C MFR BLD: 5.9 % (ref 4.8–5.6)
HDLC SERPL-MCNC: 39 MG/DL
LDLC SERPL CALC-MCNC: 96 MG/DL (ref 0–99)
LDLC/HDLC SERPL: 2.5 RATIO (ref 0–3.6)
MICRODELETION SYND BLD/T FISH: NORMAL
POTASSIUM SERPL-SCNC: 4.8 MMOL/L (ref 3.5–5.2)
PROT SERPL-MCNC: 6.5 G/DL (ref 6–8.5)
SL AMB VLDL CHOLESTEROL CALC: 28 MG/DL (ref 5–40)
SODIUM SERPL-SCNC: 138 MMOL/L (ref 134–144)
TRIGL SERPL-MCNC: 160 MG/DL (ref 0–149)

## 2024-11-09 DIAGNOSIS — M15.0 PRIMARY OSTEOARTHRITIS INVOLVING MULTIPLE JOINTS: ICD-10-CM

## 2024-11-11 RX ORDER — MELOXICAM 15 MG/1
15 TABLET ORAL DAILY PRN
Qty: 90 TABLET | Refills: 1 | Status: SHIPPED | OUTPATIENT
Start: 2024-11-11

## 2025-06-14 DIAGNOSIS — M15.0 PRIMARY OSTEOARTHRITIS INVOLVING MULTIPLE JOINTS: ICD-10-CM

## 2025-06-15 RX ORDER — MELOXICAM 15 MG/1
15 TABLET ORAL DAILY PRN
Qty: 90 TABLET | Refills: 1 | Status: SHIPPED | OUTPATIENT
Start: 2025-06-15

## (undated) DEVICE — "MH-443 SUCTION VALVE F/EVIS140 EVIS160": Brand: SUCTION VALVE

## (undated) DEVICE — 60 ML SYRINGE,REGULAR TIP: Brand: MONOJECT

## (undated) DEVICE — SOLIDIFIER FLUID WASTE CONTROL 1500ML

## (undated) DEVICE — BITE BLOCK ENDO 60FR ADLT MAXI  DISP W/STRAP

## (undated) DEVICE — SNARE BARBED 230CM

## (undated) DEVICE — FORCEP ELECSURG RADIAL JAW4 2.2 X 240CM  HOT BX

## (undated) DEVICE — LUBRICANT SURGILUBE TUBE 4 OZ  FLIP TOP

## (undated) DEVICE — "MAJ-901 WATER CONTAINER SET CV-160/140": Brand: WATER CONTAINER

## (undated) DEVICE — TUBING AUX CHANNEL

## (undated) DEVICE — "MB-142 MOUTHPIECE": Brand: MOUTHPIECE

## (undated) DEVICE — 1200CC GUARDIAN II: Brand: GUARDIAN

## (undated) DEVICE — MEDI-VAC YANKAUER SUCTION HANDLE: Brand: CARDINAL HEALTH

## (undated) DEVICE — "MH-438 A/W VLVE F/140 EVIS-140": Brand: AIR/WATER VALVE

## (undated) DEVICE — SINGLE-USE BIOPSY FORCEPS: Brand: RADIAL JAW 4

## (undated) DEVICE — MARKER SPOT EX  BOWEL TATTOO SYRINGE

## (undated) DEVICE — TRAP POLY

## (undated) DEVICE — BRUSH ENDO CLEANING DBL-HEADER

## (undated) DEVICE — TUBING BUBBLE CLEAR 5MM X 100 FT NS

## (undated) DEVICE — AIRLIFE™  ADULT CUSHION NASAL CANNULA WITH 7 FOOT (2.1 M) CRUSH-RESISTANT OXYGEN TUBING, AND U/CONNECT-IT ADAPTER: Brand: AIRLIFE™

## (undated) DEVICE — GLOVE EXAM NON-STRL NTRL PLUS LRG PF

## (undated) DEVICE — DISPOSABLE BIOPSY VALVE MAJ-1555: Brand: SINGLE USE BIOPSY VALVE (STERILE)

## (undated) DEVICE — GROUNDING PAD UNIVERSAL SLW

## (undated) DEVICE — BAG SPECIMEN BIOHAZARD 10 X 10 ADHESIVE

## (undated) DEVICE — BRUSH CYTOLOGY 3 MM 240 CM

## (undated) DEVICE — Device: Brand: OLYMPUS

## (undated) DEVICE — TRAVELKIT CONTAINS FIRST STEP KIT (200ML EP-4 KIT) AND SOILED SCOPE BAG - 1 KIT: Brand: TRAVELKIT CONTAINS FIRST STEP KIT AND SOILED SCOPE BAG

## (undated) DEVICE — GAUZE SPONGES,16 PLY: Brand: CURITY